# Patient Record
Sex: FEMALE | Race: WHITE | NOT HISPANIC OR LATINO | URBAN - METROPOLITAN AREA
[De-identification: names, ages, dates, MRNs, and addresses within clinical notes are randomized per-mention and may not be internally consistent; named-entity substitution may affect disease eponyms.]

---

## 2021-01-01 ENCOUNTER — INPATIENT (INPATIENT)
Facility: HOSPITAL | Age: 86
LOS: 8 days | Discharge: NURSING, REHAB OR LONG-TERM CARE | End: 2021-04-28
Attending: INTERNAL MEDICINE | Admitting: INTERNAL MEDICINE
Payer: MEDICARE

## 2021-01-01 ENCOUNTER — INPATIENT (INPATIENT)
Facility: HOSPITAL | Age: 86
LOS: 1 days | End: 2021-08-29
Attending: INTERNAL MEDICINE | Admitting: INTERNAL MEDICINE

## 2021-01-01 ENCOUNTER — OUTPATIENT (OUTPATIENT)
Dept: OUTPATIENT SERVICES | Facility: HOSPITAL | Age: 86
LOS: 1 days | End: 2021-01-01
Payer: MEDICARE

## 2021-01-01 ENCOUNTER — INPATIENT (INPATIENT)
Facility: HOSPITAL | Age: 86
LOS: 19 days | Discharge: HOSPICE | End: 2021-08-27
Attending: STUDENT IN AN ORGANIZED HEALTH CARE EDUCATION/TRAINING PROGRAM | Admitting: STUDENT IN AN ORGANIZED HEALTH CARE EDUCATION/TRAINING PROGRAM
Payer: MEDICARE

## 2021-01-01 VITALS
WEIGHT: 128.53 LBS | HEART RATE: 60 BPM | RESPIRATION RATE: 17 BRPM | TEMPERATURE: 99 F | OXYGEN SATURATION: 92 % | DIASTOLIC BLOOD PRESSURE: 69 MMHG | SYSTOLIC BLOOD PRESSURE: 136 MMHG

## 2021-01-01 VITALS
OXYGEN SATURATION: 88 % | HEART RATE: 105 BPM | TEMPERATURE: 98 F | WEIGHT: 160.06 LBS | DIASTOLIC BLOOD PRESSURE: 89 MMHG | HEIGHT: 62 IN | SYSTOLIC BLOOD PRESSURE: 198 MMHG | RESPIRATION RATE: 26 BRPM

## 2021-01-01 VITALS
HEART RATE: 100 BPM | DIASTOLIC BLOOD PRESSURE: 63 MMHG | RESPIRATION RATE: 20 BRPM | SYSTOLIC BLOOD PRESSURE: 123 MMHG | OXYGEN SATURATION: 89 % | TEMPERATURE: 98 F

## 2021-01-01 VITALS
TEMPERATURE: 101 F | OXYGEN SATURATION: 97 % | WEIGHT: 160.06 LBS | HEIGHT: 63 IN | RESPIRATION RATE: 18 BRPM | SYSTOLIC BLOOD PRESSURE: 163 MMHG | HEART RATE: 70 BPM | DIASTOLIC BLOOD PRESSURE: 70 MMHG

## 2021-01-01 VITALS
DIASTOLIC BLOOD PRESSURE: 87 MMHG | SYSTOLIC BLOOD PRESSURE: 141 MMHG | TEMPERATURE: 98 F | HEART RATE: 70 BPM | RESPIRATION RATE: 18 BRPM | OXYGEN SATURATION: 95 %

## 2021-01-01 VITALS
TEMPERATURE: 99 F | RESPIRATION RATE: 18 BRPM | HEART RATE: 95 BPM | SYSTOLIC BLOOD PRESSURE: 143 MMHG | OXYGEN SATURATION: 77 % | DIASTOLIC BLOOD PRESSURE: 78 MMHG

## 2021-01-01 DIAGNOSIS — U07.1 COVID-19: ICD-10-CM

## 2021-01-01 DIAGNOSIS — J96.01 ACUTE RESPIRATORY FAILURE WITH HYPOXIA: ICD-10-CM

## 2021-01-01 DIAGNOSIS — R13.10 DYSPHAGIA, UNSPECIFIED: ICD-10-CM

## 2021-01-01 DIAGNOSIS — Z95.1 PRESENCE OF AORTOCORONARY BYPASS GRAFT: ICD-10-CM

## 2021-01-01 DIAGNOSIS — I25.10 ATHEROSCLEROTIC HEART DISEASE OF NATIVE CORONARY ARTERY WITHOUT ANGINA PECTORIS: ICD-10-CM

## 2021-01-01 DIAGNOSIS — G20 PARKINSON'S DISEASE: ICD-10-CM

## 2021-01-01 DIAGNOSIS — F03.90 UNSPECIFIED DEMENTIA WITHOUT BEHAVIORAL DISTURBANCE: ICD-10-CM

## 2021-01-01 DIAGNOSIS — A41.9 SEPSIS, UNSPECIFIED ORGANISM: ICD-10-CM

## 2021-01-01 DIAGNOSIS — J69.0 PNEUMONITIS DUE TO INHALATION OF FOOD AND VOMIT: ICD-10-CM

## 2021-01-01 DIAGNOSIS — N17.9 ACUTE KIDNEY FAILURE, UNSPECIFIED: ICD-10-CM

## 2021-01-01 DIAGNOSIS — R53.83 OTHER FATIGUE: ICD-10-CM

## 2021-01-01 DIAGNOSIS — E78.5 HYPERLIPIDEMIA, UNSPECIFIED: ICD-10-CM

## 2021-01-01 DIAGNOSIS — Z79.82 LONG TERM (CURRENT) USE OF ASPIRIN: ICD-10-CM

## 2021-01-01 DIAGNOSIS — N10 ACUTE PYELONEPHRITIS: ICD-10-CM

## 2021-01-01 DIAGNOSIS — R50.9 FEVER, UNSPECIFIED: ICD-10-CM

## 2021-01-01 DIAGNOSIS — F02.80 DEMENTIA IN OTHER DISEASES CLASSIFIED ELSEWHERE, UNSPECIFIED SEVERITY, WITHOUT BEHAVIORAL DISTURBANCE, PSYCHOTIC DISTURBANCE, MOOD DISTURBANCE, AND ANXIETY: ICD-10-CM

## 2021-01-01 DIAGNOSIS — F05 DELIRIUM DUE TO KNOWN PHYSIOLOGICAL CONDITION: ICD-10-CM

## 2021-01-01 DIAGNOSIS — J12.82 PNEUMONIA DUE TO CORONAVIRUS DISEASE 2019: ICD-10-CM

## 2021-01-01 DIAGNOSIS — D64.9 ANEMIA, UNSPECIFIED: ICD-10-CM

## 2021-01-01 DIAGNOSIS — G93.49 OTHER ENCEPHALOPATHY: ICD-10-CM

## 2021-01-01 DIAGNOSIS — G93.41 METABOLIC ENCEPHALOPATHY: ICD-10-CM

## 2021-01-01 DIAGNOSIS — R41.0 DISORIENTATION, UNSPECIFIED: ICD-10-CM

## 2021-01-01 DIAGNOSIS — Z51.5 ENCOUNTER FOR PALLIATIVE CARE: ICD-10-CM

## 2021-01-01 DIAGNOSIS — J15.6 PNEUMONIA DUE TO OTHER GRAM-NEGATIVE BACTERIA: ICD-10-CM

## 2021-01-01 DIAGNOSIS — N39.0 URINARY TRACT INFECTION, SITE NOT SPECIFIED: ICD-10-CM

## 2021-01-01 DIAGNOSIS — I10 ESSENTIAL (PRIMARY) HYPERTENSION: ICD-10-CM

## 2021-01-01 DIAGNOSIS — G30.9 ALZHEIMER'S DISEASE, UNSPECIFIED: ICD-10-CM

## 2021-01-01 DIAGNOSIS — E87.6 HYPOKALEMIA: ICD-10-CM

## 2021-01-01 DIAGNOSIS — N18.4 CHRONIC KIDNEY DISEASE, STAGE 4 (SEVERE): ICD-10-CM

## 2021-01-01 DIAGNOSIS — Z95.0 PRESENCE OF CARDIAC PACEMAKER: Chronic | ICD-10-CM

## 2021-01-01 DIAGNOSIS — R06.00 DYSPNEA, UNSPECIFIED: ICD-10-CM

## 2021-01-01 DIAGNOSIS — Z95.0 PRESENCE OF CARDIAC PACEMAKER: ICD-10-CM

## 2021-01-01 DIAGNOSIS — Z88.2 ALLERGY STATUS TO SULFONAMIDES: ICD-10-CM

## 2021-01-01 DIAGNOSIS — E87.0 HYPEROSMOLALITY AND HYPERNATREMIA: ICD-10-CM

## 2021-01-01 DIAGNOSIS — Z66 DO NOT RESUSCITATE: ICD-10-CM

## 2021-01-01 DIAGNOSIS — E88.09 OTHER DISORDERS OF PLASMA-PROTEIN METABOLISM, NOT ELSEWHERE CLASSIFIED: ICD-10-CM

## 2021-01-01 DIAGNOSIS — I12.9 HYPERTENSIVE CHRONIC KIDNEY DISEASE WITH STAGE 1 THROUGH STAGE 4 CHRONIC KIDNEY DISEASE, OR UNSPECIFIED CHRONIC KIDNEY DISEASE: ICD-10-CM

## 2021-01-01 DIAGNOSIS — E87.2 ACIDOSIS: ICD-10-CM

## 2021-01-01 DIAGNOSIS — A41.89 OTHER SPECIFIED SEPSIS: ICD-10-CM

## 2021-01-01 DIAGNOSIS — R53.81 OTHER MALAISE: ICD-10-CM

## 2021-01-01 DIAGNOSIS — R53.2 FUNCTIONAL QUADRIPLEGIA: ICD-10-CM

## 2021-01-01 DIAGNOSIS — R65.20 SEVERE SEPSIS WITHOUT SEPTIC SHOCK: ICD-10-CM

## 2021-01-01 DIAGNOSIS — B96.20 UNSPECIFIED ESCHERICHIA COLI [E. COLI] AS THE CAUSE OF DISEASES CLASSIFIED ELSEWHERE: ICD-10-CM

## 2021-01-01 LAB
-  AMIKACIN: SIGNIFICANT CHANGE UP
-  AMIKACIN: SIGNIFICANT CHANGE UP
-  AMOXICILLIN/CLAVULANIC ACID: SIGNIFICANT CHANGE UP
-  AMOXICILLIN/CLAVULANIC ACID: SIGNIFICANT CHANGE UP
-  AMPICILLIN/SULBACTAM: SIGNIFICANT CHANGE UP
-  AMPICILLIN/SULBACTAM: SIGNIFICANT CHANGE UP
-  AMPICILLIN: SIGNIFICANT CHANGE UP
-  AMPICILLIN: SIGNIFICANT CHANGE UP
-  AZTREONAM: SIGNIFICANT CHANGE UP
-  AZTREONAM: SIGNIFICANT CHANGE UP
-  CEFAZOLIN: SIGNIFICANT CHANGE UP
-  CEFAZOLIN: SIGNIFICANT CHANGE UP
-  CEFEPIME: SIGNIFICANT CHANGE UP
-  CEFEPIME: SIGNIFICANT CHANGE UP
-  CEFOXITIN: SIGNIFICANT CHANGE UP
-  CEFOXITIN: SIGNIFICANT CHANGE UP
-  CEFTRIAXONE: SIGNIFICANT CHANGE UP
-  CEFTRIAXONE: SIGNIFICANT CHANGE UP
-  CIPROFLOXACIN: SIGNIFICANT CHANGE UP
-  CIPROFLOXACIN: SIGNIFICANT CHANGE UP
-  ERTAPENEM: SIGNIFICANT CHANGE UP
-  ERTAPENEM: SIGNIFICANT CHANGE UP
-  GENTAMICIN: SIGNIFICANT CHANGE UP
-  GENTAMICIN: SIGNIFICANT CHANGE UP
-  IMIPENEM: SIGNIFICANT CHANGE UP
-  IMIPENEM: SIGNIFICANT CHANGE UP
-  LEVOFLOXACIN: SIGNIFICANT CHANGE UP
-  LEVOFLOXACIN: SIGNIFICANT CHANGE UP
-  MEROPENEM: SIGNIFICANT CHANGE UP
-  MEROPENEM: SIGNIFICANT CHANGE UP
-  NITROFURANTOIN: SIGNIFICANT CHANGE UP
-  PIPERACILLIN/TAZOBACTAM: SIGNIFICANT CHANGE UP
-  PIPERACILLIN/TAZOBACTAM: SIGNIFICANT CHANGE UP
-  STAPHYLOCOCCUS EPIDERMIDIS, METHICILLIN RESISTANT: SIGNIFICANT CHANGE UP
-  TIGECYCLINE: SIGNIFICANT CHANGE UP
-  TIGECYCLINE: SIGNIFICANT CHANGE UP
-  TOBRAMYCIN: SIGNIFICANT CHANGE UP
-  TOBRAMYCIN: SIGNIFICANT CHANGE UP
-  TRIMETHOPRIM/SULFAMETHOXAZOLE: SIGNIFICANT CHANGE UP
-  TRIMETHOPRIM/SULFAMETHOXAZOLE: SIGNIFICANT CHANGE UP
ALBUMIN SERPL ELPH-MCNC: 1.4 G/DL — LOW (ref 3.3–5)
ALBUMIN SERPL ELPH-MCNC: 1.5 G/DL — LOW (ref 3.3–5)
ALBUMIN SERPL ELPH-MCNC: 1.5 G/DL — LOW (ref 3.3–5)
ALBUMIN SERPL ELPH-MCNC: 1.7 G/DL — LOW (ref 3.3–5)
ALBUMIN SERPL ELPH-MCNC: 1.7 G/DL — LOW (ref 3.3–5)
ALBUMIN SERPL ELPH-MCNC: 1.8 G/DL — LOW (ref 3.3–5)
ALBUMIN SERPL ELPH-MCNC: 2.2 G/DL — LOW (ref 3.3–5)
ALBUMIN SERPL ELPH-MCNC: 2.5 G/DL — LOW (ref 3.3–5)
ALBUMIN SERPL ELPH-MCNC: 2.5 G/DL — LOW (ref 3.3–5)
ALBUMIN SERPL ELPH-MCNC: 2.9 G/DL — LOW (ref 3.3–5)
ALBUMIN SERPL ELPH-MCNC: 3.1 G/DL — LOW (ref 3.3–5)
ALBUMIN SERPL ELPH-MCNC: 3.7 G/DL — SIGNIFICANT CHANGE UP (ref 3.3–5)
ALP SERPL-CCNC: 115 U/L — SIGNIFICANT CHANGE UP (ref 40–120)
ALP SERPL-CCNC: 117 U/L — SIGNIFICANT CHANGE UP (ref 40–120)
ALP SERPL-CCNC: 118 U/L — SIGNIFICANT CHANGE UP (ref 40–120)
ALP SERPL-CCNC: 123 U/L — HIGH (ref 40–120)
ALP SERPL-CCNC: 123 U/L — HIGH (ref 40–120)
ALP SERPL-CCNC: 128 U/L — HIGH (ref 40–120)
ALP SERPL-CCNC: 136 U/L — HIGH (ref 40–120)
ALP SERPL-CCNC: 151 U/L — HIGH (ref 40–120)
ALP SERPL-CCNC: 67 U/L — SIGNIFICANT CHANGE UP (ref 40–120)
ALP SERPL-CCNC: 76 U/L — SIGNIFICANT CHANGE UP (ref 40–120)
ALP SERPL-CCNC: 77 U/L — SIGNIFICANT CHANGE UP (ref 40–120)
ALP SERPL-CCNC: 85 U/L — SIGNIFICANT CHANGE UP (ref 40–120)
ALP SERPL-CCNC: 88 U/L — SIGNIFICANT CHANGE UP (ref 40–120)
ALP SERPL-CCNC: 99 U/L — SIGNIFICANT CHANGE UP (ref 40–120)
ALT FLD-CCNC: 27 U/L — SIGNIFICANT CHANGE UP (ref 12–78)
ALT FLD-CCNC: 29 U/L — SIGNIFICANT CHANGE UP (ref 12–78)
ALT FLD-CCNC: 29 U/L — SIGNIFICANT CHANGE UP (ref 12–78)
ALT FLD-CCNC: 38 U/L — SIGNIFICANT CHANGE UP (ref 12–78)
ALT FLD-CCNC: 39 U/L — SIGNIFICANT CHANGE UP (ref 12–78)
ALT FLD-CCNC: 42 U/L — SIGNIFICANT CHANGE UP (ref 12–78)
ALT FLD-CCNC: 43 U/L — SIGNIFICANT CHANGE UP (ref 12–78)
ALT FLD-CCNC: 43 U/L — SIGNIFICANT CHANGE UP (ref 12–78)
ALT FLD-CCNC: 49 U/L — SIGNIFICANT CHANGE UP (ref 12–78)
ALT FLD-CCNC: 51 U/L — SIGNIFICANT CHANGE UP (ref 12–78)
ALT FLD-CCNC: 54 U/L — SIGNIFICANT CHANGE UP (ref 12–78)
ALT FLD-CCNC: 57 U/L — SIGNIFICANT CHANGE UP (ref 12–78)
ALT FLD-CCNC: 59 U/L — SIGNIFICANT CHANGE UP (ref 12–78)
ALT FLD-CCNC: 71 U/L — SIGNIFICANT CHANGE UP (ref 12–78)
ANION GAP SERPL CALC-SCNC: 10 MMOL/L — SIGNIFICANT CHANGE UP (ref 5–17)
ANION GAP SERPL CALC-SCNC: 11 MMOL/L — SIGNIFICANT CHANGE UP (ref 5–17)
ANION GAP SERPL CALC-SCNC: 13 MMOL/L — SIGNIFICANT CHANGE UP (ref 5–17)
ANION GAP SERPL CALC-SCNC: 3 MMOL/L — LOW (ref 5–17)
ANION GAP SERPL CALC-SCNC: 3 MMOL/L — LOW (ref 5–17)
ANION GAP SERPL CALC-SCNC: 4 MMOL/L — LOW (ref 5–17)
ANION GAP SERPL CALC-SCNC: 5 MMOL/L — SIGNIFICANT CHANGE UP (ref 5–17)
ANION GAP SERPL CALC-SCNC: 6 MMOL/L — SIGNIFICANT CHANGE UP (ref 5–17)
ANION GAP SERPL CALC-SCNC: 7 MMOL/L — SIGNIFICANT CHANGE UP (ref 5–17)
ANION GAP SERPL CALC-SCNC: 8 MMOL/L — SIGNIFICANT CHANGE UP (ref 5–17)
ANION GAP SERPL CALC-SCNC: 9 MMOL/L — SIGNIFICANT CHANGE UP (ref 5–17)
APPEARANCE UR: ABNORMAL
APPEARANCE UR: ABNORMAL
APPEARANCE UR: CLEAR — SIGNIFICANT CHANGE UP
APTT BLD: 26.3 SEC — LOW (ref 27.5–35.5)
APTT BLD: 28.4 SEC — SIGNIFICANT CHANGE UP (ref 27.5–35.5)
APTT BLD: 50.8 SEC — HIGH (ref 27.5–35.5)
AST SERPL-CCNC: 12 U/L — LOW (ref 15–37)
AST SERPL-CCNC: 20 U/L — SIGNIFICANT CHANGE UP (ref 15–37)
AST SERPL-CCNC: 33 U/L — SIGNIFICANT CHANGE UP (ref 15–37)
AST SERPL-CCNC: 34 U/L — SIGNIFICANT CHANGE UP (ref 15–37)
AST SERPL-CCNC: 37 U/L — SIGNIFICANT CHANGE UP (ref 15–37)
AST SERPL-CCNC: 38 U/L — HIGH (ref 15–37)
AST SERPL-CCNC: 39 U/L — HIGH (ref 15–37)
AST SERPL-CCNC: 43 U/L — HIGH (ref 15–37)
AST SERPL-CCNC: 44 U/L — HIGH (ref 15–37)
AST SERPL-CCNC: 48 U/L — HIGH (ref 15–37)
AST SERPL-CCNC: 51 U/L — HIGH (ref 15–37)
AST SERPL-CCNC: 54 U/L — HIGH (ref 15–37)
AST SERPL-CCNC: 69 U/L — HIGH (ref 15–37)
AST SERPL-CCNC: 72 U/L — HIGH (ref 15–37)
BACTERIA # UR AUTO: ABNORMAL
BACTERIA # UR AUTO: ABNORMAL
BASE EXCESS BLDA CALC-SCNC: -0.6 MMOL/L — SIGNIFICANT CHANGE UP (ref -2–2)
BASE EXCESS BLDA CALC-SCNC: -2.1 MMOL/L — LOW (ref -2–2)
BASE EXCESS BLDA CALC-SCNC: -2.8 MMOL/L — LOW (ref -2–2)
BASE EXCESS BLDA CALC-SCNC: -4 MMOL/L — LOW (ref -2–2)
BASOPHILS # BLD AUTO: 0.01 K/UL — SIGNIFICANT CHANGE UP (ref 0–0.2)
BASOPHILS # BLD AUTO: 0.03 K/UL — SIGNIFICANT CHANGE UP (ref 0–0.2)
BASOPHILS # BLD AUTO: 0.03 K/UL — SIGNIFICANT CHANGE UP (ref 0–0.2)
BASOPHILS NFR BLD AUTO: 0.1 % — SIGNIFICANT CHANGE UP (ref 0–2)
BASOPHILS NFR BLD AUTO: 0.3 % — SIGNIFICANT CHANGE UP (ref 0–2)
BASOPHILS NFR BLD AUTO: 0.4 % — SIGNIFICANT CHANGE UP (ref 0–2)
BILIRUB DIRECT SERPL-MCNC: 0.09 MG/DL — SIGNIFICANT CHANGE UP (ref 0.05–0.2)
BILIRUB DIRECT SERPL-MCNC: 0.1 MG/DL — SIGNIFICANT CHANGE UP (ref 0.05–0.2)
BILIRUB DIRECT SERPL-MCNC: 0.12 MG/DL — SIGNIFICANT CHANGE UP (ref 0.05–0.2)
BILIRUB DIRECT SERPL-MCNC: 0.14 MG/DL — SIGNIFICANT CHANGE UP (ref 0.05–0.2)
BILIRUB DIRECT SERPL-MCNC: 0.16 MG/DL — SIGNIFICANT CHANGE UP (ref 0.05–0.2)
BILIRUB DIRECT SERPL-MCNC: 0.18 MG/DL — SIGNIFICANT CHANGE UP (ref 0.05–0.2)
BILIRUB DIRECT SERPL-MCNC: 0.19 MG/DL — SIGNIFICANT CHANGE UP (ref 0.05–0.2)
BILIRUB DIRECT SERPL-MCNC: <.05 MG/DL — SIGNIFICANT CHANGE UP (ref 0.05–0.2)
BILIRUB INDIRECT FLD-MCNC: 0.3 MG/DL — SIGNIFICANT CHANGE UP (ref 0.2–1)
BILIRUB INDIRECT FLD-MCNC: 0.4 MG/DL — SIGNIFICANT CHANGE UP (ref 0.2–1)
BILIRUB INDIRECT FLD-MCNC: 0.4 MG/DL — SIGNIFICANT CHANGE UP (ref 0.2–1)
BILIRUB INDIRECT FLD-MCNC: 0.5 MG/DL — SIGNIFICANT CHANGE UP (ref 0.2–1)
BILIRUB INDIRECT FLD-MCNC: 0.6 MG/DL — SIGNIFICANT CHANGE UP (ref 0.2–1)
BILIRUB INDIRECT FLD-MCNC: >0.4 MG/DL — SIGNIFICANT CHANGE UP (ref 0.2–1)
BILIRUB SERPL-MCNC: 0.3 MG/DL — SIGNIFICANT CHANGE UP (ref 0.2–1.2)
BILIRUB SERPL-MCNC: 0.4 MG/DL — SIGNIFICANT CHANGE UP (ref 0.2–1.2)
BILIRUB SERPL-MCNC: 0.5 MG/DL — SIGNIFICANT CHANGE UP (ref 0.2–1.2)
BILIRUB SERPL-MCNC: 0.6 MG/DL — SIGNIFICANT CHANGE UP (ref 0.2–1.2)
BILIRUB SERPL-MCNC: 0.6 MG/DL — SIGNIFICANT CHANGE UP (ref 0.2–1.2)
BILIRUB SERPL-MCNC: 0.7 MG/DL — SIGNIFICANT CHANGE UP (ref 0.2–1.2)
BILIRUB SERPL-MCNC: 1.3 MG/DL — HIGH (ref 0.2–1.2)
BILIRUB UR-MCNC: NEGATIVE — SIGNIFICANT CHANGE UP
BLOOD GAS COMMENTS: SIGNIFICANT CHANGE UP
BLOOD GAS SOURCE: SIGNIFICANT CHANGE UP
BUN SERPL-MCNC: 26 MG/DL — HIGH (ref 7–23)
BUN SERPL-MCNC: 29 MG/DL — HIGH (ref 7–23)
BUN SERPL-MCNC: 30 MG/DL — HIGH (ref 7–23)
BUN SERPL-MCNC: 30 MG/DL — HIGH (ref 7–23)
BUN SERPL-MCNC: 32 MG/DL — HIGH (ref 7–23)
BUN SERPL-MCNC: 32 MG/DL — HIGH (ref 7–23)
BUN SERPL-MCNC: 33 MG/DL — HIGH (ref 7–23)
BUN SERPL-MCNC: 34 MG/DL — HIGH (ref 7–23)
BUN SERPL-MCNC: 35 MG/DL — HIGH (ref 7–23)
BUN SERPL-MCNC: 36 MG/DL — HIGH (ref 7–23)
BUN SERPL-MCNC: 37 MG/DL — HIGH (ref 7–23)
BUN SERPL-MCNC: 38 MG/DL — HIGH (ref 7–23)
BUN SERPL-MCNC: 40 MG/DL — HIGH (ref 7–23)
BUN SERPL-MCNC: 40 MG/DL — HIGH (ref 7–23)
BUN SERPL-MCNC: 41 MG/DL — HIGH (ref 7–23)
BUN SERPL-MCNC: 42 MG/DL — HIGH (ref 7–23)
BUN SERPL-MCNC: 44 MG/DL — HIGH (ref 7–23)
BUN SERPL-MCNC: 45 MG/DL — HIGH (ref 7–23)
BUN SERPL-MCNC: 46 MG/DL — HIGH (ref 7–23)
BUN SERPL-MCNC: 49 MG/DL — HIGH (ref 7–23)
BUN SERPL-MCNC: 52 MG/DL — HIGH (ref 7–23)
BUN SERPL-MCNC: 52 MG/DL — HIGH (ref 7–23)
BUN SERPL-MCNC: 57 MG/DL — HIGH (ref 7–23)
BUN SERPL-MCNC: 65 MG/DL — HIGH (ref 7–23)
CALCIUM SERPL-MCNC: 7.4 MG/DL — LOW (ref 8.5–10.1)
CALCIUM SERPL-MCNC: 7.8 MG/DL — LOW (ref 8.5–10.1)
CALCIUM SERPL-MCNC: 7.8 MG/DL — LOW (ref 8.5–10.1)
CALCIUM SERPL-MCNC: 7.9 MG/DL — LOW (ref 8.5–10.1)
CALCIUM SERPL-MCNC: 8 MG/DL — LOW (ref 8.5–10.1)
CALCIUM SERPL-MCNC: 8 MG/DL — LOW (ref 8.5–10.1)
CALCIUM SERPL-MCNC: 8.1 MG/DL — LOW (ref 8.5–10.1)
CALCIUM SERPL-MCNC: 8.2 MG/DL — LOW (ref 8.5–10.1)
CALCIUM SERPL-MCNC: 8.2 MG/DL — LOW (ref 8.5–10.1)
CALCIUM SERPL-MCNC: 8.3 MG/DL — LOW (ref 8.5–10.1)
CALCIUM SERPL-MCNC: 8.3 MG/DL — LOW (ref 8.5–10.1)
CALCIUM SERPL-MCNC: 8.4 MG/DL — LOW (ref 8.5–10.1)
CALCIUM SERPL-MCNC: 8.5 MG/DL — SIGNIFICANT CHANGE UP (ref 8.5–10.1)
CALCIUM SERPL-MCNC: 8.7 MG/DL — SIGNIFICANT CHANGE UP (ref 8.5–10.1)
CALCIUM SERPL-MCNC: 8.8 MG/DL — SIGNIFICANT CHANGE UP (ref 8.5–10.1)
CALCIUM SERPL-MCNC: 8.8 MG/DL — SIGNIFICANT CHANGE UP (ref 8.5–10.1)
CALCIUM SERPL-MCNC: 8.9 MG/DL — SIGNIFICANT CHANGE UP (ref 8.5–10.1)
CALCIUM SERPL-MCNC: 9 MG/DL — SIGNIFICANT CHANGE UP (ref 8.5–10.1)
CALCIUM SERPL-MCNC: 9.2 MG/DL — SIGNIFICANT CHANGE UP (ref 8.5–10.1)
CALCIUM SERPL-MCNC: 9.2 MG/DL — SIGNIFICANT CHANGE UP (ref 8.5–10.1)
CHLORIDE SERPL-SCNC: 104 MMOL/L — SIGNIFICANT CHANGE UP (ref 96–108)
CHLORIDE SERPL-SCNC: 108 MMOL/L — SIGNIFICANT CHANGE UP (ref 96–108)
CHLORIDE SERPL-SCNC: 108 MMOL/L — SIGNIFICANT CHANGE UP (ref 96–108)
CHLORIDE SERPL-SCNC: 109 MMOL/L — HIGH (ref 96–108)
CHLORIDE SERPL-SCNC: 109 MMOL/L — HIGH (ref 96–108)
CHLORIDE SERPL-SCNC: 110 MMOL/L — HIGH (ref 96–108)
CHLORIDE SERPL-SCNC: 111 MMOL/L — HIGH (ref 96–108)
CHLORIDE SERPL-SCNC: 112 MMOL/L — HIGH (ref 96–108)
CHLORIDE SERPL-SCNC: 113 MMOL/L — HIGH (ref 96–108)
CHLORIDE SERPL-SCNC: 115 MMOL/L — HIGH (ref 96–108)
CHLORIDE SERPL-SCNC: 117 MMOL/L — HIGH (ref 96–108)
CHLORIDE SERPL-SCNC: 118 MMOL/L — HIGH (ref 96–108)
CHLORIDE SERPL-SCNC: 118 MMOL/L — HIGH (ref 96–108)
CHLORIDE SERPL-SCNC: 119 MMOL/L — HIGH (ref 96–108)
CHLORIDE SERPL-SCNC: 120 MMOL/L — HIGH (ref 96–108)
CHLORIDE SERPL-SCNC: 121 MMOL/L — HIGH (ref 96–108)
CHLORIDE SERPL-SCNC: 121 MMOL/L — HIGH (ref 96–108)
CHLORIDE SERPL-SCNC: 123 MMOL/L — HIGH (ref 96–108)
CHLORIDE SERPL-SCNC: 126 MMOL/L — HIGH (ref 96–108)
CHLORIDE SERPL-SCNC: 89 MMOL/L — LOW (ref 96–108)
CO2 SERPL-SCNC: 21 MMOL/L — LOW (ref 22–31)
CO2 SERPL-SCNC: 21 MMOL/L — LOW (ref 22–31)
CO2 SERPL-SCNC: 22 MMOL/L — SIGNIFICANT CHANGE UP (ref 22–31)
CO2 SERPL-SCNC: 23 MMOL/L — SIGNIFICANT CHANGE UP (ref 22–31)
CO2 SERPL-SCNC: 24 MMOL/L — SIGNIFICANT CHANGE UP (ref 22–31)
CO2 SERPL-SCNC: 25 MMOL/L — SIGNIFICANT CHANGE UP (ref 22–31)
CO2 SERPL-SCNC: 26 MMOL/L — SIGNIFICANT CHANGE UP (ref 22–31)
CO2 SERPL-SCNC: 27 MMOL/L — SIGNIFICANT CHANGE UP (ref 22–31)
CO2 SERPL-SCNC: 28 MMOL/L — SIGNIFICANT CHANGE UP (ref 22–31)
CO2 SERPL-SCNC: 28 MMOL/L — SIGNIFICANT CHANGE UP (ref 22–31)
CO2 SERPL-SCNC: 29 MMOL/L — SIGNIFICANT CHANGE UP (ref 22–31)
CO2 SERPL-SCNC: 29 MMOL/L — SIGNIFICANT CHANGE UP (ref 22–31)
CO2 SERPL-SCNC: 30 MMOL/L — SIGNIFICANT CHANGE UP (ref 22–31)
CO2 SERPL-SCNC: 31 MMOL/L — SIGNIFICANT CHANGE UP (ref 22–31)
CO2 SERPL-SCNC: 31 MMOL/L — SIGNIFICANT CHANGE UP (ref 22–31)
CO2 SERPL-SCNC: 32 MMOL/L — HIGH (ref 22–31)
CO2 SERPL-SCNC: 33 MMOL/L — HIGH (ref 22–31)
COLOR SPEC: YELLOW — SIGNIFICANT CHANGE UP
COVID-19 SPIKE DOMAIN AB INTERP: POSITIVE
COVID-19 SPIKE DOMAIN ANTIBODY RESULT: 12.5 U/ML — HIGH
COVID-19 SPIKE DOMAIN ANTIBODY RESULT: 12.8 U/ML — HIGH
COVID-19 SPIKE DOMAIN ANTIBODY RESULT: >250 U/ML — HIGH
CREAT SERPL-MCNC: 1.06 MG/DL — SIGNIFICANT CHANGE UP (ref 0.5–1.3)
CREAT SERPL-MCNC: 1.08 MG/DL — SIGNIFICANT CHANGE UP (ref 0.5–1.3)
CREAT SERPL-MCNC: 1.15 MG/DL — SIGNIFICANT CHANGE UP (ref 0.5–1.3)
CREAT SERPL-MCNC: 1.17 MG/DL — SIGNIFICANT CHANGE UP (ref 0.5–1.3)
CREAT SERPL-MCNC: 1.2 MG/DL — SIGNIFICANT CHANGE UP (ref 0.5–1.3)
CREAT SERPL-MCNC: 1.32 MG/DL — HIGH (ref 0.5–1.3)
CREAT SERPL-MCNC: 1.34 MG/DL — HIGH (ref 0.5–1.3)
CREAT SERPL-MCNC: 1.38 MG/DL — HIGH (ref 0.5–1.3)
CREAT SERPL-MCNC: 1.39 MG/DL — HIGH (ref 0.5–1.3)
CREAT SERPL-MCNC: 1.44 MG/DL — HIGH (ref 0.5–1.3)
CREAT SERPL-MCNC: 1.44 MG/DL — HIGH (ref 0.5–1.3)
CREAT SERPL-MCNC: 1.46 MG/DL — HIGH (ref 0.5–1.3)
CREAT SERPL-MCNC: 1.5 MG/DL — HIGH (ref 0.5–1.3)
CREAT SERPL-MCNC: 1.51 MG/DL — HIGH (ref 0.5–1.3)
CREAT SERPL-MCNC: 1.54 MG/DL — HIGH (ref 0.5–1.3)
CREAT SERPL-MCNC: 1.55 MG/DL — HIGH (ref 0.5–1.3)
CREAT SERPL-MCNC: 1.56 MG/DL — HIGH (ref 0.5–1.3)
CREAT SERPL-MCNC: 1.62 MG/DL — HIGH (ref 0.5–1.3)
CREAT SERPL-MCNC: 1.63 MG/DL — HIGH (ref 0.5–1.3)
CREAT SERPL-MCNC: 1.69 MG/DL — HIGH (ref 0.5–1.3)
CREAT SERPL-MCNC: 1.71 MG/DL — HIGH (ref 0.5–1.3)
CREAT SERPL-MCNC: 1.75 MG/DL — HIGH (ref 0.5–1.3)
CREAT SERPL-MCNC: 1.82 MG/DL — HIGH (ref 0.5–1.3)
CREAT SERPL-MCNC: 1.83 MG/DL — HIGH (ref 0.5–1.3)
CREAT SERPL-MCNC: 1.94 MG/DL — HIGH (ref 0.5–1.3)
CREAT SERPL-MCNC: 1.99 MG/DL — HIGH (ref 0.5–1.3)
CREAT SERPL-MCNC: 2 MG/DL — HIGH (ref 0.5–1.3)
CREAT SERPL-MCNC: 2.12 MG/DL — HIGH (ref 0.5–1.3)
CREAT SERPL-MCNC: 2.25 MG/DL — HIGH (ref 0.5–1.3)
CREAT SERPL-MCNC: 2.51 MG/DL — HIGH (ref 0.5–1.3)
CRP SERPL-MCNC: 122 MG/L — HIGH
CRP SERPL-MCNC: 7 MG/L — HIGH
CULTURE RESULTS: SIGNIFICANT CHANGE UP
D DIMER BLD IA.RAPID-MCNC: 381 NG/ML DDU — HIGH
D DIMER BLD IA.RAPID-MCNC: 431 NG/ML DDU — HIGH
D DIMER BLD IA.RAPID-MCNC: 477 NG/ML DDU — HIGH
DIFF PNL FLD: ABNORMAL
EOSINOPHIL # BLD AUTO: 0 K/UL — SIGNIFICANT CHANGE UP (ref 0–0.5)
EOSINOPHIL # BLD AUTO: 0.03 K/UL — SIGNIFICANT CHANGE UP (ref 0–0.5)
EOSINOPHIL # BLD AUTO: 0.07 K/UL — SIGNIFICANT CHANGE UP (ref 0–0.5)
EOSINOPHIL NFR BLD AUTO: 0 % — SIGNIFICANT CHANGE UP (ref 0–6)
EOSINOPHIL NFR BLD AUTO: 0.3 % — SIGNIFICANT CHANGE UP (ref 0–6)
EOSINOPHIL NFR BLD AUTO: 0.8 % — SIGNIFICANT CHANGE UP (ref 0–6)
EPI CELLS # UR: SIGNIFICANT CHANGE UP
EPI CELLS # UR: SIGNIFICANT CHANGE UP
FERRITIN SERPL-MCNC: 252 NG/ML — HIGH (ref 15–150)
FERRITIN SERPL-MCNC: 65 NG/ML — SIGNIFICANT CHANGE UP (ref 15–150)
FLUAV AG NPH QL: SIGNIFICANT CHANGE UP
FLUBV AG NPH QL: SIGNIFICANT CHANGE UP
GLUCOSE BLDC GLUCOMTR-MCNC: 132 MG/DL — HIGH (ref 70–99)
GLUCOSE BLDC GLUCOMTR-MCNC: 215 MG/DL — HIGH (ref 70–99)
GLUCOSE SERPL-MCNC: 103 MG/DL — HIGH (ref 70–99)
GLUCOSE SERPL-MCNC: 107 MG/DL — HIGH (ref 70–99)
GLUCOSE SERPL-MCNC: 110 MG/DL — HIGH (ref 70–99)
GLUCOSE SERPL-MCNC: 113 MG/DL — HIGH (ref 70–99)
GLUCOSE SERPL-MCNC: 116 MG/DL — HIGH (ref 70–99)
GLUCOSE SERPL-MCNC: 116 MG/DL — HIGH (ref 70–99)
GLUCOSE SERPL-MCNC: 124 MG/DL — HIGH (ref 70–99)
GLUCOSE SERPL-MCNC: 128 MG/DL — HIGH (ref 70–99)
GLUCOSE SERPL-MCNC: 130 MG/DL — HIGH (ref 70–99)
GLUCOSE SERPL-MCNC: 138 MG/DL — HIGH (ref 70–99)
GLUCOSE SERPL-MCNC: 140 MG/DL — HIGH (ref 70–99)
GLUCOSE SERPL-MCNC: 171 MG/DL — HIGH (ref 70–99)
GLUCOSE SERPL-MCNC: 184 MG/DL — HIGH (ref 70–99)
GLUCOSE SERPL-MCNC: 191 MG/DL — HIGH (ref 70–99)
GLUCOSE SERPL-MCNC: 232 MG/DL — HIGH (ref 70–99)
GLUCOSE SERPL-MCNC: 258 MG/DL — HIGH (ref 70–99)
GLUCOSE SERPL-MCNC: 282 MG/DL — HIGH (ref 70–99)
GLUCOSE SERPL-MCNC: 713 MG/DL — CRITICAL HIGH (ref 70–99)
GLUCOSE SERPL-MCNC: 81 MG/DL — SIGNIFICANT CHANGE UP (ref 70–99)
GLUCOSE SERPL-MCNC: 82 MG/DL — SIGNIFICANT CHANGE UP (ref 70–99)
GLUCOSE SERPL-MCNC: 85 MG/DL — SIGNIFICANT CHANGE UP (ref 70–99)
GLUCOSE SERPL-MCNC: 94 MG/DL — SIGNIFICANT CHANGE UP (ref 70–99)
GLUCOSE SERPL-MCNC: 95 MG/DL — SIGNIFICANT CHANGE UP (ref 70–99)
GLUCOSE SERPL-MCNC: 98 MG/DL — SIGNIFICANT CHANGE UP (ref 70–99)
GLUCOSE SERPL-MCNC: 99 MG/DL — SIGNIFICANT CHANGE UP (ref 70–99)
GLUCOSE UR QL: 100 MG/DL
GLUCOSE UR QL: NEGATIVE MG/DL — SIGNIFICANT CHANGE UP
GLUCOSE UR QL: NEGATIVE MG/DL — SIGNIFICANT CHANGE UP
GRAM STN FLD: SIGNIFICANT CHANGE UP
HCO3 BLDA-SCNC: 20 MMOL/L — LOW (ref 21–29)
HCO3 BLDA-SCNC: 21 MMOL/L — SIGNIFICANT CHANGE UP (ref 21–29)
HCO3 BLDA-SCNC: 22 MMOL/L — SIGNIFICANT CHANGE UP (ref 21–29)
HCO3 BLDA-SCNC: 24 MMOL/L — SIGNIFICANT CHANGE UP (ref 21–29)
HCT VFR BLD CALC: 27.1 % — LOW (ref 34.5–45)
HCT VFR BLD CALC: 27.1 % — LOW (ref 34.5–45)
HCT VFR BLD CALC: 28.6 % — LOW (ref 34.5–45)
HCT VFR BLD CALC: 29.5 % — LOW (ref 34.5–45)
HCT VFR BLD CALC: 30 % — LOW (ref 34.5–45)
HCT VFR BLD CALC: 31.5 % — LOW (ref 34.5–45)
HCT VFR BLD CALC: 31.6 % — LOW (ref 34.5–45)
HCT VFR BLD CALC: 31.6 % — LOW (ref 34.5–45)
HCT VFR BLD CALC: 31.9 % — LOW (ref 34.5–45)
HCT VFR BLD CALC: 32.7 % — LOW (ref 34.5–45)
HCT VFR BLD CALC: 32.9 % — LOW (ref 34.5–45)
HCT VFR BLD CALC: 33.4 % — LOW (ref 34.5–45)
HCT VFR BLD CALC: 33.5 % — LOW (ref 34.5–45)
HCT VFR BLD CALC: 33.6 % — LOW (ref 34.5–45)
HCT VFR BLD CALC: 33.7 % — LOW (ref 34.5–45)
HCT VFR BLD CALC: 34.4 % — LOW (ref 34.5–45)
HCT VFR BLD CALC: 34.6 % — SIGNIFICANT CHANGE UP (ref 34.5–45)
HCT VFR BLD CALC: 36.2 % — SIGNIFICANT CHANGE UP (ref 34.5–45)
HCT VFR BLD CALC: 37.3 % — SIGNIFICANT CHANGE UP (ref 34.5–45)
HCT VFR BLD CALC: 39.1 % — SIGNIFICANT CHANGE UP (ref 34.5–45)
HGB BLD-MCNC: 10 G/DL — LOW (ref 11.5–15.5)
HGB BLD-MCNC: 10.2 G/DL — LOW (ref 11.5–15.5)
HGB BLD-MCNC: 10.4 G/DL — LOW (ref 11.5–15.5)
HGB BLD-MCNC: 10.6 G/DL — LOW (ref 11.5–15.5)
HGB BLD-MCNC: 10.6 G/DL — LOW (ref 11.5–15.5)
HGB BLD-MCNC: 10.7 G/DL — LOW (ref 11.5–15.5)
HGB BLD-MCNC: 11.4 G/DL — LOW (ref 11.5–15.5)
HGB BLD-MCNC: 11.7 G/DL — SIGNIFICANT CHANGE UP (ref 11.5–15.5)
HGB BLD-MCNC: 12.4 G/DL — SIGNIFICANT CHANGE UP (ref 11.5–15.5)
HGB BLD-MCNC: 8.7 G/DL — LOW (ref 11.5–15.5)
HGB BLD-MCNC: 8.7 G/DL — LOW (ref 11.5–15.5)
HGB BLD-MCNC: 8.9 G/DL — LOW (ref 11.5–15.5)
HGB BLD-MCNC: 9.6 G/DL — LOW (ref 11.5–15.5)
HGB BLD-MCNC: 9.7 G/DL — LOW (ref 11.5–15.5)
HGB BLD-MCNC: 9.9 G/DL — LOW (ref 11.5–15.5)
HGB BLD-MCNC: 9.9 G/DL — LOW (ref 11.5–15.5)
HOROWITZ INDEX BLDA+IHG-RTO: 100 — SIGNIFICANT CHANGE UP
HOROWITZ INDEX BLDA+IHG-RTO: 40 — SIGNIFICANT CHANGE UP
HOROWITZ INDEX BLDA+IHG-RTO: 40 — SIGNIFICANT CHANGE UP
HOROWITZ INDEX BLDA+IHG-RTO: 60 — SIGNIFICANT CHANGE UP
IMM GRANULOCYTES NFR BLD AUTO: 0.4 % — SIGNIFICANT CHANGE UP (ref 0–1.5)
IMM GRANULOCYTES NFR BLD AUTO: 0.5 % — SIGNIFICANT CHANGE UP (ref 0–1.5)
IMM GRANULOCYTES NFR BLD AUTO: 1.3 % — SIGNIFICANT CHANGE UP (ref 0–1.5)
INR BLD: 1.09 RATIO — SIGNIFICANT CHANGE UP (ref 0.88–1.16)
INR BLD: 1.09 RATIO — SIGNIFICANT CHANGE UP (ref 0.88–1.16)
INR BLD: 1.15 RATIO — SIGNIFICANT CHANGE UP (ref 0.88–1.16)
INR BLD: 1.17 RATIO — HIGH (ref 0.88–1.16)
INR BLD: 1.25 RATIO — HIGH (ref 0.88–1.16)
INR BLD: 1.26 RATIO — HIGH (ref 0.88–1.16)
INR BLD: 1.27 RATIO — HIGH (ref 0.88–1.16)
INR BLD: 1.29 RATIO — HIGH (ref 0.88–1.16)
INR BLD: 1.29 RATIO — HIGH (ref 0.88–1.16)
INR BLD: 1.33 RATIO — HIGH (ref 0.88–1.16)
KETONES UR-MCNC: NEGATIVE — SIGNIFICANT CHANGE UP
LACTATE SERPL-SCNC: 1.6 MMOL/L — SIGNIFICANT CHANGE UP (ref 0.7–2)
LACTATE SERPL-SCNC: 1.6 MMOL/L — SIGNIFICANT CHANGE UP (ref 0.7–2)
LACTATE SERPL-SCNC: 1.7 MMOL/L — SIGNIFICANT CHANGE UP (ref 0.7–2)
LACTATE SERPL-SCNC: 1.7 MMOL/L — SIGNIFICANT CHANGE UP (ref 0.7–2)
LACTATE SERPL-SCNC: 2.3 MMOL/L — HIGH (ref 0.7–2)
LACTATE SERPL-SCNC: 2.9 MMOL/L — HIGH (ref 0.7–2)
LACTATE SERPL-SCNC: 4.6 MMOL/L — CRITICAL HIGH (ref 0.7–2)
LEUKOCYTE ESTERASE UR-ACNC: ABNORMAL
LYMPHOCYTES # BLD AUTO: 0.58 K/UL — LOW (ref 1–3.3)
LYMPHOCYTES # BLD AUTO: 0.61 K/UL — LOW (ref 1–3.3)
LYMPHOCYTES # BLD AUTO: 0.69 K/UL — LOW (ref 1–3.3)
LYMPHOCYTES # BLD AUTO: 6.7 % — LOW (ref 13–44)
LYMPHOCYTES # BLD AUTO: 6.9 % — LOW (ref 13–44)
LYMPHOCYTES # BLD AUTO: 7.2 % — LOW (ref 13–44)
MAGNESIUM SERPL-MCNC: 2 MG/DL — SIGNIFICANT CHANGE UP (ref 1.6–2.6)
MAGNESIUM SERPL-MCNC: 2.1 MG/DL — SIGNIFICANT CHANGE UP (ref 1.6–2.6)
MAGNESIUM SERPL-MCNC: 2.2 MG/DL — SIGNIFICANT CHANGE UP (ref 1.6–2.6)
MAGNESIUM SERPL-MCNC: 2.3 MG/DL — SIGNIFICANT CHANGE UP (ref 1.6–2.6)
MAGNESIUM SERPL-MCNC: 2.3 MG/DL — SIGNIFICANT CHANGE UP (ref 1.6–2.6)
MAGNESIUM SERPL-MCNC: 2.4 MG/DL — SIGNIFICANT CHANGE UP (ref 1.6–2.6)
MAGNESIUM SERPL-MCNC: 2.5 MG/DL — SIGNIFICANT CHANGE UP (ref 1.6–2.6)
MAGNESIUM SERPL-MCNC: 2.6 MG/DL — SIGNIFICANT CHANGE UP (ref 1.6–2.6)
MCHC RBC-ENTMCNC: 27.9 PG — SIGNIFICANT CHANGE UP (ref 27–34)
MCHC RBC-ENTMCNC: 28 PG — SIGNIFICANT CHANGE UP (ref 27–34)
MCHC RBC-ENTMCNC: 28.3 PG — SIGNIFICANT CHANGE UP (ref 27–34)
MCHC RBC-ENTMCNC: 28.4 PG — SIGNIFICANT CHANGE UP (ref 27–34)
MCHC RBC-ENTMCNC: 28.7 PG — SIGNIFICANT CHANGE UP (ref 27–34)
MCHC RBC-ENTMCNC: 28.7 PG — SIGNIFICANT CHANGE UP (ref 27–34)
MCHC RBC-ENTMCNC: 28.9 PG — SIGNIFICANT CHANGE UP (ref 27–34)
MCHC RBC-ENTMCNC: 29 PG — SIGNIFICANT CHANGE UP (ref 27–34)
MCHC RBC-ENTMCNC: 29.1 PG — SIGNIFICANT CHANGE UP (ref 27–34)
MCHC RBC-ENTMCNC: 29.2 PG — SIGNIFICANT CHANGE UP (ref 27–34)
MCHC RBC-ENTMCNC: 29.6 PG — SIGNIFICANT CHANGE UP (ref 27–34)
MCHC RBC-ENTMCNC: 29.6 PG — SIGNIFICANT CHANGE UP (ref 27–34)
MCHC RBC-ENTMCNC: 29.8 PG — SIGNIFICANT CHANGE UP (ref 27–34)
MCHC RBC-ENTMCNC: 29.9 PG — SIGNIFICANT CHANGE UP (ref 27–34)
MCHC RBC-ENTMCNC: 30 PG — SIGNIFICANT CHANGE UP (ref 27–34)
MCHC RBC-ENTMCNC: 30.1 PG — SIGNIFICANT CHANGE UP (ref 27–34)
MCHC RBC-ENTMCNC: 30.2 PG — SIGNIFICANT CHANGE UP (ref 27–34)
MCHC RBC-ENTMCNC: 30.2 PG — SIGNIFICANT CHANGE UP (ref 27–34)
MCHC RBC-ENTMCNC: 30.3 PG — SIGNIFICANT CHANGE UP (ref 27–34)
MCHC RBC-ENTMCNC: 30.7 PG — SIGNIFICANT CHANGE UP (ref 27–34)
MCHC RBC-ENTMCNC: 30.9 GM/DL — LOW (ref 32–36)
MCHC RBC-ENTMCNC: 31 GM/DL — LOW (ref 32–36)
MCHC RBC-ENTMCNC: 31.1 GM/DL — LOW (ref 32–36)
MCHC RBC-ENTMCNC: 31.1 GM/DL — LOW (ref 32–36)
MCHC RBC-ENTMCNC: 31.2 GM/DL — LOW (ref 32–36)
MCHC RBC-ENTMCNC: 31.3 GM/DL — LOW (ref 32–36)
MCHC RBC-ENTMCNC: 31.3 GM/DL — LOW (ref 32–36)
MCHC RBC-ENTMCNC: 31.4 GM/DL — LOW (ref 32–36)
MCHC RBC-ENTMCNC: 31.5 GM/DL — LOW (ref 32–36)
MCHC RBC-ENTMCNC: 31.5 GM/DL — LOW (ref 32–36)
MCHC RBC-ENTMCNC: 31.6 GM/DL — LOW (ref 32–36)
MCHC RBC-ENTMCNC: 31.7 GM/DL — LOW (ref 32–36)
MCHC RBC-ENTMCNC: 31.9 GM/DL — LOW (ref 32–36)
MCHC RBC-ENTMCNC: 32 GM/DL — SIGNIFICANT CHANGE UP (ref 32–36)
MCHC RBC-ENTMCNC: 32.1 GM/DL — SIGNIFICANT CHANGE UP (ref 32–36)
MCHC RBC-ENTMCNC: 32.1 GM/DL — SIGNIFICANT CHANGE UP (ref 32–36)
MCHC RBC-ENTMCNC: 32.6 GM/DL — SIGNIFICANT CHANGE UP (ref 32–36)
MCHC RBC-ENTMCNC: 32.9 GM/DL — SIGNIFICANT CHANGE UP (ref 32–36)
MCV RBC AUTO: 88.1 FL — SIGNIFICANT CHANGE UP (ref 80–100)
MCV RBC AUTO: 89 FL — SIGNIFICANT CHANGE UP (ref 80–100)
MCV RBC AUTO: 90.6 FL — SIGNIFICANT CHANGE UP (ref 80–100)
MCV RBC AUTO: 90.6 FL — SIGNIFICANT CHANGE UP (ref 80–100)
MCV RBC AUTO: 90.8 FL — SIGNIFICANT CHANGE UP (ref 80–100)
MCV RBC AUTO: 90.9 FL — SIGNIFICANT CHANGE UP (ref 80–100)
MCV RBC AUTO: 90.9 FL — SIGNIFICANT CHANGE UP (ref 80–100)
MCV RBC AUTO: 91 FL — SIGNIFICANT CHANGE UP (ref 80–100)
MCV RBC AUTO: 91.4 FL — SIGNIFICANT CHANGE UP (ref 80–100)
MCV RBC AUTO: 91.6 FL — SIGNIFICANT CHANGE UP (ref 80–100)
MCV RBC AUTO: 92.2 FL — SIGNIFICANT CHANGE UP (ref 80–100)
MCV RBC AUTO: 92.7 FL — SIGNIFICANT CHANGE UP (ref 80–100)
MCV RBC AUTO: 94.5 FL — SIGNIFICANT CHANGE UP (ref 80–100)
MCV RBC AUTO: 94.6 FL — SIGNIFICANT CHANGE UP (ref 80–100)
MCV RBC AUTO: 94.9 FL — SIGNIFICANT CHANGE UP (ref 80–100)
MCV RBC AUTO: 94.9 FL — SIGNIFICANT CHANGE UP (ref 80–100)
MCV RBC AUTO: 95.6 FL — SIGNIFICANT CHANGE UP (ref 80–100)
MCV RBC AUTO: 95.7 FL — SIGNIFICANT CHANGE UP (ref 80–100)
MCV RBC AUTO: 96.4 FL — SIGNIFICANT CHANGE UP (ref 80–100)
MCV RBC AUTO: 97.9 FL — SIGNIFICANT CHANGE UP (ref 80–100)
METHOD TYPE: SIGNIFICANT CHANGE UP
MONOCYTES # BLD AUTO: 0.18 K/UL — SIGNIFICANT CHANGE UP (ref 0–0.9)
MONOCYTES # BLD AUTO: 0.53 K/UL — SIGNIFICANT CHANGE UP (ref 0–0.9)
MONOCYTES # BLD AUTO: 0.88 K/UL — SIGNIFICANT CHANGE UP (ref 0–0.9)
MONOCYTES NFR BLD AUTO: 10.4 % — SIGNIFICANT CHANGE UP (ref 2–14)
MONOCYTES NFR BLD AUTO: 2.1 % — SIGNIFICANT CHANGE UP (ref 2–14)
MONOCYTES NFR BLD AUTO: 5.3 % — SIGNIFICANT CHANGE UP (ref 2–14)
NEUTROPHILS # BLD AUTO: 6.82 K/UL — SIGNIFICANT CHANGE UP (ref 1.8–7.4)
NEUTROPHILS # BLD AUTO: 7.83 K/UL — HIGH (ref 1.8–7.4)
NEUTROPHILS # BLD AUTO: 8.69 K/UL — HIGH (ref 1.8–7.4)
NEUTROPHILS NFR BLD AUTO: 80.8 % — HIGH (ref 43–77)
NEUTROPHILS NFR BLD AUTO: 86.7 % — HIGH (ref 43–77)
NEUTROPHILS NFR BLD AUTO: 89.8 % — HIGH (ref 43–77)
NITRITE UR-MCNC: NEGATIVE — SIGNIFICANT CHANGE UP
NITRITE UR-MCNC: NEGATIVE — SIGNIFICANT CHANGE UP
NITRITE UR-MCNC: POSITIVE
NRBC # BLD: 0 /100 WBCS — SIGNIFICANT CHANGE UP (ref 0–0)
NT-PROBNP SERPL-SCNC: 5414 PG/ML — HIGH (ref 0–450)
NT-PROBNP SERPL-SCNC: 625 PG/ML — HIGH (ref 0–450)
ORGANISM # SPEC MICROSCOPIC CNT: SIGNIFICANT CHANGE UP
PCO2 BLDA: 25 MMHG — LOW (ref 32–46)
PCO2 BLDA: 29 MMHG — LOW (ref 32–46)
PCO2 BLDA: 49 MMHG — HIGH (ref 32–46)
PCO2 BLDA: 51 MMHG — HIGH (ref 32–46)
PH BLD: 7.28 — LOW (ref 7.35–7.45)
PH BLD: 7.29 — LOW (ref 7.35–7.45)
PH BLD: 7.47 — HIGH (ref 7.35–7.45)
PH BLD: 7.54 — HIGH (ref 7.35–7.45)
PH UR: 5 — SIGNIFICANT CHANGE UP (ref 5–8)
PH UR: 6 — SIGNIFICANT CHANGE UP (ref 5–8)
PH UR: 7 — SIGNIFICANT CHANGE UP (ref 5–8)
PHOSPHATE SERPL-MCNC: 2.9 MG/DL — SIGNIFICANT CHANGE UP (ref 2.5–4.5)
PHOSPHATE SERPL-MCNC: 3 MG/DL — SIGNIFICANT CHANGE UP (ref 2.5–4.5)
PHOSPHATE SERPL-MCNC: 3.1 MG/DL — SIGNIFICANT CHANGE UP (ref 2.5–4.5)
PHOSPHATE SERPL-MCNC: 3.2 MG/DL — SIGNIFICANT CHANGE UP (ref 2.5–4.5)
PHOSPHATE SERPL-MCNC: 3.7 MG/DL — SIGNIFICANT CHANGE UP (ref 2.5–4.5)
PLATELET # BLD AUTO: 170 K/UL — SIGNIFICANT CHANGE UP (ref 150–400)
PLATELET # BLD AUTO: 172 K/UL — SIGNIFICANT CHANGE UP (ref 150–400)
PLATELET # BLD AUTO: 174 K/UL — SIGNIFICANT CHANGE UP (ref 150–400)
PLATELET # BLD AUTO: 182 K/UL — SIGNIFICANT CHANGE UP (ref 150–400)
PLATELET # BLD AUTO: 183 K/UL — SIGNIFICANT CHANGE UP (ref 150–400)
PLATELET # BLD AUTO: 196 K/UL — SIGNIFICANT CHANGE UP (ref 150–400)
PLATELET # BLD AUTO: 197 K/UL — SIGNIFICANT CHANGE UP (ref 150–400)
PLATELET # BLD AUTO: 205 K/UL — SIGNIFICANT CHANGE UP (ref 150–400)
PLATELET # BLD AUTO: 208 K/UL — SIGNIFICANT CHANGE UP (ref 150–400)
PLATELET # BLD AUTO: 214 K/UL — SIGNIFICANT CHANGE UP (ref 150–400)
PLATELET # BLD AUTO: 216 K/UL — SIGNIFICANT CHANGE UP (ref 150–400)
PLATELET # BLD AUTO: 217 K/UL — SIGNIFICANT CHANGE UP (ref 150–400)
PLATELET # BLD AUTO: 229 K/UL — SIGNIFICANT CHANGE UP (ref 150–400)
PLATELET # BLD AUTO: 237 K/UL — SIGNIFICANT CHANGE UP (ref 150–400)
PLATELET # BLD AUTO: 241 K/UL — SIGNIFICANT CHANGE UP (ref 150–400)
PLATELET # BLD AUTO: 262 K/UL — SIGNIFICANT CHANGE UP (ref 150–400)
PLATELET # BLD AUTO: 332 K/UL — SIGNIFICANT CHANGE UP (ref 150–400)
PLATELET # BLD AUTO: 379 K/UL — SIGNIFICANT CHANGE UP (ref 150–400)
PLATELET # BLD AUTO: 393 K/UL — SIGNIFICANT CHANGE UP (ref 150–400)
PLATELET # BLD AUTO: 450 K/UL — HIGH (ref 150–400)
PO2 BLDA: 139 MMHG — HIGH (ref 74–108)
PO2 BLDA: 268 MMHG — HIGH (ref 74–108)
PO2 BLDA: 93 MMHG — SIGNIFICANT CHANGE UP (ref 74–108)
PO2 BLDA: 94 MMHG — SIGNIFICANT CHANGE UP (ref 74–108)
POTASSIUM SERPL-MCNC: 3 MMOL/L — LOW (ref 3.5–5.3)
POTASSIUM SERPL-MCNC: 3.2 MMOL/L — LOW (ref 3.5–5.3)
POTASSIUM SERPL-MCNC: 3.3 MMOL/L — LOW (ref 3.5–5.3)
POTASSIUM SERPL-MCNC: 3.4 MMOL/L — LOW (ref 3.5–5.3)
POTASSIUM SERPL-MCNC: 3.4 MMOL/L — LOW (ref 3.5–5.3)
POTASSIUM SERPL-MCNC: 3.5 MMOL/L — SIGNIFICANT CHANGE UP (ref 3.5–5.3)
POTASSIUM SERPL-MCNC: 3.6 MMOL/L — SIGNIFICANT CHANGE UP (ref 3.5–5.3)
POTASSIUM SERPL-MCNC: 3.6 MMOL/L — SIGNIFICANT CHANGE UP (ref 3.5–5.3)
POTASSIUM SERPL-MCNC: 3.7 MMOL/L — SIGNIFICANT CHANGE UP (ref 3.5–5.3)
POTASSIUM SERPL-MCNC: 3.8 MMOL/L — SIGNIFICANT CHANGE UP (ref 3.5–5.3)
POTASSIUM SERPL-MCNC: 3.9 MMOL/L — SIGNIFICANT CHANGE UP (ref 3.5–5.3)
POTASSIUM SERPL-MCNC: 4.2 MMOL/L — SIGNIFICANT CHANGE UP (ref 3.5–5.3)
POTASSIUM SERPL-MCNC: 4.4 MMOL/L — SIGNIFICANT CHANGE UP (ref 3.5–5.3)
POTASSIUM SERPL-MCNC: 4.5 MMOL/L — SIGNIFICANT CHANGE UP (ref 3.5–5.3)
POTASSIUM SERPL-MCNC: 4.5 MMOL/L — SIGNIFICANT CHANGE UP (ref 3.5–5.3)
POTASSIUM SERPL-MCNC: 4.6 MMOL/L — SIGNIFICANT CHANGE UP (ref 3.5–5.3)
POTASSIUM SERPL-MCNC: 4.6 MMOL/L — SIGNIFICANT CHANGE UP (ref 3.5–5.3)
POTASSIUM SERPL-MCNC: 4.7 MMOL/L — SIGNIFICANT CHANGE UP (ref 3.5–5.3)
POTASSIUM SERPL-MCNC: 4.9 MMOL/L — SIGNIFICANT CHANGE UP (ref 3.5–5.3)
POTASSIUM SERPL-MCNC: 5.3 MMOL/L — SIGNIFICANT CHANGE UP (ref 3.5–5.3)
POTASSIUM SERPL-SCNC: 3 MMOL/L — LOW (ref 3.5–5.3)
POTASSIUM SERPL-SCNC: 3.2 MMOL/L — LOW (ref 3.5–5.3)
POTASSIUM SERPL-SCNC: 3.3 MMOL/L — LOW (ref 3.5–5.3)
POTASSIUM SERPL-SCNC: 3.4 MMOL/L — LOW (ref 3.5–5.3)
POTASSIUM SERPL-SCNC: 3.4 MMOL/L — LOW (ref 3.5–5.3)
POTASSIUM SERPL-SCNC: 3.5 MMOL/L — SIGNIFICANT CHANGE UP (ref 3.5–5.3)
POTASSIUM SERPL-SCNC: 3.6 MMOL/L — SIGNIFICANT CHANGE UP (ref 3.5–5.3)
POTASSIUM SERPL-SCNC: 3.6 MMOL/L — SIGNIFICANT CHANGE UP (ref 3.5–5.3)
POTASSIUM SERPL-SCNC: 3.7 MMOL/L — SIGNIFICANT CHANGE UP (ref 3.5–5.3)
POTASSIUM SERPL-SCNC: 3.8 MMOL/L — SIGNIFICANT CHANGE UP (ref 3.5–5.3)
POTASSIUM SERPL-SCNC: 3.9 MMOL/L — SIGNIFICANT CHANGE UP (ref 3.5–5.3)
POTASSIUM SERPL-SCNC: 4.2 MMOL/L — SIGNIFICANT CHANGE UP (ref 3.5–5.3)
POTASSIUM SERPL-SCNC: 4.4 MMOL/L — SIGNIFICANT CHANGE UP (ref 3.5–5.3)
POTASSIUM SERPL-SCNC: 4.5 MMOL/L — SIGNIFICANT CHANGE UP (ref 3.5–5.3)
POTASSIUM SERPL-SCNC: 4.5 MMOL/L — SIGNIFICANT CHANGE UP (ref 3.5–5.3)
POTASSIUM SERPL-SCNC: 4.6 MMOL/L — SIGNIFICANT CHANGE UP (ref 3.5–5.3)
POTASSIUM SERPL-SCNC: 4.6 MMOL/L — SIGNIFICANT CHANGE UP (ref 3.5–5.3)
POTASSIUM SERPL-SCNC: 4.7 MMOL/L — SIGNIFICANT CHANGE UP (ref 3.5–5.3)
POTASSIUM SERPL-SCNC: 4.9 MMOL/L — SIGNIFICANT CHANGE UP (ref 3.5–5.3)
POTASSIUM SERPL-SCNC: 5.3 MMOL/L — SIGNIFICANT CHANGE UP (ref 3.5–5.3)
PROCALCITONIN SERPL-MCNC: 0.16 NG/ML — HIGH (ref 0.02–0.1)
PROCALCITONIN SERPL-MCNC: 4.22 NG/ML — HIGH (ref 0.02–0.1)
PROT SERPL-MCNC: 5.3 GM/DL — LOW (ref 6–8.3)
PROT SERPL-MCNC: 5.4 GM/DL — LOW (ref 6–8.3)
PROT SERPL-MCNC: 5.6 GM/DL — LOW (ref 6–8.3)
PROT SERPL-MCNC: 5.7 GM/DL — LOW (ref 6–8.3)
PROT SERPL-MCNC: 5.8 GM/DL — LOW (ref 6–8.3)
PROT SERPL-MCNC: 5.9 GM/DL — LOW (ref 6–8.3)
PROT SERPL-MCNC: 6 GM/DL — SIGNIFICANT CHANGE UP (ref 6–8.3)
PROT SERPL-MCNC: 6.2 GM/DL — SIGNIFICANT CHANGE UP (ref 6–8.3)
PROT SERPL-MCNC: 7.3 GM/DL — SIGNIFICANT CHANGE UP (ref 6–8.3)
PROT SERPL-MCNC: 7.8 GM/DL — SIGNIFICANT CHANGE UP (ref 6–8.3)
PROT UR-MCNC: 30 MG/DL
PROTHROM AB SERPL-ACNC: 12.6 SEC — SIGNIFICANT CHANGE UP (ref 10.6–13.6)
PROTHROM AB SERPL-ACNC: 12.6 SEC — SIGNIFICANT CHANGE UP (ref 10.6–13.6)
PROTHROM AB SERPL-ACNC: 13.3 SEC — SIGNIFICANT CHANGE UP (ref 10.6–13.6)
PROTHROM AB SERPL-ACNC: 13.5 SEC — SIGNIFICANT CHANGE UP (ref 10.6–13.6)
PROTHROM AB SERPL-ACNC: 14.3 SEC — HIGH (ref 10.6–13.6)
PROTHROM AB SERPL-ACNC: 14.5 SEC — HIGH (ref 10.6–13.6)
PROTHROM AB SERPL-ACNC: 14.6 SEC — HIGH (ref 10.6–13.6)
PROTHROM AB SERPL-ACNC: 14.8 SEC — HIGH (ref 10.6–13.6)
PROTHROM AB SERPL-ACNC: 14.8 SEC — HIGH (ref 10.6–13.6)
PROTHROM AB SERPL-ACNC: 15.2 SEC — HIGH (ref 10.6–13.6)
RAPID RVP RESULT: DETECTED
RAPID RVP RESULT: SIGNIFICANT CHANGE UP
RBC # BLD: 2.98 M/UL — LOW (ref 3.8–5.2)
RBC # BLD: 2.99 M/UL — LOW (ref 3.8–5.2)
RBC # BLD: 3.13 M/UL — LOW (ref 3.8–5.2)
RBC # BLD: 3.2 M/UL — LOW (ref 3.8–5.2)
RBC # BLD: 3.31 M/UL — LOW (ref 3.8–5.2)
RBC # BLD: 3.44 M/UL — LOW (ref 3.8–5.2)
RBC # BLD: 3.45 M/UL — LOW (ref 3.8–5.2)
RBC # BLD: 3.46 M/UL — LOW (ref 3.8–5.2)
RBC # BLD: 3.51 M/UL — LOW (ref 3.8–5.2)
RBC # BLD: 3.54 M/UL — LOW (ref 3.8–5.2)
RBC # BLD: 3.55 M/UL — LOW (ref 3.8–5.2)
RBC # BLD: 3.55 M/UL — LOW (ref 3.8–5.2)
RBC # BLD: 3.57 M/UL — LOW (ref 3.8–5.2)
RBC # BLD: 3.6 M/UL — LOW (ref 3.8–5.2)
RBC # BLD: 3.62 M/UL — LOW (ref 3.8–5.2)
RBC # BLD: 3.62 M/UL — LOW (ref 3.8–5.2)
RBC # BLD: 3.79 M/UL — LOW (ref 3.8–5.2)
RBC # BLD: 3.81 M/UL — SIGNIFICANT CHANGE UP (ref 3.8–5.2)
RBC # BLD: 3.83 M/UL — SIGNIFICANT CHANGE UP (ref 3.8–5.2)
RBC # BLD: 4.12 M/UL — SIGNIFICANT CHANGE UP (ref 3.8–5.2)
RBC # FLD: 14.2 % — SIGNIFICANT CHANGE UP (ref 10.3–14.5)
RBC # FLD: 14.2 % — SIGNIFICANT CHANGE UP (ref 10.3–14.5)
RBC # FLD: 14.3 % — SIGNIFICANT CHANGE UP (ref 10.3–14.5)
RBC # FLD: 14.3 % — SIGNIFICANT CHANGE UP (ref 10.3–14.5)
RBC # FLD: 14.4 % — SIGNIFICANT CHANGE UP (ref 10.3–14.5)
RBC # FLD: 14.4 % — SIGNIFICANT CHANGE UP (ref 10.3–14.5)
RBC # FLD: 14.5 % — SIGNIFICANT CHANGE UP (ref 10.3–14.5)
RBC # FLD: 14.5 % — SIGNIFICANT CHANGE UP (ref 10.3–14.5)
RBC # FLD: 14.6 % — HIGH (ref 10.3–14.5)
RBC # FLD: 14.6 % — HIGH (ref 10.3–14.5)
RBC # FLD: 15.8 % — HIGH (ref 10.3–14.5)
RBC # FLD: 15.9 % — HIGH (ref 10.3–14.5)
RBC # FLD: 16 % — HIGH (ref 10.3–14.5)
RBC # FLD: 16.1 % — HIGH (ref 10.3–14.5)
RBC CASTS # UR COMP ASSIST: ABNORMAL /HPF (ref 0–4)
RBC CASTS # UR COMP ASSIST: SIGNIFICANT CHANGE UP /HPF (ref 0–4)
SAO2 % BLDA: 94 % — SIGNIFICANT CHANGE UP (ref 92–96)
SAO2 % BLDA: 97 % — HIGH (ref 92–96)
SAO2 % BLDA: 99 % — HIGH (ref 92–96)
SAO2 % BLDA: 99 % — HIGH (ref 92–96)
SARS-COV-2 IGG+IGM SERPL QL IA: 12.5 U/ML — HIGH
SARS-COV-2 IGG+IGM SERPL QL IA: 12.8 U/ML — HIGH
SARS-COV-2 IGG+IGM SERPL QL IA: >250 U/ML — HIGH
SARS-COV-2 IGG+IGM SERPL QL IA: POSITIVE
SARS-COV-2 RNA SPEC QL NAA+PROBE: DETECTED
SARS-COV-2 RNA SPEC QL NAA+PROBE: SIGNIFICANT CHANGE UP
SARS-COV-2 RNA SPEC QL NAA+PROBE: SIGNIFICANT CHANGE UP
SODIUM SERPL-SCNC: 123 MMOL/L — LOW (ref 135–145)
SODIUM SERPL-SCNC: 138 MMOL/L — SIGNIFICANT CHANGE UP (ref 135–145)
SODIUM SERPL-SCNC: 139 MMOL/L — SIGNIFICANT CHANGE UP (ref 135–145)
SODIUM SERPL-SCNC: 139 MMOL/L — SIGNIFICANT CHANGE UP (ref 135–145)
SODIUM SERPL-SCNC: 142 MMOL/L — SIGNIFICANT CHANGE UP (ref 135–145)
SODIUM SERPL-SCNC: 143 MMOL/L — SIGNIFICANT CHANGE UP (ref 135–145)
SODIUM SERPL-SCNC: 143 MMOL/L — SIGNIFICANT CHANGE UP (ref 135–145)
SODIUM SERPL-SCNC: 144 MMOL/L — SIGNIFICANT CHANGE UP (ref 135–145)
SODIUM SERPL-SCNC: 147 MMOL/L — HIGH (ref 135–145)
SODIUM SERPL-SCNC: 148 MMOL/L — HIGH (ref 135–145)
SODIUM SERPL-SCNC: 149 MMOL/L — HIGH (ref 135–145)
SODIUM SERPL-SCNC: 150 MMOL/L — HIGH (ref 135–145)
SODIUM SERPL-SCNC: 150 MMOL/L — HIGH (ref 135–145)
SODIUM SERPL-SCNC: 151 MMOL/L — HIGH (ref 135–145)
SODIUM SERPL-SCNC: 152 MMOL/L — HIGH (ref 135–145)
SODIUM SERPL-SCNC: 152 MMOL/L — HIGH (ref 135–145)
SODIUM SERPL-SCNC: 155 MMOL/L — HIGH (ref 135–145)
SODIUM SERPL-SCNC: 158 MMOL/L — HIGH (ref 135–145)
SP GR SPEC: 1.01 — SIGNIFICANT CHANGE UP (ref 1.01–1.02)
SPECIMEN SOURCE: SIGNIFICANT CHANGE UP
TROPONIN I SERPL-MCNC: <.015 NG/ML — SIGNIFICANT CHANGE UP (ref 0.01–0.04)
UROBILINOGEN FLD QL: NEGATIVE MG/DL — SIGNIFICANT CHANGE UP
VANCOMYCIN FLD-MCNC: 12.8 UG/ML — SIGNIFICANT CHANGE UP
WBC # BLD: 10.02 K/UL — SIGNIFICANT CHANGE UP (ref 3.8–10.5)
WBC # BLD: 10.06 K/UL — SIGNIFICANT CHANGE UP (ref 3.8–10.5)
WBC # BLD: 11.2 K/UL — HIGH (ref 3.8–10.5)
WBC # BLD: 11.36 K/UL — HIGH (ref 3.8–10.5)
WBC # BLD: 12.12 K/UL — HIGH (ref 3.8–10.5)
WBC # BLD: 12.57 K/UL — HIGH (ref 3.8–10.5)
WBC # BLD: 13.21 K/UL — HIGH (ref 3.8–10.5)
WBC # BLD: 14.04 K/UL — HIGH (ref 3.8–10.5)
WBC # BLD: 5.45 K/UL — SIGNIFICANT CHANGE UP (ref 3.8–10.5)
WBC # BLD: 5.46 K/UL — SIGNIFICANT CHANGE UP (ref 3.8–10.5)
WBC # BLD: 5.49 K/UL — SIGNIFICANT CHANGE UP (ref 3.8–10.5)
WBC # BLD: 6.94 K/UL — SIGNIFICANT CHANGE UP (ref 3.8–10.5)
WBC # BLD: 7.18 K/UL — SIGNIFICANT CHANGE UP (ref 3.8–10.5)
WBC # BLD: 7.46 K/UL — SIGNIFICANT CHANGE UP (ref 3.8–10.5)
WBC # BLD: 8.26 K/UL — SIGNIFICANT CHANGE UP (ref 3.8–10.5)
WBC # BLD: 8.44 K/UL — SIGNIFICANT CHANGE UP (ref 3.8–10.5)
WBC # BLD: 8.71 K/UL — SIGNIFICANT CHANGE UP (ref 3.8–10.5)
WBC # BLD: 8.78 K/UL — SIGNIFICANT CHANGE UP (ref 3.8–10.5)
WBC # BLD: 9.15 K/UL — SIGNIFICANT CHANGE UP (ref 3.8–10.5)
WBC # BLD: 9.55 K/UL — SIGNIFICANT CHANGE UP (ref 3.8–10.5)
WBC # FLD AUTO: 10.02 K/UL — SIGNIFICANT CHANGE UP (ref 3.8–10.5)
WBC # FLD AUTO: 10.06 K/UL — SIGNIFICANT CHANGE UP (ref 3.8–10.5)
WBC # FLD AUTO: 11.2 K/UL — HIGH (ref 3.8–10.5)
WBC # FLD AUTO: 11.36 K/UL — HIGH (ref 3.8–10.5)
WBC # FLD AUTO: 12.12 K/UL — HIGH (ref 3.8–10.5)
WBC # FLD AUTO: 12.57 K/UL — HIGH (ref 3.8–10.5)
WBC # FLD AUTO: 13.21 K/UL — HIGH (ref 3.8–10.5)
WBC # FLD AUTO: 14.04 K/UL — HIGH (ref 3.8–10.5)
WBC # FLD AUTO: 5.45 K/UL — SIGNIFICANT CHANGE UP (ref 3.8–10.5)
WBC # FLD AUTO: 5.46 K/UL — SIGNIFICANT CHANGE UP (ref 3.8–10.5)
WBC # FLD AUTO: 5.49 K/UL — SIGNIFICANT CHANGE UP (ref 3.8–10.5)
WBC # FLD AUTO: 6.94 K/UL — SIGNIFICANT CHANGE UP (ref 3.8–10.5)
WBC # FLD AUTO: 7.18 K/UL — SIGNIFICANT CHANGE UP (ref 3.8–10.5)
WBC # FLD AUTO: 7.46 K/UL — SIGNIFICANT CHANGE UP (ref 3.8–10.5)
WBC # FLD AUTO: 8.26 K/UL — SIGNIFICANT CHANGE UP (ref 3.8–10.5)
WBC # FLD AUTO: 8.44 K/UL — SIGNIFICANT CHANGE UP (ref 3.8–10.5)
WBC # FLD AUTO: 8.71 K/UL — SIGNIFICANT CHANGE UP (ref 3.8–10.5)
WBC # FLD AUTO: 8.78 K/UL — SIGNIFICANT CHANGE UP (ref 3.8–10.5)
WBC # FLD AUTO: 9.15 K/UL — SIGNIFICANT CHANGE UP (ref 3.8–10.5)
WBC # FLD AUTO: 9.55 K/UL — SIGNIFICANT CHANGE UP (ref 3.8–10.5)
WBC UR QL: ABNORMAL
WBC UR QL: ABNORMAL

## 2021-01-01 PROCEDURE — 93010 ELECTROCARDIOGRAM REPORT: CPT

## 2021-01-01 PROCEDURE — 99232 SBSQ HOSP IP/OBS MODERATE 35: CPT

## 2021-01-01 PROCEDURE — 99291 CRITICAL CARE FIRST HOUR: CPT

## 2021-01-01 PROCEDURE — 99233 SBSQ HOSP IP/OBS HIGH 50: CPT

## 2021-01-01 PROCEDURE — 99291 CRITICAL CARE FIRST HOUR: CPT | Mod: CS,25

## 2021-01-01 PROCEDURE — 71045 X-RAY EXAM CHEST 1 VIEW: CPT | Mod: 26

## 2021-01-01 PROCEDURE — 99497 ADVNCD CARE PLAN 30 MIN: CPT

## 2021-01-01 PROCEDURE — 74018 RADEX ABDOMEN 1 VIEW: CPT | Mod: 26

## 2021-01-01 PROCEDURE — 74230 X-RAY XM SWLNG FUNCJ C+: CPT

## 2021-01-01 PROCEDURE — 99223 1ST HOSP IP/OBS HIGH 75: CPT

## 2021-01-01 PROCEDURE — 74230 X-RAY XM SWLNG FUNCJ C+: CPT | Mod: 26

## 2021-01-01 PROCEDURE — 70450 CT HEAD/BRAIN W/O DYE: CPT | Mod: 26,MA

## 2021-01-01 PROCEDURE — 43752 NASAL/OROGASTRIC W/TUBE PLMT: CPT

## 2021-01-01 PROCEDURE — 71045 X-RAY EXAM CHEST 1 VIEW: CPT | Mod: 26,76

## 2021-01-01 PROCEDURE — 99239 HOSP IP/OBS DSCHRG MGMT >30: CPT

## 2021-01-01 PROCEDURE — 74177 CT ABD & PELVIS W/CONTRAST: CPT | Mod: 26,MA

## 2021-01-01 PROCEDURE — 71260 CT THORAX DX C+: CPT | Mod: 26,MA

## 2021-01-01 PROCEDURE — 71045 X-RAY EXAM CHEST 1 VIEW: CPT | Mod: 26,77

## 2021-01-01 PROCEDURE — 99285 EMERGENCY DEPT VISIT HI MDM: CPT | Mod: CS

## 2021-01-01 PROCEDURE — 31500 INSERT EMERGENCY AIRWAY: CPT

## 2021-01-01 PROCEDURE — 99497 ADVNCD CARE PLAN 30 MIN: CPT | Mod: 25

## 2021-01-01 PROCEDURE — 92611 MOTION FLUOROSCOPY/SWALLOW: CPT | Mod: GN

## 2021-01-01 PROCEDURE — 99292 CRITICAL CARE ADDL 30 MIN: CPT | Mod: CS,25

## 2021-01-01 PROCEDURE — 76775 US EXAM ABDO BACK WALL LIM: CPT | Mod: 26

## 2021-01-01 RX ORDER — SODIUM CHLORIDE 9 MG/ML
1000 INJECTION, SOLUTION INTRAVENOUS
Refills: 0 | Status: COMPLETED | OUTPATIENT
Start: 2021-01-01 | End: 2021-01-01

## 2021-01-01 RX ORDER — SODIUM CHLORIDE 9 MG/ML
1000 INJECTION, SOLUTION INTRAVENOUS
Refills: 0 | Status: DISCONTINUED | OUTPATIENT
Start: 2021-01-01 | End: 2021-01-01

## 2021-01-01 RX ORDER — DOCUSATE SODIUM 100 MG
2 CAPSULE ORAL
Qty: 0 | Refills: 0 | DISCHARGE

## 2021-01-01 RX ORDER — PROPOFOL 10 MG/ML
10 INJECTION, EMULSION INTRAVENOUS
Qty: 1000 | Refills: 0 | Status: DISCONTINUED | OUTPATIENT
Start: 2021-01-01 | End: 2021-01-01

## 2021-01-01 RX ORDER — POTASSIUM CHLORIDE 20 MEQ
10 PACKET (EA) ORAL ONCE
Refills: 0 | Status: COMPLETED | OUTPATIENT
Start: 2021-01-01 | End: 2021-01-01

## 2021-01-01 RX ORDER — METOPROLOL TARTRATE 50 MG
25 TABLET ORAL DAILY
Refills: 0 | Status: DISCONTINUED | OUTPATIENT
Start: 2021-01-01 | End: 2021-01-01

## 2021-01-01 RX ORDER — POTASSIUM CHLORIDE 20 MEQ
1 PACKET (EA) ORAL
Qty: 15 | Refills: 0
Start: 2021-01-01 | End: 2021-01-01

## 2021-01-01 RX ORDER — ETOMIDATE 2 MG/ML
20 INJECTION INTRAVENOUS ONCE
Refills: 0 | Status: COMPLETED | OUTPATIENT
Start: 2021-01-01 | End: 2021-01-01

## 2021-01-01 RX ORDER — ACETAMINOPHEN 500 MG
650 TABLET ORAL ONCE
Refills: 0 | Status: COMPLETED | OUTPATIENT
Start: 2021-01-01 | End: 2021-01-01

## 2021-01-01 RX ORDER — REMDESIVIR 5 MG/ML
INJECTION INTRAVENOUS
Refills: 0 | Status: COMPLETED | OUTPATIENT
Start: 2021-01-01 | End: 2021-01-01

## 2021-01-01 RX ORDER — ACETAMINOPHEN 500 MG
650 TABLET ORAL EVERY 6 HOURS
Refills: 0 | Status: DISCONTINUED | OUTPATIENT
Start: 2021-01-01 | End: 2021-01-01

## 2021-01-01 RX ORDER — ALBUTEROL 90 UG/1
2 AEROSOL, METERED ORAL EVERY 6 HOURS
Refills: 0 | Status: DISCONTINUED | OUTPATIENT
Start: 2021-01-01 | End: 2021-01-01

## 2021-01-01 RX ORDER — CEFTRIAXONE 500 MG/1
1000 INJECTION, POWDER, FOR SOLUTION INTRAMUSCULAR; INTRAVENOUS EVERY 24 HOURS
Refills: 0 | Status: DISCONTINUED | OUTPATIENT
Start: 2021-01-01 | End: 2021-01-01

## 2021-01-01 RX ORDER — ASPIRIN/CALCIUM CARB/MAGNESIUM 324 MG
81 TABLET ORAL DAILY
Refills: 0 | Status: DISCONTINUED | OUTPATIENT
Start: 2021-01-01 | End: 2021-01-01

## 2021-01-01 RX ORDER — SCOPALAMINE 1 MG/3D
1 PATCH, EXTENDED RELEASE TRANSDERMAL
Refills: 0 | Status: DISCONTINUED | OUTPATIENT
Start: 2021-01-01 | End: 2021-01-01

## 2021-01-01 RX ORDER — SPIRONOLACTONE 25 MG/1
1 TABLET, FILM COATED ORAL
Qty: 0 | Refills: 0 | DISCHARGE

## 2021-01-01 RX ORDER — SODIUM CHLORIDE 9 MG/ML
1000 INJECTION INTRAMUSCULAR; INTRAVENOUS; SUBCUTANEOUS ONCE
Refills: 0 | Status: COMPLETED | OUTPATIENT
Start: 2021-01-01 | End: 2021-01-01

## 2021-01-01 RX ORDER — ATORVASTATIN CALCIUM 80 MG/1
20 TABLET, FILM COATED ORAL AT BEDTIME
Refills: 0 | Status: DISCONTINUED | OUTPATIENT
Start: 2021-01-01 | End: 2021-01-01

## 2021-01-01 RX ORDER — PIPERACILLIN AND TAZOBACTAM 4; .5 G/20ML; G/20ML
3.38 INJECTION, POWDER, LYOPHILIZED, FOR SOLUTION INTRAVENOUS EVERY 8 HOURS
Refills: 0 | Status: DISCONTINUED | OUTPATIENT
Start: 2021-01-01 | End: 2021-01-01

## 2021-01-01 RX ORDER — ACETAMINOPHEN 500 MG
1000 TABLET ORAL ONCE
Refills: 0 | Status: COMPLETED | OUTPATIENT
Start: 2021-01-01 | End: 2021-01-01

## 2021-01-01 RX ORDER — ATORVASTATIN CALCIUM 80 MG/1
80 TABLET, FILM COATED ORAL AT BEDTIME
Refills: 0 | Status: DISCONTINUED | OUTPATIENT
Start: 2021-01-01 | End: 2021-01-01

## 2021-01-01 RX ORDER — AMLODIPINE BESYLATE 2.5 MG/1
2.5 TABLET ORAL DAILY
Refills: 0 | Status: DISCONTINUED | OUTPATIENT
Start: 2021-01-01 | End: 2021-01-01

## 2021-01-01 RX ORDER — SODIUM CHLORIDE 9 MG/ML
2300 INJECTION, SOLUTION INTRAVENOUS ONCE
Refills: 0 | Status: COMPLETED | OUTPATIENT
Start: 2021-01-01 | End: 2021-01-01

## 2021-01-01 RX ORDER — IBUPROFEN 200 MG
400 TABLET ORAL ONCE
Refills: 0 | Status: COMPLETED | OUTPATIENT
Start: 2021-01-01 | End: 2021-01-01

## 2021-01-01 RX ORDER — CEFTRIAXONE 500 MG/1
1000 INJECTION, POWDER, FOR SOLUTION INTRAMUSCULAR; INTRAVENOUS ONCE
Refills: 0 | Status: COMPLETED | OUTPATIENT
Start: 2021-01-01 | End: 2021-01-01

## 2021-01-01 RX ORDER — FUROSEMIDE 40 MG
20 TABLET ORAL DAILY
Refills: 0 | Status: DISCONTINUED | OUTPATIENT
Start: 2021-01-01 | End: 2021-01-01

## 2021-01-01 RX ORDER — ROCURONIUM BROMIDE 10 MG/ML
100 VIAL (ML) INTRAVENOUS ONCE
Refills: 0 | Status: COMPLETED | OUTPATIENT
Start: 2021-01-01 | End: 2021-01-01

## 2021-01-01 RX ORDER — METOPROLOL TARTRATE 50 MG
12.5 TABLET ORAL
Refills: 0 | Status: DISCONTINUED | OUTPATIENT
Start: 2021-01-01 | End: 2021-01-01

## 2021-01-01 RX ORDER — MORPHINE SULFATE 50 MG/1
2 CAPSULE, EXTENDED RELEASE ORAL
Refills: 0 | Status: DISCONTINUED | OUTPATIENT
Start: 2021-01-01 | End: 2021-01-01

## 2021-01-01 RX ORDER — DEXAMETHASONE 0.5 MG/5ML
6 ELIXIR ORAL DAILY
Refills: 0 | Status: DISCONTINUED | OUTPATIENT
Start: 2021-01-01 | End: 2021-01-01

## 2021-01-01 RX ORDER — AMLODIPINE BESYLATE 2.5 MG/1
5 TABLET ORAL DAILY
Refills: 0 | Status: DISCONTINUED | OUTPATIENT
Start: 2021-01-01 | End: 2021-01-01

## 2021-01-01 RX ORDER — ASPIRIN/CALCIUM CARB/MAGNESIUM 324 MG
1 TABLET ORAL
Qty: 0 | Refills: 0 | DISCHARGE

## 2021-01-01 RX ORDER — ACETAMINOPHEN 500 MG
2 TABLET ORAL
Qty: 0 | Refills: 0 | DISCHARGE
Start: 2021-01-01

## 2021-01-01 RX ORDER — MORPHINE SULFATE 50 MG/1
1 CAPSULE, EXTENDED RELEASE ORAL EVERY 6 HOURS
Refills: 0 | Status: DISCONTINUED | OUTPATIENT
Start: 2021-01-01 | End: 2021-01-01

## 2021-01-01 RX ORDER — MORPHINE SULFATE 50 MG/1
0.5 CAPSULE, EXTENDED RELEASE ORAL EVERY 6 HOURS
Refills: 0 | Status: DISCONTINUED | OUTPATIENT
Start: 2021-01-01 | End: 2021-01-01

## 2021-01-01 RX ORDER — IPRATROPIUM/ALBUTEROL SULFATE 18-103MCG
3 AEROSOL WITH ADAPTER (GRAM) INHALATION EVERY 6 HOURS
Refills: 0 | Status: DISCONTINUED | OUTPATIENT
Start: 2021-01-01 | End: 2021-01-01

## 2021-01-01 RX ORDER — MORPHINE SULFATE 50 MG/1
0 CAPSULE, EXTENDED RELEASE ORAL
Qty: 0 | Refills: 0 | DISCHARGE
Start: 2021-01-01

## 2021-01-01 RX ORDER — SODIUM CHLORIDE 9 MG/ML
500 INJECTION, SOLUTION INTRAVENOUS
Refills: 0 | Status: DISCONTINUED | OUTPATIENT
Start: 2021-01-01 | End: 2021-01-01

## 2021-01-01 RX ORDER — PIPERACILLIN AND TAZOBACTAM 4; .5 G/20ML; G/20ML
3.38 INJECTION, POWDER, LYOPHILIZED, FOR SOLUTION INTRAVENOUS ONCE
Refills: 0 | Status: COMPLETED | OUTPATIENT
Start: 2021-01-01 | End: 2021-01-01

## 2021-01-01 RX ORDER — METOPROLOL TARTRATE 50 MG
1 TABLET ORAL
Qty: 0 | Refills: 0 | DISCHARGE
Start: 2021-01-01

## 2021-01-01 RX ORDER — FENTANYL CITRATE 50 UG/ML
0.5 INJECTION INTRAVENOUS
Qty: 2500 | Refills: 0 | Status: DISCONTINUED | OUTPATIENT
Start: 2021-01-01 | End: 2021-01-01

## 2021-01-01 RX ORDER — PIPERACILLIN AND TAZOBACTAM 4; .5 G/20ML; G/20ML
3.38 INJECTION, POWDER, LYOPHILIZED, FOR SOLUTION INTRAVENOUS EVERY 12 HOURS
Refills: 0 | Status: COMPLETED | OUTPATIENT
Start: 2021-01-01 | End: 2021-01-01

## 2021-01-01 RX ORDER — SODIUM CHLORIDE 9 MG/ML
1000 INJECTION, SOLUTION INTRAVENOUS ONCE
Refills: 0 | Status: COMPLETED | OUTPATIENT
Start: 2021-01-01 | End: 2021-01-01

## 2021-01-01 RX ORDER — ENOXAPARIN SODIUM 100 MG/ML
40 INJECTION SUBCUTANEOUS EVERY 24 HOURS
Refills: 0 | Status: DISCONTINUED | OUTPATIENT
Start: 2021-01-01 | End: 2021-01-01

## 2021-01-01 RX ORDER — CHLORHEXIDINE GLUCONATE 213 G/1000ML
15 SOLUTION TOPICAL EVERY 12 HOURS
Refills: 0 | Status: DISCONTINUED | OUTPATIENT
Start: 2021-01-01 | End: 2021-01-01

## 2021-01-01 RX ORDER — SODIUM CHLORIDE 9 MG/ML
1000 INJECTION, SOLUTION INTRAVENOUS
Qty: 0 | Refills: 0 | DISCHARGE
Start: 2021-01-01

## 2021-01-01 RX ORDER — POTASSIUM CHLORIDE 20 MEQ
40 PACKET (EA) ORAL
Refills: 0 | Status: COMPLETED | OUTPATIENT
Start: 2021-01-01 | End: 2021-01-01

## 2021-01-01 RX ORDER — HEPARIN SODIUM 5000 [USP'U]/ML
5000 INJECTION INTRAVENOUS; SUBCUTANEOUS EVERY 12 HOURS
Refills: 0 | Status: DISCONTINUED | OUTPATIENT
Start: 2021-01-01 | End: 2021-01-01

## 2021-01-01 RX ORDER — AMLODIPINE BESYLATE 2.5 MG/1
1 TABLET ORAL
Qty: 0 | Refills: 0 | DISCHARGE

## 2021-01-01 RX ORDER — CHLORHEXIDINE GLUCONATE 213 G/1000ML
1 SOLUTION TOPICAL
Refills: 0 | Status: DISCONTINUED | OUTPATIENT
Start: 2021-01-01 | End: 2021-01-01

## 2021-01-01 RX ORDER — ETHACRYNIC ACID 25 MG/1
25 TABLET ORAL DAILY
Refills: 0 | Status: DISCONTINUED | OUTPATIENT
Start: 2021-01-01 | End: 2021-01-01

## 2021-01-01 RX ORDER — FUROSEMIDE 40 MG
2 TABLET ORAL
Qty: 0 | Refills: 0 | DISCHARGE

## 2021-01-01 RX ORDER — REMDESIVIR 5 MG/ML
200 INJECTION INTRAVENOUS EVERY 24 HOURS
Refills: 0 | Status: COMPLETED | OUTPATIENT
Start: 2021-01-01 | End: 2021-01-01

## 2021-01-01 RX ORDER — PANTOPRAZOLE SODIUM 20 MG/1
40 TABLET, DELAYED RELEASE ORAL DAILY
Refills: 0 | Status: DISCONTINUED | OUTPATIENT
Start: 2021-01-01 | End: 2021-01-01

## 2021-01-01 RX ORDER — ALBUTEROL 90 UG/1
2 AEROSOL, METERED ORAL
Qty: 0 | Refills: 0 | DISCHARGE
Start: 2021-01-01

## 2021-01-01 RX ORDER — VANCOMYCIN HCL 1 G
2.5 VIAL (EA) INTRAVENOUS
Qty: 0 | Refills: 0 | DISCHARGE

## 2021-01-01 RX ORDER — MORPHINE SULFATE 50 MG/1
1 CAPSULE, EXTENDED RELEASE ORAL EVERY 8 HOURS
Refills: 0 | Status: DISCONTINUED | OUTPATIENT
Start: 2021-01-01 | End: 2021-01-01

## 2021-01-01 RX ORDER — POTASSIUM CHLORIDE 20 MEQ
20 PACKET (EA) ORAL
Refills: 0 | Status: COMPLETED | OUTPATIENT
Start: 2021-01-01 | End: 2021-01-01

## 2021-01-01 RX ORDER — MIDODRINE HYDROCHLORIDE 2.5 MG/1
10 TABLET ORAL EVERY 8 HOURS
Refills: 0 | Status: DISCONTINUED | OUTPATIENT
Start: 2021-01-01 | End: 2021-01-01

## 2021-01-01 RX ORDER — FUROSEMIDE 40 MG
1 TABLET ORAL
Qty: 0 | Refills: 0 | DISCHARGE
Start: 2021-01-01

## 2021-01-01 RX ORDER — HEPARIN SODIUM 5000 [USP'U]/ML
5000 INJECTION INTRAVENOUS; SUBCUTANEOUS EVERY 8 HOURS
Refills: 0 | Status: COMPLETED | OUTPATIENT
Start: 2021-01-01 | End: 2021-01-01

## 2021-01-01 RX ORDER — ATORVASTATIN CALCIUM 80 MG/1
1 TABLET, FILM COATED ORAL
Qty: 0 | Refills: 0 | DISCHARGE

## 2021-01-01 RX ORDER — NOREPINEPHRINE BITARTRATE/D5W 8 MG/250ML
0.05 PLASTIC BAG, INJECTION (ML) INTRAVENOUS
Qty: 8 | Refills: 0 | Status: DISCONTINUED | OUTPATIENT
Start: 2021-01-01 | End: 2021-01-01

## 2021-01-01 RX ORDER — AMIKACIN SULFATE 250 MG/ML
425 INJECTION, SOLUTION INTRAMUSCULAR; INTRAVENOUS ONCE
Refills: 0 | Status: COMPLETED | OUTPATIENT
Start: 2021-01-01 | End: 2021-01-01

## 2021-01-01 RX ORDER — ATORVASTATIN CALCIUM 80 MG/1
1 TABLET, FILM COATED ORAL
Qty: 0 | Refills: 0 | DISCHARGE
Start: 2021-01-01

## 2021-01-01 RX ORDER — VANCOMYCIN HCL 1 G
1000 VIAL (EA) INTRAVENOUS ONCE
Refills: 0 | Status: COMPLETED | OUTPATIENT
Start: 2021-01-01 | End: 2021-01-01

## 2021-01-01 RX ORDER — VANCOMYCIN HCL 1 G
125 VIAL (EA) INTRAVENOUS
Refills: 0 | Status: COMPLETED | OUTPATIENT
Start: 2021-01-01 | End: 2021-01-01

## 2021-01-01 RX ORDER — SCOPALAMINE 1 MG/3D
0 PATCH, EXTENDED RELEASE TRANSDERMAL
Qty: 0 | Refills: 0 | DISCHARGE
Start: 2021-01-01

## 2021-01-01 RX ORDER — FUROSEMIDE 40 MG
20 TABLET ORAL ONCE
Refills: 0 | Status: COMPLETED | OUTPATIENT
Start: 2021-01-01 | End: 2021-01-01

## 2021-01-01 RX ORDER — SODIUM CHLORIDE 9 MG/ML
2100 INJECTION, SOLUTION INTRAVENOUS ONCE
Refills: 0 | Status: COMPLETED | OUTPATIENT
Start: 2021-01-01 | End: 2021-01-01

## 2021-01-01 RX ORDER — REMDESIVIR 5 MG/ML
100 INJECTION INTRAVENOUS EVERY 24 HOURS
Refills: 0 | Status: COMPLETED | OUTPATIENT
Start: 2021-01-01 | End: 2021-01-01

## 2021-01-01 RX ADMIN — ATORVASTATIN CALCIUM 20 MILLIGRAM(S): 80 TABLET, FILM COATED ORAL at 22:27

## 2021-01-01 RX ADMIN — CHLORHEXIDINE GLUCONATE 1 APPLICATION(S): 213 SOLUTION TOPICAL at 05:38

## 2021-01-01 RX ADMIN — MORPHINE SULFATE 1 MILLIGRAM(S): 50 CAPSULE, EXTENDED RELEASE ORAL at 06:26

## 2021-01-01 RX ADMIN — Medication 12.5 MILLIGRAM(S): at 06:04

## 2021-01-01 RX ADMIN — ATORVASTATIN CALCIUM 20 MILLIGRAM(S): 80 TABLET, FILM COATED ORAL at 22:17

## 2021-01-01 RX ADMIN — MORPHINE SULFATE 0.5 MILLIGRAM(S): 50 CAPSULE, EXTENDED RELEASE ORAL at 11:56

## 2021-01-01 RX ADMIN — SODIUM CHLORIDE 2300 MILLILITER(S): 9 INJECTION, SOLUTION INTRAVENOUS at 17:38

## 2021-01-01 RX ADMIN — ENOXAPARIN SODIUM 40 MILLIGRAM(S): 100 INJECTION SUBCUTANEOUS at 06:23

## 2021-01-01 RX ADMIN — Medication 25 MILLIGRAM(S): at 05:35

## 2021-01-01 RX ADMIN — Medication 200 MILLIGRAM(S): at 14:34

## 2021-01-01 RX ADMIN — HEPARIN SODIUM 5000 UNIT(S): 5000 INJECTION INTRAVENOUS; SUBCUTANEOUS at 13:26

## 2021-01-01 RX ADMIN — MORPHINE SULFATE 0.5 MILLIGRAM(S): 50 CAPSULE, EXTENDED RELEASE ORAL at 18:00

## 2021-01-01 RX ADMIN — MORPHINE SULFATE 1 MILLIGRAM(S): 50 CAPSULE, EXTENDED RELEASE ORAL at 12:30

## 2021-01-01 RX ADMIN — MORPHINE SULFATE 1 MILLIGRAM(S): 50 CAPSULE, EXTENDED RELEASE ORAL at 18:00

## 2021-01-01 RX ADMIN — SODIUM CHLORIDE 100 MILLILITER(S): 9 INJECTION, SOLUTION INTRAVENOUS at 10:19

## 2021-01-01 RX ADMIN — PIPERACILLIN AND TAZOBACTAM 25 GRAM(S): 4; .5 INJECTION, POWDER, LYOPHILIZED, FOR SOLUTION INTRAVENOUS at 17:23

## 2021-01-01 RX ADMIN — REMDESIVIR 500 MILLIGRAM(S): 5 INJECTION INTRAVENOUS at 16:22

## 2021-01-01 RX ADMIN — PIPERACILLIN AND TAZOBACTAM 25 GRAM(S): 4; .5 INJECTION, POWDER, LYOPHILIZED, FOR SOLUTION INTRAVENOUS at 07:04

## 2021-01-01 RX ADMIN — Medication 81 MILLIGRAM(S): at 11:48

## 2021-01-01 RX ADMIN — ATORVASTATIN CALCIUM 20 MILLIGRAM(S): 80 TABLET, FILM COATED ORAL at 21:16

## 2021-01-01 RX ADMIN — MORPHINE SULFATE 1 MILLIGRAM(S): 50 CAPSULE, EXTENDED RELEASE ORAL at 19:20

## 2021-01-01 RX ADMIN — Medication 81 MILLIGRAM(S): at 14:36

## 2021-01-01 RX ADMIN — Medication 400 MILLIGRAM(S): at 02:41

## 2021-01-01 RX ADMIN — Medication 650 MILLIGRAM(S): at 00:35

## 2021-01-01 RX ADMIN — ETOMIDATE 20 MILLIGRAM(S): 2 INJECTION INTRAVENOUS at 10:02

## 2021-01-01 RX ADMIN — HEPARIN SODIUM 5000 UNIT(S): 5000 INJECTION INTRAVENOUS; SUBCUTANEOUS at 05:24

## 2021-01-01 RX ADMIN — PIPERACILLIN AND TAZOBACTAM 25 GRAM(S): 4; .5 INJECTION, POWDER, LYOPHILIZED, FOR SOLUTION INTRAVENOUS at 05:57

## 2021-01-01 RX ADMIN — MORPHINE SULFATE 1 MILLIGRAM(S): 50 CAPSULE, EXTENDED RELEASE ORAL at 07:52

## 2021-01-01 RX ADMIN — HEPARIN SODIUM 5000 UNIT(S): 5000 INJECTION INTRAVENOUS; SUBCUTANEOUS at 17:33

## 2021-01-01 RX ADMIN — Medication 20 MILLIGRAM(S): at 05:50

## 2021-01-01 RX ADMIN — ATORVASTATIN CALCIUM 20 MILLIGRAM(S): 80 TABLET, FILM COATED ORAL at 21:21

## 2021-01-01 RX ADMIN — ATORVASTATIN CALCIUM 20 MILLIGRAM(S): 80 TABLET, FILM COATED ORAL at 22:46

## 2021-01-01 RX ADMIN — Medication 650 MILLIGRAM(S): at 00:44

## 2021-01-01 RX ADMIN — MORPHINE SULFATE 1 MILLIGRAM(S): 50 CAPSULE, EXTENDED RELEASE ORAL at 13:30

## 2021-01-01 RX ADMIN — PIPERACILLIN AND TAZOBACTAM 25 GRAM(S): 4; .5 INJECTION, POWDER, LYOPHILIZED, FOR SOLUTION INTRAVENOUS at 07:09

## 2021-01-01 RX ADMIN — SODIUM CHLORIDE 80 MILLILITER(S): 9 INJECTION, SOLUTION INTRAVENOUS at 05:41

## 2021-01-01 RX ADMIN — CHLORHEXIDINE GLUCONATE 15 MILLILITER(S): 213 SOLUTION TOPICAL at 18:02

## 2021-01-01 RX ADMIN — ALBUTEROL 2 PUFF(S): 90 AEROSOL, METERED ORAL at 01:41

## 2021-01-01 RX ADMIN — Medication 650 MILLIGRAM(S): at 14:00

## 2021-01-01 RX ADMIN — Medication 10 MILLIEQUIVALENT(S): at 16:20

## 2021-01-01 RX ADMIN — HEPARIN SODIUM 5000 UNIT(S): 5000 INJECTION INTRAVENOUS; SUBCUTANEOUS at 18:35

## 2021-01-01 RX ADMIN — PIPERACILLIN AND TAZOBACTAM 25 GRAM(S): 4; .5 INJECTION, POWDER, LYOPHILIZED, FOR SOLUTION INTRAVENOUS at 17:33

## 2021-01-01 RX ADMIN — Medication 50 MILLIEQUIVALENT(S): at 20:27

## 2021-01-01 RX ADMIN — CHLORHEXIDINE GLUCONATE 1 APPLICATION(S): 213 SOLUTION TOPICAL at 06:00

## 2021-01-01 RX ADMIN — SODIUM CHLORIDE 100 MILLILITER(S): 9 INJECTION, SOLUTION INTRAVENOUS at 16:09

## 2021-01-01 RX ADMIN — AMIKACIN SULFATE 101.7 MILLIGRAM(S): 250 INJECTION, SOLUTION INTRAMUSCULAR; INTRAVENOUS at 16:03

## 2021-01-01 RX ADMIN — PIPERACILLIN AND TAZOBACTAM 200 GRAM(S): 4; .5 INJECTION, POWDER, LYOPHILIZED, FOR SOLUTION INTRAVENOUS at 10:25

## 2021-01-01 RX ADMIN — ATORVASTATIN CALCIUM 20 MILLIGRAM(S): 80 TABLET, FILM COATED ORAL at 22:55

## 2021-01-01 RX ADMIN — Medication 25 MILLIGRAM(S): at 05:42

## 2021-01-01 RX ADMIN — SODIUM CHLORIDE 100 MILLILITER(S): 9 INJECTION, SOLUTION INTRAVENOUS at 21:32

## 2021-01-01 RX ADMIN — Medication 1000 MILLIGRAM(S): at 00:21

## 2021-01-01 RX ADMIN — Medication 6 MILLIGRAM(S): at 05:41

## 2021-01-01 RX ADMIN — PROPOFOL 4.36 MICROGRAM(S)/KG/MIN: 10 INJECTION, EMULSION INTRAVENOUS at 06:08

## 2021-01-01 RX ADMIN — MORPHINE SULFATE 1 MILLIGRAM(S): 50 CAPSULE, EXTENDED RELEASE ORAL at 12:33

## 2021-01-01 RX ADMIN — FENTANYL CITRATE 3.63 MICROGRAM(S)/KG/HR: 50 INJECTION INTRAVENOUS at 06:08

## 2021-01-01 RX ADMIN — SODIUM CHLORIDE 80 MILLILITER(S): 9 INJECTION, SOLUTION INTRAVENOUS at 05:49

## 2021-01-01 RX ADMIN — Medication 1 PATCH: at 07:34

## 2021-01-01 RX ADMIN — Medication 1000 MILLIGRAM(S): at 02:00

## 2021-01-01 RX ADMIN — HEPARIN SODIUM 5000 UNIT(S): 5000 INJECTION INTRAVENOUS; SUBCUTANEOUS at 23:21

## 2021-01-01 RX ADMIN — HEPARIN SODIUM 5000 UNIT(S): 5000 INJECTION INTRAVENOUS; SUBCUTANEOUS at 18:34

## 2021-01-01 RX ADMIN — CEFTRIAXONE 100 MILLIGRAM(S): 500 INJECTION, POWDER, FOR SOLUTION INTRAMUSCULAR; INTRAVENOUS at 17:55

## 2021-01-01 RX ADMIN — Medication 650 MILLIGRAM(S): at 15:43

## 2021-01-01 RX ADMIN — PIPERACILLIN AND TAZOBACTAM 25 GRAM(S): 4; .5 INJECTION, POWDER, LYOPHILIZED, FOR SOLUTION INTRAVENOUS at 18:35

## 2021-01-01 RX ADMIN — Medication 81 MILLIGRAM(S): at 12:38

## 2021-01-01 RX ADMIN — Medication 250 MILLIGRAM(S): at 12:55

## 2021-01-01 RX ADMIN — CHLORHEXIDINE GLUCONATE 1 APPLICATION(S): 213 SOLUTION TOPICAL at 06:04

## 2021-01-01 RX ADMIN — Medication 125 MILLIGRAM(S): at 12:10

## 2021-01-01 RX ADMIN — HEPARIN SODIUM 5000 UNIT(S): 5000 INJECTION INTRAVENOUS; SUBCUTANEOUS at 00:11

## 2021-01-01 RX ADMIN — ALBUTEROL 2 PUFF(S): 90 AEROSOL, METERED ORAL at 21:43

## 2021-01-01 RX ADMIN — HEPARIN SODIUM 5000 UNIT(S): 5000 INJECTION INTRAVENOUS; SUBCUTANEOUS at 21:20

## 2021-01-01 RX ADMIN — SODIUM CHLORIDE 30 MILLILITER(S): 9 INJECTION, SOLUTION INTRAVENOUS at 12:08

## 2021-01-01 RX ADMIN — Medication 81 MILLIGRAM(S): at 13:53

## 2021-01-01 RX ADMIN — Medication 25 MILLIGRAM(S): at 06:22

## 2021-01-01 RX ADMIN — HEPARIN SODIUM 5000 UNIT(S): 5000 INJECTION INTRAVENOUS; SUBCUTANEOUS at 13:24

## 2021-01-01 RX ADMIN — Medication 650 MILLIGRAM(S): at 01:16

## 2021-01-01 RX ADMIN — AMLODIPINE BESYLATE 2.5 MILLIGRAM(S): 2.5 TABLET ORAL at 06:10

## 2021-01-01 RX ADMIN — Medication 6 MILLIGRAM(S): at 05:54

## 2021-01-01 RX ADMIN — Medication 20 MILLIGRAM(S): at 05:13

## 2021-01-01 RX ADMIN — SCOPALAMINE 1 PATCH: 1 PATCH, EXTENDED RELEASE TRANSDERMAL at 07:48

## 2021-01-01 RX ADMIN — MORPHINE SULFATE 1 MILLIGRAM(S): 50 CAPSULE, EXTENDED RELEASE ORAL at 01:20

## 2021-01-01 RX ADMIN — Medication 1 PATCH: at 07:47

## 2021-01-01 RX ADMIN — HEPARIN SODIUM 5000 UNIT(S): 5000 INJECTION INTRAVENOUS; SUBCUTANEOUS at 13:53

## 2021-01-01 RX ADMIN — HEPARIN SODIUM 5000 UNIT(S): 5000 INJECTION INTRAVENOUS; SUBCUTANEOUS at 06:04

## 2021-01-01 RX ADMIN — Medication 40 MILLIEQUIVALENT(S): at 17:59

## 2021-01-01 RX ADMIN — ATORVASTATIN CALCIUM 20 MILLIGRAM(S): 80 TABLET, FILM COATED ORAL at 22:15

## 2021-01-01 RX ADMIN — PIPERACILLIN AND TAZOBACTAM 25 GRAM(S): 4; .5 INJECTION, POWDER, LYOPHILIZED, FOR SOLUTION INTRAVENOUS at 17:55

## 2021-01-01 RX ADMIN — Medication 25 MILLIGRAM(S): at 07:09

## 2021-01-01 RX ADMIN — SODIUM CHLORIDE 80 MILLILITER(S): 9 INJECTION, SOLUTION INTRAVENOUS at 13:30

## 2021-01-01 RX ADMIN — Medication 6 MILLIGRAM(S): at 05:47

## 2021-01-01 RX ADMIN — PIPERACILLIN AND TAZOBACTAM 25 GRAM(S): 4; .5 INJECTION, POWDER, LYOPHILIZED, FOR SOLUTION INTRAVENOUS at 17:46

## 2021-01-01 RX ADMIN — SODIUM CHLORIDE 80 MILLILITER(S): 9 INJECTION, SOLUTION INTRAVENOUS at 11:55

## 2021-01-01 RX ADMIN — SODIUM CHLORIDE 80 MILLILITER(S): 9 INJECTION, SOLUTION INTRAVENOUS at 16:22

## 2021-01-01 RX ADMIN — CHLORHEXIDINE GLUCONATE 1 APPLICATION(S): 213 SOLUTION TOPICAL at 17:56

## 2021-01-01 RX ADMIN — MORPHINE SULFATE 1 MILLIGRAM(S): 50 CAPSULE, EXTENDED RELEASE ORAL at 18:56

## 2021-01-01 RX ADMIN — Medication 200 MILLIGRAM(S): at 06:01

## 2021-01-01 RX ADMIN — Medication 50 MILLIEQUIVALENT(S): at 22:27

## 2021-01-01 RX ADMIN — Medication 200 MILLIGRAM(S): at 21:19

## 2021-01-01 RX ADMIN — Medication 100 MILLIGRAM(S): at 10:27

## 2021-01-01 RX ADMIN — Medication 125 MILLIGRAM(S): at 11:48

## 2021-01-01 RX ADMIN — SODIUM CHLORIDE 999 MILLILITER(S): 9 INJECTION, SOLUTION INTRAVENOUS at 10:42

## 2021-01-01 RX ADMIN — HEPARIN SODIUM 5000 UNIT(S): 5000 INJECTION INTRAVENOUS; SUBCUTANEOUS at 06:01

## 2021-01-01 RX ADMIN — Medication 81 MILLIGRAM(S): at 12:39

## 2021-01-01 RX ADMIN — Medication 81 MILLIGRAM(S): at 11:18

## 2021-01-01 RX ADMIN — ATORVASTATIN CALCIUM 20 MILLIGRAM(S): 80 TABLET, FILM COATED ORAL at 21:52

## 2021-01-01 RX ADMIN — SCOPALAMINE 1 PATCH: 1 PATCH, EXTENDED RELEASE TRANSDERMAL at 19:00

## 2021-01-01 RX ADMIN — HEPARIN SODIUM 5000 UNIT(S): 5000 INJECTION INTRAVENOUS; SUBCUTANEOUS at 06:10

## 2021-01-01 RX ADMIN — HEPARIN SODIUM 5000 UNIT(S): 5000 INJECTION INTRAVENOUS; SUBCUTANEOUS at 07:04

## 2021-01-01 RX ADMIN — HEPARIN SODIUM 5000 UNIT(S): 5000 INJECTION INTRAVENOUS; SUBCUTANEOUS at 05:13

## 2021-01-01 RX ADMIN — SODIUM CHLORIDE 100 MILLILITER(S): 9 INJECTION, SOLUTION INTRAVENOUS at 04:00

## 2021-01-01 RX ADMIN — SODIUM CHLORIDE 1000 MILLILITER(S): 9 INJECTION, SOLUTION INTRAVENOUS at 11:20

## 2021-01-01 RX ADMIN — ENOXAPARIN SODIUM 40 MILLIGRAM(S): 100 INJECTION SUBCUTANEOUS at 05:53

## 2021-01-01 RX ADMIN — ATORVASTATIN CALCIUM 20 MILLIGRAM(S): 80 TABLET, FILM COATED ORAL at 21:20

## 2021-01-01 RX ADMIN — HEPARIN SODIUM 5000 UNIT(S): 5000 INJECTION INTRAVENOUS; SUBCUTANEOUS at 05:17

## 2021-01-01 RX ADMIN — Medication 81 MILLIGRAM(S): at 13:25

## 2021-01-01 RX ADMIN — HEPARIN SODIUM 5000 UNIT(S): 5000 INJECTION INTRAVENOUS; SUBCUTANEOUS at 05:54

## 2021-01-01 RX ADMIN — Medication 400 MILLIGRAM(S): at 01:21

## 2021-01-01 RX ADMIN — REMDESIVIR 500 MILLIGRAM(S): 5 INJECTION INTRAVENOUS at 17:04

## 2021-01-01 RX ADMIN — ENOXAPARIN SODIUM 40 MILLIGRAM(S): 100 INJECTION SUBCUTANEOUS at 05:41

## 2021-01-01 RX ADMIN — HEPARIN SODIUM 5000 UNIT(S): 5000 INJECTION INTRAVENOUS; SUBCUTANEOUS at 15:43

## 2021-01-01 RX ADMIN — SCOPALAMINE 1 PATCH: 1 PATCH, EXTENDED RELEASE TRANSDERMAL at 14:00

## 2021-01-01 RX ADMIN — Medication 1 PATCH: at 19:00

## 2021-01-01 RX ADMIN — Medication 650 MILLIGRAM(S): at 01:05

## 2021-01-01 RX ADMIN — CHLORHEXIDINE GLUCONATE 1 APPLICATION(S): 213 SOLUTION TOPICAL at 05:24

## 2021-01-01 RX ADMIN — PIPERACILLIN AND TAZOBACTAM 25 GRAM(S): 4; .5 INJECTION, POWDER, LYOPHILIZED, FOR SOLUTION INTRAVENOUS at 05:49

## 2021-01-01 RX ADMIN — PIPERACILLIN AND TAZOBACTAM 25 GRAM(S): 4; .5 INJECTION, POWDER, LYOPHILIZED, FOR SOLUTION INTRAVENOUS at 17:29

## 2021-01-01 RX ADMIN — Medication 81 MILLIGRAM(S): at 12:41

## 2021-01-01 RX ADMIN — SODIUM CHLORIDE 80 MILLILITER(S): 9 INJECTION, SOLUTION INTRAVENOUS at 00:15

## 2021-01-01 RX ADMIN — REMDESIVIR 500 MILLIGRAM(S): 5 INJECTION INTRAVENOUS at 16:03

## 2021-01-01 RX ADMIN — MORPHINE SULFATE 1 MILLIGRAM(S): 50 CAPSULE, EXTENDED RELEASE ORAL at 23:48

## 2021-01-01 RX ADMIN — MORPHINE SULFATE 1 MILLIGRAM(S): 50 CAPSULE, EXTENDED RELEASE ORAL at 17:40

## 2021-01-01 RX ADMIN — Medication 1 PATCH: at 19:33

## 2021-01-01 RX ADMIN — Medication 650 MILLIGRAM(S): at 12:14

## 2021-01-01 RX ADMIN — CHLORHEXIDINE GLUCONATE 1 APPLICATION(S): 213 SOLUTION TOPICAL at 05:47

## 2021-01-01 RX ADMIN — MORPHINE SULFATE 0.5 MILLIGRAM(S): 50 CAPSULE, EXTENDED RELEASE ORAL at 12:34

## 2021-01-01 RX ADMIN — PIPERACILLIN AND TAZOBACTAM 25 GRAM(S): 4; .5 INJECTION, POWDER, LYOPHILIZED, FOR SOLUTION INTRAVENOUS at 06:00

## 2021-01-01 RX ADMIN — ENOXAPARIN SODIUM 40 MILLIGRAM(S): 100 INJECTION SUBCUTANEOUS at 05:47

## 2021-01-01 RX ADMIN — CEFTRIAXONE 100 MILLIGRAM(S): 500 INJECTION, POWDER, FOR SOLUTION INTRAMUSCULAR; INTRAVENOUS at 18:45

## 2021-01-01 RX ADMIN — Medication 81 MILLIGRAM(S): at 12:10

## 2021-01-01 RX ADMIN — AMLODIPINE BESYLATE 2.5 MILLIGRAM(S): 2.5 TABLET ORAL at 05:39

## 2021-01-01 RX ADMIN — REMDESIVIR 500 MILLIGRAM(S): 5 INJECTION INTRAVENOUS at 16:54

## 2021-01-01 RX ADMIN — PIPERACILLIN AND TAZOBACTAM 25 GRAM(S): 4; .5 INJECTION, POWDER, LYOPHILIZED, FOR SOLUTION INTRAVENOUS at 05:14

## 2021-01-01 RX ADMIN — SODIUM CHLORIDE 1000 MILLILITER(S): 9 INJECTION, SOLUTION INTRAVENOUS at 12:12

## 2021-01-01 RX ADMIN — SODIUM CHLORIDE 1000 MILLILITER(S): 9 INJECTION INTRAMUSCULAR; INTRAVENOUS; SUBCUTANEOUS at 15:00

## 2021-01-01 RX ADMIN — SODIUM CHLORIDE 100 MILLILITER(S): 9 INJECTION, SOLUTION INTRAVENOUS at 23:39

## 2021-01-01 RX ADMIN — PIPERACILLIN AND TAZOBACTAM 25 GRAM(S): 4; .5 INJECTION, POWDER, LYOPHILIZED, FOR SOLUTION INTRAVENOUS at 22:46

## 2021-01-01 RX ADMIN — Medication 400 MILLIGRAM(S): at 23:17

## 2021-01-01 RX ADMIN — SODIUM CHLORIDE 1000 MILLILITER(S): 9 INJECTION, SOLUTION INTRAVENOUS at 04:21

## 2021-01-01 RX ADMIN — Medication 25 MILLIGRAM(S): at 06:44

## 2021-01-01 RX ADMIN — HEPARIN SODIUM 5000 UNIT(S): 5000 INJECTION INTRAVENOUS; SUBCUTANEOUS at 22:17

## 2021-01-01 RX ADMIN — CHLORHEXIDINE GLUCONATE 15 MILLILITER(S): 213 SOLUTION TOPICAL at 05:15

## 2021-01-01 RX ADMIN — Medication 25 MILLIGRAM(S): at 07:05

## 2021-01-01 RX ADMIN — Medication 12.5 MILLIGRAM(S): at 05:23

## 2021-01-01 RX ADMIN — Medication 6.13 MICROGRAM(S)/KG/MIN: at 19:45

## 2021-01-01 RX ADMIN — HEPARIN SODIUM 5000 UNIT(S): 5000 INJECTION INTRAVENOUS; SUBCUTANEOUS at 13:51

## 2021-01-01 RX ADMIN — HEPARIN SODIUM 5000 UNIT(S): 5000 INJECTION INTRAVENOUS; SUBCUTANEOUS at 21:11

## 2021-01-01 RX ADMIN — HEPARIN SODIUM 5000 UNIT(S): 5000 INJECTION INTRAVENOUS; SUBCUTANEOUS at 07:09

## 2021-01-01 RX ADMIN — HEPARIN SODIUM 5000 UNIT(S): 5000 INJECTION INTRAVENOUS; SUBCUTANEOUS at 05:49

## 2021-01-01 RX ADMIN — HEPARIN SODIUM 5000 UNIT(S): 5000 INJECTION INTRAVENOUS; SUBCUTANEOUS at 22:27

## 2021-01-01 RX ADMIN — ATORVASTATIN CALCIUM 20 MILLIGRAM(S): 80 TABLET, FILM COATED ORAL at 00:11

## 2021-01-01 RX ADMIN — FENTANYL CITRATE 3.63 MICROGRAM(S)/KG/HR: 50 INJECTION INTRAVENOUS at 10:25

## 2021-01-01 RX ADMIN — Medication 650 MILLIGRAM(S): at 22:27

## 2021-01-01 RX ADMIN — MORPHINE SULFATE 1 MILLIGRAM(S): 50 CAPSULE, EXTENDED RELEASE ORAL at 12:00

## 2021-01-01 RX ADMIN — SODIUM CHLORIDE 30 MILLILITER(S): 9 INJECTION, SOLUTION INTRAVENOUS at 22:26

## 2021-01-01 RX ADMIN — Medication 25 MILLIGRAM(S): at 05:57

## 2021-01-01 RX ADMIN — Medication 25 MILLIGRAM(S): at 05:46

## 2021-01-01 RX ADMIN — HEPARIN SODIUM 5000 UNIT(S): 5000 INJECTION INTRAVENOUS; SUBCUTANEOUS at 21:43

## 2021-01-01 RX ADMIN — Medication 6 MILLIGRAM(S): at 05:38

## 2021-01-01 RX ADMIN — CHLORHEXIDINE GLUCONATE 1 APPLICATION(S): 213 SOLUTION TOPICAL at 05:49

## 2021-01-01 RX ADMIN — MORPHINE SULFATE 1 MILLIGRAM(S): 50 CAPSULE, EXTENDED RELEASE ORAL at 00:05

## 2021-01-01 RX ADMIN — Medication 25 MILLIGRAM(S): at 05:39

## 2021-01-01 RX ADMIN — MORPHINE SULFATE 1 MILLIGRAM(S): 50 CAPSULE, EXTENDED RELEASE ORAL at 00:00

## 2021-01-01 RX ADMIN — Medication 81 MILLIGRAM(S): at 15:41

## 2021-01-01 RX ADMIN — PIPERACILLIN AND TAZOBACTAM 25 GRAM(S): 4; .5 INJECTION, POWDER, LYOPHILIZED, FOR SOLUTION INTRAVENOUS at 05:46

## 2021-01-01 RX ADMIN — Medication 40 MILLIEQUIVALENT(S): at 05:44

## 2021-01-01 RX ADMIN — HEPARIN SODIUM 5000 UNIT(S): 5000 INJECTION INTRAVENOUS; SUBCUTANEOUS at 14:35

## 2021-01-01 RX ADMIN — HEPARIN SODIUM 5000 UNIT(S): 5000 INJECTION INTRAVENOUS; SUBCUTANEOUS at 05:57

## 2021-01-01 RX ADMIN — PIPERACILLIN AND TAZOBACTAM 25 GRAM(S): 4; .5 INJECTION, POWDER, LYOPHILIZED, FOR SOLUTION INTRAVENOUS at 05:39

## 2021-01-01 RX ADMIN — ATORVASTATIN CALCIUM 20 MILLIGRAM(S): 80 TABLET, FILM COATED ORAL at 21:11

## 2021-01-01 RX ADMIN — Medication 81 MILLIGRAM(S): at 11:24

## 2021-01-01 RX ADMIN — MORPHINE SULFATE 1 MILLIGRAM(S): 50 CAPSULE, EXTENDED RELEASE ORAL at 06:40

## 2021-01-01 RX ADMIN — Medication 81 MILLIGRAM(S): at 11:55

## 2021-01-01 RX ADMIN — ENOXAPARIN SODIUM 40 MILLIGRAM(S): 100 INJECTION SUBCUTANEOUS at 05:42

## 2021-01-01 RX ADMIN — Medication 25 MILLIGRAM(S): at 06:10

## 2021-01-01 RX ADMIN — Medication 25 MILLIGRAM(S): at 16:21

## 2021-01-01 RX ADMIN — HEPARIN SODIUM 5000 UNIT(S): 5000 INJECTION INTRAVENOUS; SUBCUTANEOUS at 06:00

## 2021-01-01 RX ADMIN — CHLORHEXIDINE GLUCONATE 1 APPLICATION(S): 213 SOLUTION TOPICAL at 05:17

## 2021-01-01 RX ADMIN — Medication 25 MILLIGRAM(S): at 05:37

## 2021-01-01 RX ADMIN — AMLODIPINE BESYLATE 2.5 MILLIGRAM(S): 2.5 TABLET ORAL at 05:35

## 2021-01-01 RX ADMIN — SODIUM CHLORIDE 1000 MILLILITER(S): 9 INJECTION INTRAMUSCULAR; INTRAVENOUS; SUBCUTANEOUS at 10:25

## 2021-01-01 RX ADMIN — Medication 650 MILLIGRAM(S): at 13:14

## 2021-01-01 RX ADMIN — SODIUM CHLORIDE 100 MILLILITER(S): 9 INJECTION, SOLUTION INTRAVENOUS at 10:52

## 2021-01-01 RX ADMIN — Medication 81 MILLIGRAM(S): at 12:12

## 2021-01-01 RX ADMIN — HEPARIN SODIUM 5000 UNIT(S): 5000 INJECTION INTRAVENOUS; SUBCUTANEOUS at 05:44

## 2021-01-01 RX ADMIN — MORPHINE SULFATE 0.5 MILLIGRAM(S): 50 CAPSULE, EXTENDED RELEASE ORAL at 06:30

## 2021-01-01 RX ADMIN — Medication 1 PATCH: at 12:12

## 2021-01-01 RX ADMIN — SODIUM CHLORIDE 80 MILLILITER(S): 9 INJECTION, SOLUTION INTRAVENOUS at 03:00

## 2021-01-01 RX ADMIN — Medication 650 MILLIGRAM(S): at 16:43

## 2021-01-01 RX ADMIN — PANTOPRAZOLE SODIUM 40 MILLIGRAM(S): 20 TABLET, DELAYED RELEASE ORAL at 22:38

## 2021-01-01 RX ADMIN — HEPARIN SODIUM 5000 UNIT(S): 5000 INJECTION INTRAVENOUS; SUBCUTANEOUS at 05:46

## 2021-01-01 RX ADMIN — AMLODIPINE BESYLATE 5 MILLIGRAM(S): 2.5 TABLET ORAL at 06:22

## 2021-01-01 RX ADMIN — SODIUM CHLORIDE 50 MILLILITER(S): 9 INJECTION, SOLUTION INTRAVENOUS at 11:45

## 2021-01-01 RX ADMIN — ATORVASTATIN CALCIUM 20 MILLIGRAM(S): 80 TABLET, FILM COATED ORAL at 21:43

## 2021-01-01 RX ADMIN — Medication 650 MILLIGRAM(S): at 06:01

## 2021-01-01 RX ADMIN — CHLORHEXIDINE GLUCONATE 1 APPLICATION(S): 213 SOLUTION TOPICAL at 05:15

## 2021-01-01 RX ADMIN — Medication 1 PATCH: at 22:21

## 2021-01-01 RX ADMIN — Medication 6 MILLIGRAM(S): at 05:42

## 2021-01-01 RX ADMIN — Medication 20 MILLIGRAM(S): at 05:52

## 2021-01-01 RX ADMIN — HEPARIN SODIUM 5000 UNIT(S): 5000 INJECTION INTRAVENOUS; SUBCUTANEOUS at 17:57

## 2021-01-01 RX ADMIN — ATORVASTATIN CALCIUM 20 MILLIGRAM(S): 80 TABLET, FILM COATED ORAL at 22:10

## 2021-01-01 RX ADMIN — SODIUM CHLORIDE 100 MILLILITER(S): 9 INJECTION, SOLUTION INTRAVENOUS at 07:54

## 2021-01-01 RX ADMIN — Medication 6 MILLIGRAM(S): at 06:23

## 2021-01-01 RX ADMIN — Medication 250 MILLIGRAM(S): at 14:00

## 2021-01-01 RX ADMIN — ATORVASTATIN CALCIUM 20 MILLIGRAM(S): 80 TABLET, FILM COATED ORAL at 22:07

## 2021-01-01 RX ADMIN — SODIUM CHLORIDE 2100 MILLILITER(S): 9 INJECTION, SOLUTION INTRAVENOUS at 15:34

## 2021-01-01 RX ADMIN — PIPERACILLIN AND TAZOBACTAM 200 GRAM(S): 4; .5 INJECTION, POWDER, LYOPHILIZED, FOR SOLUTION INTRAVENOUS at 21:26

## 2021-01-01 RX ADMIN — Medication 81 MILLIGRAM(S): at 16:34

## 2021-01-01 RX ADMIN — SCOPALAMINE 1 PATCH: 1 PATCH, EXTENDED RELEASE TRANSDERMAL at 19:18

## 2021-01-01 RX ADMIN — PIPERACILLIN AND TAZOBACTAM 25 GRAM(S): 4; .5 INJECTION, POWDER, LYOPHILIZED, FOR SOLUTION INTRAVENOUS at 18:10

## 2021-01-01 RX ADMIN — Medication 20 MILLIGRAM(S): at 06:00

## 2021-01-01 RX ADMIN — SCOPALAMINE 1 PATCH: 1 PATCH, EXTENDED RELEASE TRANSDERMAL at 18:00

## 2021-01-01 RX ADMIN — Medication 400 MILLIGRAM(S): at 01:41

## 2021-01-01 RX ADMIN — Medication 125 MILLIGRAM(S): at 10:56

## 2021-01-01 RX ADMIN — HEPARIN SODIUM 5000 UNIT(S): 5000 INJECTION INTRAVENOUS; SUBCUTANEOUS at 17:24

## 2021-01-01 RX ADMIN — HEPARIN SODIUM 5000 UNIT(S): 5000 INJECTION INTRAVENOUS; SUBCUTANEOUS at 05:35

## 2021-01-01 RX ADMIN — PIPERACILLIN AND TAZOBACTAM 25 GRAM(S): 4; .5 INJECTION, POWDER, LYOPHILIZED, FOR SOLUTION INTRAVENOUS at 14:36

## 2021-01-01 RX ADMIN — AMLODIPINE BESYLATE 5 MILLIGRAM(S): 2.5 TABLET ORAL at 05:42

## 2021-01-01 RX ADMIN — HEPARIN SODIUM 5000 UNIT(S): 5000 INJECTION INTRAVENOUS; SUBCUTANEOUS at 14:37

## 2021-01-01 RX ADMIN — SODIUM CHLORIDE 60 MILLILITER(S): 9 INJECTION, SOLUTION INTRAVENOUS at 16:57

## 2021-01-01 RX ADMIN — Medication 650 MILLIGRAM(S): at 23:27

## 2021-01-01 RX ADMIN — HEPARIN SODIUM 5000 UNIT(S): 5000 INJECTION INTRAVENOUS; SUBCUTANEOUS at 21:31

## 2021-01-01 RX ADMIN — HEPARIN SODIUM 5000 UNIT(S): 5000 INJECTION INTRAVENOUS; SUBCUTANEOUS at 05:15

## 2021-01-01 RX ADMIN — SODIUM CHLORIDE 100 MILLILITER(S): 9 INJECTION, SOLUTION INTRAVENOUS at 15:42

## 2021-01-01 RX ADMIN — ATORVASTATIN CALCIUM 20 MILLIGRAM(S): 80 TABLET, FILM COATED ORAL at 21:03

## 2021-01-01 RX ADMIN — Medication 650 MILLIGRAM(S): at 02:41

## 2021-01-01 RX ADMIN — Medication 12.5 MILLIGRAM(S): at 17:33

## 2021-01-01 RX ADMIN — SODIUM CHLORIDE 80 MILLILITER(S): 9 INJECTION, SOLUTION INTRAVENOUS at 13:57

## 2021-01-01 RX ADMIN — Medication 650 MILLIGRAM(S): at 07:57

## 2021-01-01 RX ADMIN — MORPHINE SULFATE 0.5 MILLIGRAM(S): 50 CAPSULE, EXTENDED RELEASE ORAL at 17:10

## 2021-01-01 RX ADMIN — Medication 81 MILLIGRAM(S): at 11:46

## 2021-01-01 RX ADMIN — Medication 25 MILLIGRAM(S): at 05:59

## 2021-01-01 RX ADMIN — AMLODIPINE BESYLATE 2.5 MILLIGRAM(S): 2.5 TABLET ORAL at 05:45

## 2021-01-01 RX ADMIN — HEPARIN SODIUM 5000 UNIT(S): 5000 INJECTION INTRAVENOUS; SUBCUTANEOUS at 22:46

## 2021-01-01 RX ADMIN — MORPHINE SULFATE 1 MILLIGRAM(S): 50 CAPSULE, EXTENDED RELEASE ORAL at 06:14

## 2021-01-01 RX ADMIN — HEPARIN SODIUM 5000 UNIT(S): 5000 INJECTION INTRAVENOUS; SUBCUTANEOUS at 15:15

## 2021-01-01 RX ADMIN — PIPERACILLIN AND TAZOBACTAM 25 GRAM(S): 4; .5 INJECTION, POWDER, LYOPHILIZED, FOR SOLUTION INTRAVENOUS at 05:37

## 2021-01-01 RX ADMIN — ATORVASTATIN CALCIUM 20 MILLIGRAM(S): 80 TABLET, FILM COATED ORAL at 21:31

## 2021-01-01 RX ADMIN — SODIUM CHLORIDE 60 MILLILITER(S): 9 INJECTION, SOLUTION INTRAVENOUS at 05:58

## 2021-01-01 RX ADMIN — HEPARIN SODIUM 5000 UNIT(S): 5000 INJECTION INTRAVENOUS; SUBCUTANEOUS at 17:29

## 2021-01-01 RX ADMIN — PIPERACILLIN AND TAZOBACTAM 25 GRAM(S): 4; .5 INJECTION, POWDER, LYOPHILIZED, FOR SOLUTION INTRAVENOUS at 23:20

## 2021-01-01 RX ADMIN — Medication 20 MILLIGRAM(S): at 06:04

## 2021-01-01 RX ADMIN — MORPHINE SULFATE 1 MILLIGRAM(S): 50 CAPSULE, EXTENDED RELEASE ORAL at 11:44

## 2021-01-01 RX ADMIN — Medication 81 MILLIGRAM(S): at 13:51

## 2021-01-01 RX ADMIN — SODIUM CHLORIDE 100 MILLILITER(S): 9 INJECTION, SOLUTION INTRAVENOUS at 11:24

## 2021-01-01 RX ADMIN — PROPOFOL 4.36 MICROGRAM(S)/KG/MIN: 10 INJECTION, EMULSION INTRAVENOUS at 22:22

## 2021-01-01 RX ADMIN — HEPARIN SODIUM 5000 UNIT(S): 5000 INJECTION INTRAVENOUS; SUBCUTANEOUS at 05:38

## 2021-01-01 RX ADMIN — HEPARIN SODIUM 5000 UNIT(S): 5000 INJECTION INTRAVENOUS; SUBCUTANEOUS at 17:23

## 2021-01-01 RX ADMIN — REMDESIVIR 500 MILLIGRAM(S): 5 INJECTION INTRAVENOUS at 17:07

## 2021-01-01 RX ADMIN — Medication 25 MILLIGRAM(S): at 06:00

## 2021-01-01 RX ADMIN — Medication 6 MILLIGRAM(S): at 05:09

## 2021-01-01 RX ADMIN — HEPARIN SODIUM 5000 UNIT(S): 5000 INJECTION INTRAVENOUS; SUBCUTANEOUS at 22:18

## 2021-01-01 RX ADMIN — AMLODIPINE BESYLATE 2.5 MILLIGRAM(S): 2.5 TABLET ORAL at 07:09

## 2021-01-01 RX ADMIN — PIPERACILLIN AND TAZOBACTAM 25 GRAM(S): 4; .5 INJECTION, POWDER, LYOPHILIZED, FOR SOLUTION INTRAVENOUS at 18:08

## 2021-01-01 RX ADMIN — Medication 1 PATCH: at 08:06

## 2021-01-01 RX ADMIN — Medication 650 MILLIGRAM(S): at 22:46

## 2021-01-01 RX ADMIN — SODIUM CHLORIDE 80 MILLILITER(S): 9 INJECTION, SOLUTION INTRAVENOUS at 05:46

## 2021-01-01 RX ADMIN — SCOPALAMINE 1 PATCH: 1 PATCH, EXTENDED RELEASE TRANSDERMAL at 17:40

## 2021-01-01 RX ADMIN — AMLODIPINE BESYLATE 2.5 MILLIGRAM(S): 2.5 TABLET ORAL at 06:00

## 2021-01-01 RX ADMIN — ATORVASTATIN CALCIUM 20 MILLIGRAM(S): 80 TABLET, FILM COATED ORAL at 21:14

## 2021-01-01 RX ADMIN — PANTOPRAZOLE SODIUM 40 MILLIGRAM(S): 20 TABLET, DELAYED RELEASE ORAL at 11:38

## 2021-01-01 RX ADMIN — PIPERACILLIN AND TAZOBACTAM 25 GRAM(S): 4; .5 INJECTION, POWDER, LYOPHILIZED, FOR SOLUTION INTRAVENOUS at 05:17

## 2021-01-01 RX ADMIN — HEPARIN SODIUM 5000 UNIT(S): 5000 INJECTION INTRAVENOUS; SUBCUTANEOUS at 05:37

## 2021-01-01 RX ADMIN — Medication 125 MILLIGRAM(S): at 12:21

## 2021-01-01 RX ADMIN — MORPHINE SULFATE 1 MILLIGRAM(S): 50 CAPSULE, EXTENDED RELEASE ORAL at 12:04

## 2021-01-01 RX ADMIN — AMLODIPINE BESYLATE 2.5 MILLIGRAM(S): 2.5 TABLET ORAL at 06:44

## 2021-01-01 RX ADMIN — Medication 20 MILLIGRAM(S): at 16:30

## 2021-01-01 RX ADMIN — HEPARIN SODIUM 5000 UNIT(S): 5000 INJECTION INTRAVENOUS; SUBCUTANEOUS at 13:58

## 2021-01-01 RX ADMIN — MORPHINE SULFATE 1 MILLIGRAM(S): 50 CAPSULE, EXTENDED RELEASE ORAL at 00:57

## 2021-01-01 RX ADMIN — AMLODIPINE BESYLATE 2.5 MILLIGRAM(S): 2.5 TABLET ORAL at 05:57

## 2021-01-01 RX ADMIN — MORPHINE SULFATE 0.5 MILLIGRAM(S): 50 CAPSULE, EXTENDED RELEASE ORAL at 00:56

## 2021-01-01 RX ADMIN — HEPARIN SODIUM 5000 UNIT(S): 5000 INJECTION INTRAVENOUS; SUBCUTANEOUS at 18:02

## 2021-01-01 RX ADMIN — AMLODIPINE BESYLATE 2.5 MILLIGRAM(S): 2.5 TABLET ORAL at 07:05

## 2021-01-01 RX ADMIN — MORPHINE SULFATE 0.5 MILLIGRAM(S): 50 CAPSULE, EXTENDED RELEASE ORAL at 01:06

## 2021-01-01 RX ADMIN — Medication 20 MILLIGRAM(S): at 11:36

## 2021-01-01 RX ADMIN — MORPHINE SULFATE 0.5 MILLIGRAM(S): 50 CAPSULE, EXTENDED RELEASE ORAL at 11:41

## 2021-01-01 RX ADMIN — MORPHINE SULFATE 1 MILLIGRAM(S): 50 CAPSULE, EXTENDED RELEASE ORAL at 18:08

## 2021-01-01 RX ADMIN — HEPARIN SODIUM 5000 UNIT(S): 5000 INJECTION INTRAVENOUS; SUBCUTANEOUS at 22:10

## 2021-01-01 RX ADMIN — Medication 650 MILLIGRAM(S): at 00:16

## 2021-01-01 RX ADMIN — AMLODIPINE BESYLATE 5 MILLIGRAM(S): 2.5 TABLET ORAL at 16:34

## 2021-01-01 RX ADMIN — PIPERACILLIN AND TAZOBACTAM 25 GRAM(S): 4; .5 INJECTION, POWDER, LYOPHILIZED, FOR SOLUTION INTRAVENOUS at 05:35

## 2021-01-01 RX ADMIN — Medication 125 MILLIGRAM(S): at 11:02

## 2021-01-01 RX ADMIN — PIPERACILLIN AND TAZOBACTAM 25 GRAM(S): 4; .5 INJECTION, POWDER, LYOPHILIZED, FOR SOLUTION INTRAVENOUS at 17:57

## 2021-01-01 RX ADMIN — Medication 1 PATCH: at 19:44

## 2021-01-01 RX ADMIN — Medication 6 MILLIGRAM(S): at 16:03

## 2021-01-01 RX ADMIN — Medication 20 MILLIGRAM(S): at 05:23

## 2021-01-01 RX ADMIN — PIPERACILLIN AND TAZOBACTAM 25 GRAM(S): 4; .5 INJECTION, POWDER, LYOPHILIZED, FOR SOLUTION INTRAVENOUS at 05:15

## 2021-01-01 RX ADMIN — HEPARIN SODIUM 5000 UNIT(S): 5000 INJECTION INTRAVENOUS; SUBCUTANEOUS at 06:44

## 2021-01-01 RX ADMIN — Medication 6 MILLIGRAM(S): at 05:53

## 2021-01-01 RX ADMIN — ENOXAPARIN SODIUM 40 MILLIGRAM(S): 100 INJECTION SUBCUTANEOUS at 05:36

## 2021-01-01 RX ADMIN — ATORVASTATIN CALCIUM 20 MILLIGRAM(S): 80 TABLET, FILM COATED ORAL at 22:25

## 2021-01-01 RX ADMIN — Medication 20 MILLIGRAM(S): at 05:17

## 2021-01-01 RX ADMIN — SODIUM CHLORIDE 500 MILLILITER(S): 9 INJECTION, SOLUTION INTRAVENOUS at 21:35

## 2021-01-01 RX ADMIN — Medication 650 MILLIGRAM(S): at 05:39

## 2021-04-19 NOTE — ED PROVIDER NOTE - CLINICAL SUMMARY MEDICAL DECISION MAKING FREE TEXT BOX
patinet lethargic and vomiting on ED presentaiton, hypoxic and non protecting airway; intubated for airway protection and oxygenation. EKG with inferolateral TWI, IVF initially witheld as possbile CHF exacerbation; however unlikely acute CHF exacerbation as BNP not significantly elvated and patinet not clnically fluid overloaded, possible aspiration from vomiting this AM; CT with possible cystitis; UA pending; patient empirtically treated for sepsis; NGT advanced in response to CT read of NGT in esophagus. code status reviewed with family; d/w ICU for critical care need of patient with respiratory failure; prerenal DONITA present IVF administered

## 2021-04-19 NOTE — ED ADULT TRIAGE NOTE - CHIEF COMPLAINT QUOTE
as per emt, pt started vomiting after eating breakfast, hypoxic on scene. pt received with cpap in place, vomiting in triage x 2, cpap removed. nitro x 1 given by emt. a&o x 3 at baseline.

## 2021-04-19 NOTE — H&P ADULT - ATTENDING COMMENTS
89 F with history above presents with severe sepsis without shock and acute hypoxemic respiratory failure requiring intubation and mechanical ventilation. Suspect aspiration pneumonia. Will cover empirically with Zosyn. Initially hypertensive but BP starting to fall. Will bolus 1-2 L LR. May require vasopressors if does not respond. Continue lung protective ventilation and supportive care. Remainder of plan as above. Prognosis guarded. 89 F with history above presents with severe sepsis without shock and acute hypoxemic respiratory failure requiring intubation and mechanical ventilation. Suspect aspiration pneumonia vs UTI / cystitis. Will cover empirically with Zosyn. Initially hypertensive but BP starting to fall. Will bolus 1-2 L LR. May require vasopressors if does not respond. Continue lung protective ventilation and supportive care. Remainder of plan as above. Prognosis guarded.

## 2021-04-19 NOTE — ED PROVIDER NOTE - CRITICAL CARE ATTENDING CONTRIBUTION TO CARE
patinet lethargic and vomiting on ED presentaiton, hypoxic and non protecting airway; intubated for airway protection and oxygenation. EKG with inferolateral TWI, IVF initially witheld as possbile CHF exacerbation; however unlikely acute CHF exacerbation as BNP not significantly elvated and patinet not clnically fluid overloaded, possible aspiration from vomiting this AM; CT with possible cystitis; UA pending; patient empirtically treated for sepsis; NGT advanced in response to CT read of NGT in esophagus. code status reviewed with family; d/w ICU for critical care need of patient with respiratory failure; prerenal DONITA present IVF administered. repeat ABG with persistnet mild respiraotry acidosis, RR increased from 14 to 18

## 2021-04-19 NOTE — H&P ADULT - HISTORY OF PRESENT ILLNESS
Patient is a 89yo F with PMHx CAD s/p CABG, HTN, HLD, Parkinsons. History collected from daughter. Patient was eating breakfast this morning at the table when she developed some choking and respiratory distress. Patients daughter called EMS. In the ED, noted to have O2 saturation in the 80s. Patient started on BIPAP for support. Patient then developed vomiting while on BIPAP. Patient with worsening mental status at the time and given likely aspiration was intubated for airway protection. ICU consulted for respiratory failure and airway management.

## 2021-04-19 NOTE — ED PROVIDER NOTE - NEUROLOGICAL, MLM
awake, minimally responsive, no focal deficits, no focal motor or sensory deficits. dec. movement of all extremities, responsive to stimulus equally in all 4 extremities

## 2021-04-19 NOTE — ED ADULT NURSE NOTE - OBJECTIVE STATEMENT
89 Y/O female with PMH of HLD, CABG, Parkinson. BIBA in respiratory distress. Pt was eating breakfast this morning and began vomiting. pt in AMS, & given 1 nitroglycerine po sublingual.

## 2021-04-19 NOTE — ED ADULT NURSE NOTE - NSIMPLEMENTINTERV_GEN_ALL_ED
Implemented All Fall with Harm Risk Interventions:  Bard to call system. Call bell, personal items and telephone within reach. Instruct patient to call for assistance. Room bathroom lighting operational. Non-slip footwear when patient is off stretcher. Physically safe environment: no spills, clutter or unnecessary equipment. Stretcher in lowest position, wheels locked, appropriate side rails in place. Provide visual cue, wrist band, yellow gown, etc. Monitor gait and stability. Monitor for mental status changes and reorient to person, place, and time. Review medications for side effects contributing to fall risk. Reinforce activity limits and safety measures with patient and family. Provide visual clues: red socks.

## 2021-04-19 NOTE — H&P ADULT - ASSESSMENT
89yo F with PMHx CAD s/p CABG, HTN, HLD, Parkinsons who presents to the ED with respiratory failure after choking on her breakfast this morning. Patient started on BIPAP in the ED and developed episode of vomiting which caused worsening respiratory status and lethargy. Patient intubated in the ED for worsening respiratory failure and airway protection. Patient seen and examined at the bedside. Will admit to the ICU for further airway management.     Neuro:  -- CTH negative for pathology. Sedated on Fentanyl  -- Titrate to vent synchrony  -- Sedation vacation for SBT QAM  -- Neuro checks per ICU protocol     Cardio:  -- Hypertensive in the ED /85  -- Re-start anti-hypertensives with HOLD parameters  -- Maintain MAP >65    Resp:  -- Respiratory failure secondary to Aspiration PNA   -- Intubated. PRVC 14/450/60/5  -- Titrate vent settings as tolerated and per ABG  -- SBT QAM    GI:   -- No acute issues at this time  -- Will place NGT for tube feedings  -- PPI    Renal:  -- DONITA on admission, baseline renal function unknown  -- Maddox placement. Strict I/Os. Trend renal indices    Infectious Disease:  -- COVID negative  -- Concern for Aspiration PNA. Cover with Zosyn for now   -- Follow up UA, cultures    Heme:  -- SQH DVT ppx     Endocrine:  -- FS Q6    Disposition:  -- Admit to the ICU   -- Discussed case with daughter Megan, will remain full code for now  -- Continue to address [facility]  89yo F with PMHx CAD s/p CABG, HTN, HLD, Parkinsons who presents to the ED with respiratory failure after choking on her breakfast this morning. Patient started on BIPAP in the ED and developed episode of vomiting which caused worsening respiratory status and lethargy. Patient intubated in the ED for worsening respiratory failure and airway protection. Patient seen and examined at the bedside. Will admit to the ICU for further airway management.     Neuro:  -- CTH negative for pathology. Sedated on Fentanyl, Propofol  -- Titrate to vent synchrony  -- Sedation vacation for SBT QAM  -- Neuro checks per ICU protocol     Cardio:  -- Hypertensive in the ED /85  -- Re-start anti-hypertensives with HOLD parameters  -- Maintain MAP >65    Resp:  -- Respiratory failure secondary to Aspiration PNA   -- Intubated. PRVC 14/450/60/5  -- Titrate vent settings as tolerated and per ABG  -- SBT QAM    GI:   -- No acute issues at this time  -- Will place NGT for tube feedings  -- PPI    Renal:  -- DONITA on admission, baseline renal function unknown  -- Maddox placement. Strict I/Os. Trend renal indices    Infectious Disease:  -- COVID negative  -- Concern for Aspiration PNA. Cover with Zosyn for now   -- Follow up UA, cultures    Heme:  -- SQH DVT ppx     Endocrine:  -- FS Q6    Disposition:  -- Admit to the ICU   -- Discussed case with daughter Megan, will remain full code for now  -- Continue to address [facility]

## 2021-04-19 NOTE — ED ADULT TRIAGE NOTE - CCCP TRG CHIEF CMPLNT
OT ACUTE Treatment Session    Pt seen on 3  nursing unit.                                                          Frequency Comments: MW     Admitting complaint:: SBO (small bowel obstruction) [K56.609]                                                                                         Precautions  Other Precautions: abdominal precautions (01/06/18 0950)      ASSESSMENT:  Treatment today focused on progressing patient level of ability to supervision with lower body ADLs, safe reaching from chair, unilateral hand on arm rests to reach to lower legs, completed lower body threading/donning of garments from recliner chair, assist to tighten and tie hospital pants in standing. Patient participated in ambulation to/from patient bathroom, transfers, toilet transfer demonstrating overall supervision, fatigue/weakness. Will further address overall tolerance needed for ADLs and related functional mobility to maintain patient highest level of self participation dealing with current medical acuity. Plan is for home hospice, lives with sister.   Current overall ADL status is supervision/set up  Current functional mobility for ADL and Instrumental-ADL tasks is supervision.  Patient  displays  fair progress demonstrated by supervision level of ADLs and related functional mobility.    Limitations at this time include weakness, fatigue and medical status. Patient will benefit from further skilled OT for continued training with above to help the patient meet goal of ability for self completion of ADLs and related functional mobility, address if any, home hospice adaptive equipment needs.  Discussed patient goal of returning home with sister, plan for home hospice, discharge planning options and therapy progress made to date with Patient.       RECOMMENDATIONS FOR DISCHARGE:  Recommendations for Discharge: OT: Other (comment) (home hospice planned, lives with sister ) (01/08/18 7071)    OT Identified Barriers to Discharge:  medical status, weakness, fatigue      PT/OT Mobility Equipment for Discharge: continue to assess (will need 2ww if doesn't own) (01/05/18 1429)  PT/OT ADL Equipment for Discharge: continue to assess (01/08/18 1501)    ICU Mobility Assesment (PERME):         PLAN: Continue skilled OT, including the following Treatment Interventions: ADL retraining;Functional transfer training;Endurance training;Patient/Family training;Equipment eval/education;Compensatory technique education (01/08/18 1501)   Treatment Plan for Next Session: home hospice D/C needs  Additional Plan Considerations: bathing sitting/standing as appropriate  Plan Comments: assess needs for shower chair     EDUCATION:   On this date, the patient was educated on tolerance, safety, strengthening participating in ADLs and related functional mobility.    The response to education was: Needs reinforcement                                                    SUBJECTIVE:     Subjective: Patient agrees to session.  (01/08/18 1501)  Subjective/Objective Comments: Patient supine at start, in recliner chair at end of session.  (01/08/18 1501)    OBJECTIVE:Basic Lines: IV (01/08/18 1501)  Complex Lines: J.P. drain (01/08/18 1501)  Safety Measures: Alarms;Bed rails;Other (comment) (chair alarm, call light, in recliner end of session need met) (01/08/18 1501)    RN reported Ramírez Fall Scale Score: 45       Last 24 hours of Functional Data     ADLs  Self Cares/ADL's  Grooming Assistance: Supervision;Set-up;Chair (01/08/18 1501)  Grooming/Oral Hygiene Deficit: Wash/dry hands;Wash/dry face (01/08/18 1501)  Upper Body Dressing Assistance: Minimal Assist (Min) (01/08/18 1501)  Upper Body Dressing Deficit: Thread RUE;Thread LUE;Pull around back;Fasteners;Other (comment) (hospital gown and gown as robe ) (01/08/18 1501)  Lower Body Clothing Assistance: Minimal Assist (Min);Other (comment) (tieing hospital pants ) (01/08/18 1501)  Footwear Assistance: Supervision (01/08/18  1501)  Lower Body Dressing Deficit: Thread RLE into pants;Thread LLE into pants;Pull up over hips;Don/doff R sock;Don/doff L sock;Fasteners;Other (comment) (assist tighten hospital pants,demonstrates able rick socks) (01/08/18 1501)  Toileting Assistance: Minimal Assist (Min);Other (comment) (tieing hospital pants ) (01/08/18 1501)  Toileting Deficit: Clothing management down;Clothing management up;Perineal hygiene (01/08/18 1501)  Toileting Equipment Used: Grab bar use (01/08/18 1501)  Self Cares/ADL's Comments #1: Patient demonstrates overall supervision for ADLs and related functional mobility, will receive assist from sister at home, hospice planned  (01/08/18 1501)    Household mobility  Household Mobility  Supine to Sit: Supervision (01/08/18 1501)  Sit to Stand: Supervision (01/08/18 1501)  Stand to Sit: Supervision (01/08/18 1501)  Toilet Transfers: Supervision (01/08/18 1501)  Transfer Equipment: gait belt, 2 ww  (01/08/18 1501)  Sitting - Static: Supervision (01/08/18 1501)  Sitting - Dynamic: Supervision (LB dressing, reaching ) (01/08/18 1501)  Standing - Static: Supervision (01/08/18 1501)  Standing - Dynamic: Supervision (LB dressing/reaching ) (01/08/18 1501)    Home Management       Tolerance  OT Activity Tolerance  Activity Tolerance: 1:1 Activity to rest (01/08/18 1501)  Activity Tolerance Comments: fair for medical acuity  (01/08/18 1501)    Cognition       Patient's Personal Goal: none stated (01/05/18 1044)    Therapy Goals:    Goals  Short Term Goals to Be Reviewed On: 01/15/18 (01/08/18 1501)  Short Term Goals Are The Same as Discharge Goals: Yes (01/08/18 1501)  Goal Agreement: Patient agrees with goals and treatment plan (01/08/18 1501)  Grooming Discharge Goal 1: supv for grooming tasks (01/08/18 1501)  Grooming Discharge Goal Progress 1: Outcome not met, continue to monitor (01/08/18 1501)  Bathing Discharge Goal 1: supv for all bathing tasks  (01/08/18 1501)  Bathing Discharge Goal  Progress 1: Outcome not met, continue to monitor (01/08/18 1501)  Dressing Discharge Goal 1: supv for all dressing tasks  (01/08/18 1501)  Dressing Discharge Goal Progress 1: Outcome not met, continue to monitor (01/08/18 1501)  Toileting Short Term Goal 1: supv for all toileting tasks  (01/08/18 1501)  Toileting Discharge Goal Progress 1: Outcome not met, continue to monitor (01/08/18 1501)  Goal Comments: Pt will go home hospice (01/08/18 1501)        Total Treatment Time:  OT Time Spent: 30 minutes (01/08/18 1501)    See OT flowsheet for full details regarding the OT therapy provided.      shortness of breath hypoxia

## 2021-04-19 NOTE — H&P ADULT - NSICDXPASTMEDICALHX_GEN_ALL_CORE_FT
PAST MEDICAL HISTORY:  CAD (coronary artery disease) s/p CABG, PPM    HLD (hyperlipidemia)     HTN (hypertension)

## 2021-04-19 NOTE — ED PROVIDER NOTE - OBJECTIVE STATEMENT
89f pmhx CAD s/p CABG, htn, hld parkinsons. this AM was witnessed to have vomiting while eating breakfast, subsequently develoepd respiratpry distress, O2 saturation in high 70's per EMS, placed on CPAP with repetitive episodes of vomiting. patinet normally A&OX3, since vomiting has been more altered than her baseline. no recent illness

## 2021-04-20 NOTE — PHARMACOTHERAPY INTERVENTION NOTE - COMMENTS
Recommended dose adjustment of atorvastatin to 20mg daily which is therapeutically equivalent to patient's home regimen of 80 mg pravastatin.

## 2021-04-20 NOTE — DIETITIAN INITIAL EVALUATION ADULT. - OTHER CALCULATIONS
IBW used for calculation of estimated needs. %IBW: ~125%           % UBW: 100%, wt. gain of 1.3 kg since adm noted

## 2021-04-20 NOTE — DIETITIAN INITIAL EVALUATION ADULT. - ORAL INTAKE PTA/DIET HISTORY
Pt is a poor historian, was not able to state date of birth.  Pt's daughter was @ bedside, stated that she hasn't seen her mother due to COVID lockdown.  Pt lived in Assisted Living Facility PTA.  Pt was on regular diet PTA as per transfer records.  As per daughter, pt is missing her partial dentures.  RN is looking into where dentures may have been misplaced. Pt is a poor historian, was not able to state date of birth.  Pt's daughter was @ bedside, stated that she hasn't seen her mother due to COVID lockdown.  Pt lived in Assisted Living Facility PTA.  Pt was on regular diet PTA as per transfer records, pt does not eat pork as per daughter.  As per daughter, pt is missing her partial dentures.  RN is looking into where dentures may have been misplaced.

## 2021-04-20 NOTE — DIETITIAN INITIAL EVALUATION ADULT. - PHYSCIAL ASSESSMENT
BMI=25.5(04/19), 04/19->04/20, 1+ generalized edema & 2 + edema of hands, feet, ankles noted/overweight/debilitated/other (specify) limited exam due to debility, edema

## 2021-04-20 NOTE — PROGRESS NOTE ADULT - SUBJECTIVE AND OBJECTIVE BOX
Interval Events:  On minimal vent settings this morning  Extubated following rounds    REVIEW OF SYSTEMS:  [ ] All other systems negative  [x] Unable to assess ROS due to poor mentation    OBJECTIVE:  ICU Vital Signs Last 24 Hrs  T(C): 36.3 (2021 12:00), Max: 36.8 (2021 22:00)  T(F): 97.3 (2021 12:00), Max: 98.3 (2021 22:00)  HR: 73 (2021 12:30) (60 - 97)  BP: 96/58 (2021 12:30) (80/40 - 166/45)  BP(mean): 68 (2021 12:30) (50 - 108)  ABP: --  ABP(mean): --  RR: 26 (2021 12:30) (14 - 31)  SpO2: 98% (2021 12:30) (91% - 100%)    Mode: CPAP with PS, FiO2: 40, PEEP: 5, PS: 5, ITime: 1, MAP: 6, PIP: 13     @ 07: @ 07:00  --------------------------------------------------------  IN: 4706.3 mL / OUT: 1105 mL / NET: 3601.3 mL     @ 07: @ 13:03  --------------------------------------------------------  IN: 541.4 mL / OUT: 380 mL / NET: 161.4 mL      CAPILLARY BLOOD GLUCOSE      POCT Blood Glucose.: 132 mg/dL (2021 11:35)      PHYSICAL EXAM:  General: NAD  HEENT: NC/AT  Neck: Supple  Respiratory: Bibasilar crackles  Cardiovascular: RRR, no edema  Abdomen: Soft, nontender, nondistended  Extremities: Warm  Skin: Intact  Neurological: A&Ox0. Slightly more alert following extubation.    HOSPITAL MEDICATIONS:  aspirin  chewable 81 milliGRAM(s) Oral daily  heparin   Injectable 5000 Unit(s) SubCutaneous every 12 hours    piperacillin/tazobactam IVPB.. 3.375 Gram(s) IV Intermittent every 12 hours    norepinephrine Infusion 0.05 MICROgram(s)/kG/Min IV Continuous <Continuous>    atorvastatin 20 milliGRAM(s) Oral at bedtime      fentaNYL   Infusion. 0.5 MICROgram(s)/kG/Hr IV Continuous <Continuous>  propofol Infusion 10 MICROgram(s)/kG/Min IV Continuous <Continuous>    pantoprazole  Injectable 40 milliGRAM(s) IV Push daily        lactated ringers. 1000 milliLiter(s) IV Continuous <Continuous>      chlorhexidine 2% Cloths 1 Application(s) Topical <User Schedule>        LABS:                        10.7   10.06 )-----------( 214      ( 2021 03:32 )             33.5     Hgb Trend: 10.7<--, 11.7<--, 11.4<--, 12.4<--  04-20    139  |  108  |  40<H>  ----------------------------<  110<H>  4.5   |  22  |  1.94<H>    Ca    7.9<L>      2021 03:32  Phos  2.9     04-20  Mg     2.1     04-20    TPro  5.4<L>  /  Alb  2.5<L>  /  TBili  0.7  /  DBili  x   /  AST  38<H>  /  ALT  54  /  AlkPhos  76  04-20    Creatinine Trend: 1.94<--, 2.25<--, 2.12<--, 2.51<--  PT/INR - ( 2021 10:22 )   PT: 12.6 sec;   INR: 1.09 ratio         PTT - ( 2021 10:22 )  PTT:26.3 sec  Urinalysis Basic - ( 2021 12:57 )    Color: Yellow / Appearance: Clear / S.010 / pH: x  Gluc: x / Ketone: Negative  / Bili: Negative / Urobili: Negative mg/dL   Blood: x / Protein: 30 mg/dL / Nitrite: Positive   Leuk Esterase: Moderate / RBC: 3-5 /HPF / WBC 6-10   Sq Epi: x / Non Sq Epi: Occasional / Bacteria: Few      Arterial Blood Gas:   @ 08:59  --/////-0.6  ABG lactate: --  Arterial Blood Gas:   @ 12:22  --/49/94//-4.0  ABG lactate: --  Arterial Blood Gas:   @ 10:39  --//268///-2.8  ABG lactate: --

## 2021-04-20 NOTE — PROGRESS NOTE ADULT - ASSESSMENT
Acute respiratory failure  s/p aspiration  FiO2 down to 40%  BP's ok on low dose norepinephrine    Echo shows normal LV function, mild to moderate AI ( msec) similar to prior  should be able to d/c pressors    SCr down but lactate higher  Repeat CXR pending  No evidence of heart failure or ACS  supportive care as currently employed    check PPM

## 2021-04-20 NOTE — DIETITIAN INITIAL EVALUATION ADULT. - PERTINENT MEDS FT
MEDICATIONS  (STANDING):  aspirin  chewable 81 milliGRAM(s) Oral daily  atorvastatin 20 milliGRAM(s) Oral at bedtime  chlorhexidine 2% Cloths 1 Application(s) Topical <User Schedule>  fentaNYL   Infusion. 0.5 MICROgram(s)/kG/Hr (3.63 mL/Hr) IV Continuous <Continuous>  furosemide   Injectable 20 milliGRAM(s) IV Push once  heparin   Injectable 5000 Unit(s) SubCutaneous every 12 hours  lactated ringers. 1000 milliLiter(s) (100 mL/Hr) IV Continuous <Continuous>  norepinephrine Infusion 0.05 MICROgram(s)/kG/Min (6.13 mL/Hr) IV Continuous <Continuous>  pantoprazole  Injectable 40 milliGRAM(s) IV Push daily  piperacillin/tazobactam IVPB.. 3.375 Gram(s) IV Intermittent every 12 hours  propofol Infusion 10 MICROgram(s)/kG/Min (4.36 mL/Hr) IV Continuous <Continuous>    MEDICATIONS  (PRN):

## 2021-04-20 NOTE — PROGRESS NOTE ADULT - ASSESSMENT
89 F with CAD s/p CABG, HTN, HLD and Parkinson's disease presents with severe sepsis without shock and acute hypoxemic respiratory failure requiring intubation and mechanical ventilation. Suspect aspiration pneumonia vs UTI / cystitis. On empiric with Zosyn.    NEURO: More alert following extubation but not responding to questions or commands. Off all sedatives.  CVS: HD off vasopressors. Briefly required overnight for vasoplegia likely due to sedation.  PULM: Extubated this morning. Respiratory status stable.  GI: NPO for now given aspiration risk.  RENAL: DONITA improved with IVF. Unknown baseline Cr.  ENDO: Monitor glucose levels  ID: Empiric Zosyn. Follow up cultures.  PPX: HSQ

## 2021-04-20 NOTE — CHART NOTE - NSCHARTNOTEFT_GEN_A_CORE
PPM checked ([facility])  No reportable events   [facility] Rep discussed report with Dr. Horta  Full report in chart

## 2021-04-20 NOTE — DIETITIAN INITIAL EVALUATION ADULT. - PERTINENT LABORATORY DATA
04-20 Na139 mmol/L Glu 110 mg/dL<H> K+ 4.5 mmol/L Cr  1.94 mg/dL<H> BUN 40 mg/dL<H> 04-20 Phos 2.9 mg/dL 04-20 Alb 2.5 g/dL<L>04-20 ALT 54 U/L AST 38 U/L<H> Alkaline Phosphatase 76 U/L  04/19, glucose 713 mg/dL<H>(15:01)?, 04/19, Glucose 184 mg/dL <H> (10:22)

## 2021-04-20 NOTE — PROGRESS NOTE ADULT - SUBJECTIVE AND OBJECTIVE BOX
INTERVAL HISTORY:  remains in CCU, intubated, sedated with propofol, Fentanyl  agitated when sedation lighthedend    PAST MEDICAL & SURGICAL HISTORY:  CAD (coronary artery disease)  s/p CABG, PPM    HTN (hypertension)    HLD (hyperlipidemia)    Artificial pacemaker          MEDICATIONS:  MEDICATIONS  (STANDING):  aspirin  chewable 81 milliGRAM(s) Oral daily  atorvastatin 80 milliGRAM(s) Oral at bedtime  cefTRIAXone   IVPB 1000 milliGRAM(s) IV Intermittent every 24 hours  chlorhexidine 0.12% Liquid 15 milliLiter(s) Oral Mucosa every 12 hours  chlorhexidine 2% Cloths 1 Application(s) Topical <User Schedule>  fentaNYL   Infusion. 0.5 MICROgram(s)/kG/Hr (3.63 mL/Hr) IV Continuous <Continuous>  heparin   Injectable 5000 Unit(s) SubCutaneous every 12 hours  lactated ringers. 1000 milliLiter(s) (100 mL/Hr) IV Continuous <Continuous>  norepinephrine Infusion 0.05 MICROgram(s)/kG/Min (6.13 mL/Hr) IV Continuous <Continuous>  pantoprazole  Injectable 40 milliGRAM(s) IV Push daily  piperacillin/tazobactam IVPB.. 3.375 Gram(s) IV Intermittent every 12 hours  propofol Infusion 10 MICROgram(s)/kG/Min (4.36 mL/Hr) IV Continuous <Continuous>    MEDICATIONS  (PRN):      PHYSICAL EXAM:  T(F): 98.3 (04-20-21 @ 02:30), Max: 98.4 (04-19-21 @ 09:51)  HR: 64 (04-20-21 @ 08:00) (60 - 112)  BP: 166/45 (04-20-21 @ 07:30) (80/40 - 198/89)  RR: 16 (04-20-21 @ 08:00) (14 - 114)  SpO2: 100% (04-20-21 @ 08:00) (88% - 100%)  Wt(kg): --  I&O's Summary    19 Apr 2021 07:01  -  20 Apr 2021 07:00  --------------------------------------------------------  IN: 4706.3 mL / OUT: 1030 mL / NET: 3676.3 mL      Height (cm): 165.1 (04-19 @ 13:30)  Weight (kg): 65.4 (04-19 @ 13:30)  BMI (kg/m2): 24 (04-19 @ 13:30)  BSA (m2): 1.72 (04-19 @ 13:30)    PHYSICAL EXAM:    HEENT: sclera anicteric, conjunctiva normal  JVP normal  Carotids: normal upstroke  Lungs:  good bs bilaterally  Cor: normal S1S2, no S3, rub  Abdomen:  normal bs, nondistended, no organomegaly  Ext:  no edema       OTHER: 	    LABS:	     CARDIAC MARKERS:  Troponin I, Serum: <.015 ng/mL (04-19 @ 10:22)                                  10.7   10.06 )-----------( 214      ( 20 Apr 2021 03:32 )             33.5     04-20    139  |  108  |  40<H>  ----------------------------<  110<H>  4.5   |  22  |  1.94<H>    Ca    7.9<L>      20 Apr 2021 03:32  Phos  2.9     04-20  Mg     2.1     04-20    TPro  5.4<L>  /  Alb  2.5<L>  /  TBili  0.7  /  DBili  x   /  AST  38<H>  /  ALT  54  /  AlkPhos  76  04-20      proBNP: Serum Pro-Brain Natriuretic Peptide: 625 pg/mL (04-19 @ 10:22)    Lipid Profile:   HgA1c:   TSH:   Test                            Date  WBC         10.06               04-20 @ 03:32  RBC         3.54                04-20 @ 03:32  Hemoglobin  10.7                04-20 @ 03:32  Hematocrit  33.5                04-20 @ 03:32  Platelets   214                 04-20 @ 03:32  Creatinine  1.94                04-20 @ 03:32  Test                            Date  WBC         7.46                04-19 @ 20:53  RBC         3.81                04-19 @ 20:53  Hemoglobin  11.7                04-19 @ 20:53  Hematocrit  37.3                04-19 @ 20:53  Platelets   174                 04-19 @ 20:53  Creatinine  2.25                04-19 @ 20:53  Test                            Date  WBC         8.78                04-19 @ 15:01  RBC         3.83                04-19 @ 15:01  Hemoglobin  11.4                04-19 @ 15:01  Hematocrit  36.2                04-19 @ 15:01  Platelets   241                 04-19 @ 15:01  Creatinine  2.12                04-19 @ 15:01  Test                            Date  WBC         14.04               04-19 @ 10:22  RBC         4.12                04-19 @ 10:22  Hemoglobin  12.4                04-19 @ 10:22  Hematocrit  39.1                04-19 @ 10:22  Platelets   332                 04-19 @ 10:22  Creatinine  2.51                04-19 @ 10:22          Monitor: NSR

## 2021-04-21 NOTE — SWALLOW BEDSIDE ASSESSMENT ADULT - SLP GENERAL OBSERVATIONS
pt seen bedside, alert and oriented to self. pt engaged with SLP, pleasantly confused, noted tangential comments and she was able to follow directions for oral [facility] exam. noted good speech intelligibility for short utterances with raspy vocal quality and decreased breath support.

## 2021-04-21 NOTE — PHARMACOTHERAPY INTERVENTION NOTE - COMMENTS
Recommended discontinuation of pantoprazole 40 daily since stress ulcer prophylaxis no longer necessary

## 2021-04-21 NOTE — CHART NOTE - NSCHARTNOTEFT_GEN_A_CORE
ICU/CCU Transfer Note    Transfer from: ICU/ CCU  Transfer to: MEdical surgical   Accepting physician:  Case discussed with: Dr Gonzalez       MICU COURSE:  89 F with CAD s/p CABG, HTN, HLD and Parkinson's disease presents with severe sepsis without shock and acute hypoxemic respiratory failure requiring intubation and mechanical ventilation. Suspect aspiration pneumonia vs UTI / cystitis. On empiric with Zosyn.          ASSESSMENT & PLAN:   ===================      ##Neuro:  -dementia at baseline     ##PULM:  -aspiration pneumonia   -extubated 4/20/2021  -continue Nasal cannula 2 L     ##CV:  -continue furosemide   Injectable 20  -continue lovastatin     ##GI:  -NPO   -will consult GI ? PEG     ##RENAL:   -monitor urine output     ##ENDO:    ##ID:   -continue zosyn for aspiration     ##MSC:   Physical therapy         VITALS  ========  Vital Signs Last 24 Hrs  T(C): 36.2 (21 Apr 2021 07:30), Max: 37.2 (21 Apr 2021 05:00)  T(F): 97.2 (21 Apr 2021 07:30), Max: 99 (21 Apr 2021 05:00)  HR: 83 (21 Apr 2021 11:00) (68 - 93)  BP: 146/113 (21 Apr 2021 11:00) (96/58 - 151/42)  BP(mean): 121 (21 Apr 2021 11:00) (60 - 121)  RR: 28 (21 Apr 2021 11:00) (16 - 29)  SpO2: 97% (21 Apr 2021 11:00) (90% - 99%)  I&O's Summary    20 Apr 2021 07:01  -  21 Apr 2021 07:00  --------------------------------------------------------  IN: 1441.4 mL / OUT: 1870 mL / NET: -428.6 mL    21 Apr 2021 07:01  -  21 Apr 2021 12:24  --------------------------------------------------------  IN: 0 mL / OUT: 225 mL / NET: -225 mL          LABS                                            9.7                   Neurophils% (auto):   86.7   (04-21 @ 04:53):    10.02)-----------(170          Lymphocytes% (auto):  6.9                                           29.5                   Eosinphils% (auto):   0.3      Manual%: Neutrophils x    ; Lymphocytes x    ; Eosinophils x    ; Bands%: x    ; Blasts x                                    143    |  111    |  35                  Calcium: 8.1   / iCa: x      (04-21 @ 04:53)    ----------------------------<  99        Magnesium: 2.3                              3.8     |  23     |  1.99             Phosphorous: 3.2      TPro  5.3    /  Alb  2.2    /  TBili  0.5    /  DBili  x      /  AST  34     /  ALT  43     /  AlkPhos  67     21 Apr 2021 04:53 ICU/CCU Transfer Note    Transfer from: ICU/ CCU  Transfer to: MEdical surgical   Accepting physician:  Case discussed with: Dr Gonzalez       MICU COURSE:  89 F with CAD s/p CABG, HTN, HLD and Parkinson's disease presents with severe sepsis without shock and acute hypoxemic respiratory failure requiring intubation and mechanical ventilation. Suspect aspiration pneumonia vs UTI / cystitis. On empiric with Zosyn.          ASSESSMENT & PLAN:   ===================  89 year old female admitted with choking episode, aspiration pneumonia, required intubation     ##Neuro:  -Parkinsons dementia at baseline     ##PULM:  -aspiration pneumonia   -extubated 4/20/2021  -continue Nasal cannula 2 L     ##CV:  -continue furosemide Injectable 20 (home dose 40mg )   -continue lovastatin   -HOLD BP medications may need to resume metoprolol/ spironolactone as tolerated     ##GI:  -NPO failed s&s  -will consult GI (Dr Borja)     ##RENAL:   -monitor urine output     ##ENDO:  -monitor blood glucose     ##ID:   -continue zosyn for aspiration through 4/26    ##MSC:   Physical therapy     VITALS  ========  Vital Signs Last 24 Hrs  T(C): 36.2 (21 Apr 2021 07:30), Max: 37.2 (21 Apr 2021 05:00)  T(F): 97.2 (21 Apr 2021 07:30), Max: 99 (21 Apr 2021 05:00)  HR: 83 (21 Apr 2021 11:00) (68 - 93)  BP: 146/113 (21 Apr 2021 11:00) (96/58 - 151/42)  BP(mean): 121 (21 Apr 2021 11:00) (60 - 121)  RR: 28 (21 Apr 2021 11:00) (16 - 29)  SpO2: 97% (21 Apr 2021 11:00) (90% - 99%)  I&O's Summary    20 Apr 2021 07:01  -  21 Apr 2021 07:00  --------------------------------------------------------  IN: 1441.4 mL / OUT: 1870 mL / NET: -428.6 mL    21 Apr 2021 07:01  -  21 Apr 2021 12:24  --------------------------------------------------------  IN: 0 mL / OUT: 225 mL / NET: -225 mL          LABS                                            9.7                   Neurophils% (auto):   86.7   (04-21 @ 04:53):    10.02)-----------(170          Lymphocytes% (auto):  6.9                                           29.5                   Eosinphils% (auto):   0.3      Manual%: Neutrophils x    ; Lymphocytes x    ; Eosinophils x    ; Bands%: x    ; Blasts x                                    143    |  111    |  35                  Calcium: 8.1   / iCa: x      (04-21 @ 04:53)    ----------------------------<  99        Magnesium: 2.3                              3.8     |  23     |  1.99             Phosphorous: 3.2      TPro  5.3    /  Alb  2.2    /  TBili  0.5    /  DBili  x      /  AST  34     /  ALT  43     /  AlkPhos  67     21 Apr 2021 04:53

## 2021-04-21 NOTE — SWALLOW BEDSIDE ASSESSMENT ADULT - COMMENTS
inhalation after swallow CT chest with IV contrast 4/19/2021 IMPRESSION:1. Right lung nodular opacities, compatible with pneumonia.  2. Enteric tube coiled in distal esophagus. Recommend repositioning.  3. Rectum distended by stool. Otherwise no bowel obstruction.  4. Urinary bladder findings compatible with cystitis.    CT head no contrast 4/2021 IMPRESSION:  Mild chronic microvascular changes without evidence of an acute transcortical infarction or hemorrhage.

## 2021-04-21 NOTE — PROGRESS NOTE ADULT - SUBJECTIVE AND OBJECTIVE BOX
Interval Events:  Extubated yesterday  Saturating well on 3L NC  Mental status improved    REVIEW OF SYSTEMS:  [x] All other systems negative  [ ] Unable to assess ROS because ________    OBJECTIVE:  ICU Vital Signs Last 24 Hrs  T(C): 36.2 (2021 07:30), Max: 37.2 (2021 05:00)  T(F): 97.2 (2021 07:30), Max: 99 (2021 05:00)  HR: 83 (2021 11:00) (68 - 93)  BP: 146/113 (2021 11:00) (96/58 - 151/42)  BP(mean): 121 (2021 11:00) (58 - 121)  ABP: --  ABP(mean): --  RR: 28 (2021 11:00) (16 - 29)  SpO2: 97% (2021 11:00) (90% - 99%)         @ 07: @ 07:00  --------------------------------------------------------  IN: 1441.4 mL / OUT: 1870 mL / NET: -428.6 mL     @ 07: @ 11:56  --------------------------------------------------------  IN: 0 mL / OUT: 225 mL / NET: -225 mL      CAPILLARY BLOOD GLUCOSE      POCT Blood Glucose.: 132 mg/dL (2021 11:35)      PHYSICAL EXAM:  General: NAD  HEENT: NC/AT  Neck: Supple  Respiratory: Bibasilar crackles  Cardiovascular: RRR, no edema  Abdomen: Soft, nontender, nondistended  Extremities: Warm  Skin: Intact  Neurological: A&Ox1 (name)    HOSPITAL MEDICATIONS:  aspirin  chewable 81 milliGRAM(s) Oral daily  heparin   Injectable 5000 Unit(s) SubCutaneous every 12 hours    piperacillin/tazobactam IVPB.. 3.375 Gram(s) IV Intermittent every 12 hours    furosemide   Injectable 20 milliGRAM(s) IV Push daily    atorvastatin 20 milliGRAM(s) Oral at bedtime                  chlorhexidine 2% Cloths 1 Application(s) Topical <User Schedule>        LABS:                        9.7    10.02 )-----------( 170      ( 2021 04:53 )             29.5     Hgb Trend: 9.7<--, 10.7<--, 11.7<--, 11.4<--, 12.4<--      143  |  111<H>  |  35<H>  ----------------------------<  99  3.8   |  23  |  1.99<H>    Ca    8.1<L>      2021 04:53  Phos  3.2       Mg     2.3         TPro  5.3<L>  /  Alb  2.2<L>  /  TBili  0.5  /  DBili  x   /  AST  34  /  ALT  43  /  AlkPhos  67      Creatinine Trend: 1.99<--, 1.94<--, 2.25<--, 2.12<--, 2.51<--    Urinalysis Basic - ( 2021 12:57 )    Color: Yellow / Appearance: Clear / S.010 / pH: x  Gluc: x / Ketone: Negative  / Bili: Negative / Urobili: Negative mg/dL   Blood: x / Protein: 30 mg/dL / Nitrite: Positive   Leuk Esterase: Moderate / RBC: 3-5 /HPF / WBC 6-10   Sq Epi: x / Non Sq Epi: Occasional / Bacteria: Few      Arterial Blood Gas:   @ 08:59  --/25/139/21/99/-0.6  ABG lactate: --  Arterial Blood Gas:   @ 12:22  --/49/94//94/-4.0  ABG lactate: --        MICROBIOLOGY:   Culture - Urine (21 @ 18:12)    Specimen Source: .Urine Catheterized    Culture Results:   50,000 - 99,000 CFU/mL Escherichia coli

## 2021-04-21 NOTE — SWALLOW BEDSIDE ASSESSMENT ADULT - PHARYNGEAL PHASE
Delayed pharyngeal swallow/Decreased laryngeal elevation/Multiple swallows Delayed pharyngeal swallow/Decreased laryngeal elevation/Wet vocal quality post oral intake/Cough post oral intake/Multiple swallows

## 2021-04-21 NOTE — SWALLOW BEDSIDE ASSESSMENT ADULT - SWALLOW EVAL: DIAGNOSIS
oropharyngeal phases of swallow marked by overall weakness/ WOB, slightly increased oral transport time, suspect delay in swallow trigger, with decreased hyolaryngeal elevation, multiple swallows and clinical signs of aspiration/cough with honey thick liquids. suggest pt not safe for po diet at this time at high risk for aspiration, malnutrition and dehydration

## 2021-04-21 NOTE — PHYSICAL THERAPY INITIAL EVALUATION ADULT - ADDITIONAL COMMENTS
Spoke with patient's daughter, Megan Lyons, who reports that patient lives in [facility]. Patient was able to ambulate with a rollator prior to admission and required assistance with ADLs.

## 2021-04-21 NOTE — PHYSICAL THERAPY INITIAL EVALUATION ADULT - PERTINENT HX OF CURRENT PROBLEM, REHAB EVAL
Patient admitted with choking and respiratory distress, with O2 saturation in 80s. Patient intubated for airway protection, found to have severe sepsis with shock and acute hypoxemic respiratory failure due to aspiration pneumonia.

## 2021-04-21 NOTE — PROGRESS NOTE ADULT - ASSESSMENT
generally better  renal function improving  oxygentation ok  no acute cv issues  usual meds as tolerated  PT, Sinemet

## 2021-04-21 NOTE — PHYSICAL THERAPY INITIAL EVALUATION ADULT - PERSONAL SAFETY AND JUDGMENT, REHAB EVAL
Patient is about 6 weeks pregnant.  She is having stretching abdominal pains when she rolls over in bed or gets up.  She is not bleeding and only a little cramping.  This is her fourth child.  Advised patient this may be round ligament pain.  She will observe and call with concerns.  
Patient is in her early stages of pregnancy and states she is having stomach pain.  Requesting to be seen to make sure everything is okay  
attempting to pull out NGT when mittens are removed/at risk behaviors demonstrated

## 2021-04-21 NOTE — PROGRESS NOTE ADULT - ASSESSMENT
89 F with CAD s/p CABG, HTN, HLD and Parkinson's disease presents with severe sepsis without shock and acute hypoxemic respiratory failure requiring intubation and mechanical ventilation. Suspect aspiration pneumonia vs UTI / cystitis. On empiric with Zosyn.    NEURO: Mental status continues to improve. Responds to simple yes/no questions.  CVS: Remains HD stable. Furosemide for hypertension and mild edema.  PULM: Saturating well on 3 L NC.  GI: NPO for now given aspiration risk. Follow up with speech pathology.  RENAL: DONITA improved  ENDO: Monitor glucose levels  ID: Empiric Zosyn for aspiration pneumonia. Will also cover e. coli in urine. Follow up culture sensitivities.  PPX: HSQ  Full Code 89 F with CAD s/p CABG, HTN, HLD and Parkinson's disease presents with severe sepsis without shock and acute hypoxemic respiratory failure requiring intubation and mechanical ventilation. Suspect aspiration pneumonia vs UTI / cystitis. On empiric with Zosyn.    NEURO: Mental status continues to improve. Responds to simple yes/no questions.  CVS: Remains HD stable. Furosemide for hypertension and mild edema.  PULM: Saturating well on 3 L NC.  GI: NPO for now given aspiration risk. Follow up with speech pathology.  RENAL: DONITA improved  ENDO: Monitor glucose levels  ID: Empiric Zosyn for aspiration pneumonia. Will also cover e. coli in urine. Follow up culture sensitivities.  PPX: HSQ  Full Code  Eligible for transfer to floors

## 2021-04-21 NOTE — PROGRESS NOTE ADULT - SUBJECTIVE AND OBJECTIVE BOX
INTERVAL HISTORY:  Findings noted  comfortable, more alert, s/p extubation 4/20    PAST MEDICAL & SURGICAL HISTORY:  CAD (coronary artery disease)  s/p CABG, PPM    HTN (hypertension)    HLD (hyperlipidemia)    Artificial pacemaker          MEDICATIONS:  MEDICATIONS  (STANDING):  aspirin  chewable 81 milliGRAM(s) Oral daily  atorvastatin 20 milliGRAM(s) Oral at bedtime  chlorhexidine 2% Cloths 1 Application(s) Topical <User Schedule>  furosemide   Injectable 20 milliGRAM(s) IV Push daily  heparin   Injectable 5000 Unit(s) SubCutaneous every 12 hours  piperacillin/tazobactam IVPB.. 3.375 Gram(s) IV Intermittent every 12 hours    MEDICATIONS  (PRN):      PHYSICAL EXAM:  T(F): 98.5 (04-21-21 @ 15:34), Max: 99 (04-21-21 @ 05:00)  HR: 77 (04-21-21 @ 16:00) (63 - 93)  BP: 152/47 (04-21-21 @ 16:00) (126/60 - 152/47)  RR: 25 (04-21-21 @ 16:00) (17 - 29)  SpO2: 99% (04-21-21 @ 16:00) (90% - 99%)  Wt(kg): --  I&O's Summary    20 Apr 2021 07:01  -  21 Apr 2021 07:00  --------------------------------------------------------  IN: 1441.4 mL / OUT: 1870 mL / NET: -428.6 mL    21 Apr 2021 07:01  -  21 Apr 2021 16:44  --------------------------------------------------------  IN: 40 mL / OUT: 1125 mL / NET: -1085 mL      Height (cm): 160 (04-20 @ 17:00)    PHYSICAL EXAM:    HEENT: sclera anicteric, conjunctiva normal  JVP normal  Carotids: normal upstroke  Lungs:  crackles bilaterally  Cor: normal S1S2, no S3, rub  Abdomen:  normal bs, nontender, nondistended, no organomegaly  Ext:  no edema        OTHER: 	    LABS:	     CARDIAC MARKERS:  Troponin I, Serum: <.015 ng/mL (04-19 @ 10:22)                                  9.7    10.02 )-----------( 170      ( 21 Apr 2021 04:53 )             29.5     04-21    143  |  111<H>  |  35<H>  ----------------------------<  99  3.8   |  23  |  1.99<H>    Ca    8.1<L>      21 Apr 2021 04:53  Phos  3.2     04-21  Mg     2.3     04-21    TPro  5.3<L>  /  Alb  2.2<L>  /  TBili  0.5  /  DBili  x   /  AST  34  /  ALT  43  /  AlkPhos  67  04-21      proBNP: Serum Pro-Brain Natriuretic Peptide: 625 pg/mL (04-19 @ 10:22)    Lipid Profile:   HgA1c:   TSH:   Test                            Date  WBC         10.02               04-21 @ 04:53  RBC         3.20                04-21 @ 04:53  Hemoglobin  9.7                 04-21 @ 04:53  Hematocrit  29.5                04-21 @ 04:53  Platelets   170                 04-21 @ 04:53  Creatinine  1.99                04-21 @ 04:53  Test                            Date  WBC         10.06               04-20 @ 03:32  RBC         3.54                04-20 @ 03:32  Hemoglobin  10.7                04-20 @ 03:32  Hematocrit  33.5                04-20 @ 03:32  Platelets   214                 04-20 @ 03:32  Creatinine  1.94                04-20 @ 03:32  Test                            Date  WBC         7.46                04-19 @ 20:53  RBC         3.81                04-19 @ 20:53  Hemoglobin  11.7                04-19 @ 20:53  Hematocrit  37.3                04-19 @ 20:53  Platelets   174                 04-19 @ 20:53  Creatinine  2.25                04-19 @ 20:53           no new Xrays

## 2021-04-22 NOTE — CONSULT NOTE ADULT - PROBLEM SELECTOR RECOMMENDATION 2
failed first swallow eval -family requests re-eval now that pt has been off vent for some days   remains with NGT feeds on  explained to daughter via phone that PEG would not completely remove risk of aspiration nor would it prolong life  pt alert and appears coherent to understand when asked if she wanted a feeding tube she said no

## 2021-04-22 NOTE — CONSULT NOTE ADULT - REASON FOR ADMISSION
Aspiration PNA, Respiratory Failure

## 2021-04-22 NOTE — PROGRESS NOTE ADULT - ASSESSMENT
89 F with CAD s/p CABG, HTN, HLD and Parkinson's disease presents with severe sepsis without shock and acute hypoxemic respiratory failure requiring intubation and mechanical ventilation. Suspect aspiration pneumonia vs UTI / cystitis. On empiric with Zosyn.    severe sepsis without shock  acute hypoxemic respiratory failure requiring intubation and mechanical ventilation.   Suspect aspiration pneumonia vs UTI / cystitis.   Acute metabolic encephalopathy  Debility  Lacitic acidosis  -On empiric with Zosyn. ivf  -Mental status continues to improve. Responds to simple yes/no questions.    hypertension and mild edema.  CAD s/p CABG, HLD  -cont on current care    Dysphagia  -NPO for now given aspiration risk. Follow up with speech pathology.    DONITA improved  Hypokalemia- replaced, monitor   Parkinsons  -supportive care, PT, Sinemet    PPX: HSQ  Full Code, Palliative care  daughter Megan Lyons [phone].

## 2021-04-22 NOTE — CONSULT NOTE ADULT - PROBLEM SELECTOR RECOMMENDATION 3
progressively more dependent for care  has lived at [facility] for 2 years now, pt's daughter noted a change and more precipitous decline in the past year possibly due to isolation during COVID lockdown

## 2021-04-22 NOTE — CONSULT NOTE ADULT - PROBLEM SELECTOR RECOMMENDATION 8
spoke with patient's daughter/HCP Megan Serena , she shared some history of her mother's spoke with patient's daughter/HCP Megan Lyons , she shared some history of her mother's condition and functional decline as of the past year. She stated that she was her mother's proxy (HCP in chart), but was not truly clear on her mother's wishes regarding artificial nutrition and hydration but shared that her mother told her yesterday that she wants to live. What needs to be understood is what kind of life does pt desire at 89 years old? one wrought with hospitalizations, feeding tube and potentially repeat need for breathing tube? or one that she can live out her days in the assisted facility or a facility that could meet her needs understanding risk of aspiration is always there and that would be accepted allowing the pt to eat by mouth and allow a natural progression to death as expected. Megan wants time to think about this and talk to her mother and other family members before declaring any decisions, she requests speech pathology re-evaluate her mother also. Will follow up with daughter in the coming days.

## 2021-04-22 NOTE — PROGRESS NOTE ADULT - SUBJECTIVE AND OBJECTIVE BOX
Patient is a 89y old  Female who presents with a chief complaint of Aspiration PNA, Respiratory Failure (22 Apr 2021 11:15)      OVERNIGHT EVENTS: none     REVIEW OF SYSTEMS: poor historian    MEDICATIONS  (STANDING):  aspirin  chewable 81 milliGRAM(s) Oral daily  atorvastatin 20 milliGRAM(s) Oral at bedtime  chlorhexidine 2% Cloths 1 Application(s) Topical <User Schedule>  furosemide   Injectable 20 milliGRAM(s) IV Push daily  heparin   Injectable 5000 Unit(s) SubCutaneous every 12 hours  piperacillin/tazobactam IVPB.. 3.375 Gram(s) IV Intermittent every 12 hours    MEDICATIONS  (PRN):      Allergies    sulfa drugs (Unknown)    Intolerances        T(F): 99.8 (04-22-21 @ 06:05), Max: 100.4 (04-22-21 @ 00:17)  HR: 62 (04-22-21 @ 06:05) (62 - 88)  BP: 173/78 (04-22-21 @ 06:05) (131/47 - 173/78)  RR: 17 (04-22-21 @ 06:05) (17 - 28)  SpO2: 98% (04-22-21 @ 06:05) (90% - 99%)  Wt(kg): --    PHYSICAL EXAM:  GENERAL: NAD  HEAD:  Atraumatic, Normocephalic  EYES: PERRLA, conjunctiva and sclera clear  ENMT: Moist mucous membranes  NECK: Supple, No JVD, Normal thyroid  NERVOUS SYSTEM:  Alert & Awake  CHEST/LUNG: Clear to percussion bilaterally;   HEART: Regular rate and rhythm;   ABDOMEN: Soft, Nontender, Nondistended; Bowel sounds present  EXTREMITIES:  no edema BL LE  SKIN: moist    LABS:                        10.4   11.36 )-----------( 208      ( 22 Apr 2021 08:23 )             31.9     04-22    144  |  111<H>  |  37<H>  ----------------------------<  128<H>  3.4<L>   |  27  |  2.00<H>    Ca    8.8      22 Apr 2021 08:23  Phos  3.2     04-22  Mg     2.6     04-22    TPro  5.3<L>  /  Alb  2.2<L>  /  TBili  0.5  /  DBili  x   /  AST  34  /  ALT  43  /  AlkPhos  67  04-21        Cultures;   CAPILLARY BLOOD GLUCOSE        Lipid panel:           RADIOLOGY & ADDITIONAL TESTS:    Imaging Personally Reviewed:  [x ] YES      Consultant(s) Notes Reviewed:  [x ] YES     Care Discussed with [x ] Consultants [X ] Patient [ ] Family  [x ]    [x ]  Other; RN

## 2021-04-22 NOTE — CONSULT NOTE ADULT - PROBLEM SELECTOR RECOMMENDATION 9
pt denies any dyspnea but is visibly breathing with some effort   has nasal oxygen on with good O2 sat

## 2021-04-22 NOTE — CONSULT NOTE ADULT - TIME BILLING
GI
evaluation of pt, review of chart, records and diagnostics, collaboration of care with primary attending and consultants and 25 minute phone conversation with pt's daughter/proxy regarding nutritional GOC and overall advance care plans.

## 2021-04-22 NOTE — CONSULT NOTE ADULT - PROBLEM SELECTOR PROBLEM 4
DONITA (acute kidney injury) Aspiration pneumonia of right lung, unspecified aspiration pneumonia type, unspecified part of lung

## 2021-04-22 NOTE — CONSULT NOTE ADULT - PROBLEM SELECTOR RECOMMENDATION 6
pt easily awakens to verbal stimulus is oriented to self and did not know her whereabouts or events leading to her hospitalization but once I updated her she appeared to understand

## 2021-04-22 NOTE — CONSULT NOTE ADULT - ASSESSMENT
Acute respiratory failure  no evidence of pulmonary edema or ACS  known normal LV function and no significant valvulopathy  PPM to be interrogated to exclude arrhythmic event    Parkinson's disease  Hypertension  ASHD; no evidence MI/ACS  CKD - s/p contrast today    Prognosis guarded.  Daughter informed.
HPI:  Patient is a 89yo F with PMHx CAD s/p CABG, HTN, HLD, Parkinsons. History collected from daughter. Patient was eating breakfast this morning at the table when she developed some choking and respiratory distress. Patients daughter called EMS. In the ED, noted to have O2 saturation in the 80s. Patient started on BIPAP for support. Patient then developed vomiting while on BIPAP. Patient with worsening mental status at the time and given likely aspiration was intubated for airway protection. ICU consulted for respiratory failure and airway management.  (19 Apr 2021 12:39)  ------------ As Above --------------------------- history obtained from chart and daughter  Consult called because she failed speech evaluation. Patient now w/o any melena, hematochezia, hematemesis, nausea, vomiting, abdominal pain, constipation, diarrhea, or change in bowel movements   tolerating NGT feeds  The daughter denies any recent melena, hematochezia, hematemesis, nausea, vomiting, abdominal pain, constipation, diarrhea, or change in bowel movements   last colonoscopy ? No abdominal surgery    Dysphagia - Patient failed speech evaluation. MS improving Discussed with family ~ 25 minutes 1) Repeat speech evaluation If no improvement, family will give consent for procedure Will hold ASA in the meantime. 
 89yo F with PMHx CAD s/p CABG, HTN, HLD, Parkinsons who resides at [facility] admitted initially to ICU for acute respiratory failure after having a choking episode has since been extubated but remains with dysphagia and NGT in place. Palliative Care consulted for assistance with GOC specifically in regards to PEG discussion.

## 2021-04-22 NOTE — CONSULT NOTE ADULT - SUBJECTIVE AND OBJECTIVE BOX
HPI:  Patient is a 89yo F with PMHx CAD s/p CABG, HTN, HLD, Parkinsons. History collected from daughter. Patient was eating breakfast this morning at the table when she developed some choking and respiratory distress. Patients daughter called EMS. In the ED, noted to have O2 saturation in the 80s. Patient started on BIPAP for support. Patient then developed vomiting while on BIPAP. Patient with worsening mental status at the time and given likely aspiration was intubated for airway protection. ICU consulted for respiratory failure and airway management.  (19 Apr 2021 12:39)  ------------ As Above --------------------------- history obtained from chart and daughter  Consult called because she failed speech evaluation. Patient now w/o any melena, hematochezia, hematemesis, nausea, vomiting, abdominal pain, constipation, diarrhea, or change in bowel movements   tolerating NGT feeds  The daughter denies any recent melena, hematochezia, hematemesis, nausea, vomiting, abdominal pain, constipation, diarrhea, or change in bowel movements   last colonoscopy ? No abdominal surgery    PAST MEDICAL & SURGICAL HISTORY:  CAD (coronary artery disease)  s/p CABG, PPM    HTN (hypertension)    HLD (hyperlipidemia)    Artificial pacemaker        MEDICATIONS  (STANDING):  aspirin  chewable 81 milliGRAM(s) Oral daily  atorvastatin 20 milliGRAM(s) Oral at bedtime  chlorhexidine 2% Cloths 1 Application(s) Topical <User Schedule>  furosemide   Injectable 20 milliGRAM(s) IV Push daily  heparin   Injectable 5000 Unit(s) SubCutaneous every 12 hours  piperacillin/tazobactam IVPB.. 3.375 Gram(s) IV Intermittent every 12 hours    MEDICATIONS  (PRN):      Allergies    sulfa drugs (Unknown)    Intolerances        FAMILY HISTORY:      REVIEW OF SYSTEMS:    CONSTITUTIONAL: No fever, weight loss,  EYES: No eye pain, visual disturbances, or discharge  ENMT:  No difficulty hearing, tinnitus, vertigo; No sinus or throat pain  NECK: No pain or stiffness  BREASTS: No pain, masses, or nipple discharge  RESPIRATORY: No cough, wheezing, chills or hemoptysis; No shortness of breath  CARDIOVASCULAR: No chest pain, palpitations, dizziness, or leg swelling  GASTROINTESTINAL: See above   GENITOURINARY: No dysuria, frequency, hematuria, or incontinence  NEUROLOGICAL: No headaches,  numbness, or tremors  SKIN: No itching, burning, rashes, or lesions   LYMPH NODES: No enlarged glands  ENDOCRINE: No heat or cold intolerance; No hair loss  MUSCULOSKELETAL: No joint pain or swelling; No muscle, back, or extremity pain  PSYCHIATRIC: No depression, anxiety, mood swings, or difficulty sleeping  HEME/LYMPH: No easy bruising, or bleeding gums  ALLERGY AND IMMUNOLOGIC: No hives or eczema          SOCIAL HISTORY:    FAMILY HISTORY:      Vital Signs Last 24 Hrs  T(C): 36.8 (22 Apr 2021 12:52), Max: 38 (22 Apr 2021 00:17)  T(F): 98.3 (22 Apr 2021 12:52), Max: 100.4 (22 Apr 2021 00:17)  HR: 54 (22 Apr 2021 14:35) (54 - 74)  BP: 166/78 (22 Apr 2021 14:35) (154/66 - 173/78)  BP(mean): --  RR: 17 (22 Apr 2021 12:52) (17 - 18)  SpO2: 96% (22 Apr 2021 14:35) (95% - 99%)    PHYSICAL EXAM:    GENERAL: NAD, well-groomed, well-developed  HEAD:  Atraumatic, Normocephalic  EYES: EOMI, PERRLA, conjunctiva and sclera clear  NECK: Supple, No JVD, Normal thyroid  NERVOUS SYSTEM:  Alert & improving  CHEST/LUNG: Clear to percussion bilaterally; No rales, rhonchi, wheezing, or rubs  HEART: Regular rate and rhythm; No murmurs, rubs, or gallops  ABDOMEN: Soft, Nontender, Nondistended; Bowel sounds present  EXTREMITIES:  2+ Peripheral Pulses, No clubbing, cyanosis, or edema  LYMPH: No lymphadenopathy noted   RECTAL:  Deferred   SKIN: No rashes or lesions    LABS:                        10.4   11.36 )-----------( 208      ( 22 Apr 2021 08:23 )             31.9       CBC:  04-22 @ 08:23  WBC  11.36  HGB 10.4  HCT 31.9 Plate 208  MCV 92.7  04-21 @ 04:53  WBC  10.02  HGB 9.7  HCT 29.5 Plate 170  MCV 92.2  04-20 @ 03:32  WBC  10.06  HGB 10.7  HCT 33.5 Plate 214  MCV 94.6  04-19 @ 20:53  WBC  7.46  HGB 11.7  HCT 37.3 Plate 174  MCV 97.9  04-19 @ 15:01  WBC  8.78  HGB 11.4  HCT 36.2 Plate 241  MCV 94.5  04-19 @ 10:22  WBC  14.04  HGB 12.4  HCT 39.1 Plate 332  MCV 94.9           22 Apr 2021 08:23    144    |  111    |  37     ----------------------------<  128    3.4     |  27     |  2.00   21 Apr 2021 04:53    143    |  111    |  35     ----------------------------<  99     3.8     |  23     |  1.99   20 Apr 2021 03:32    139    |  108    |  40     ----------------------------<  110    4.5     |  22     |  1.94   19 Apr 2021 20:53    139    |  108    |  46     ----------------------------<  191    4.2     |  21     |  2.25   19 Apr 2021 15:01    123    |  89     |  45     ----------------------------<  713    3.8     |  21     |  2.12   19 Apr 2021 10:22    138    |  104    |  52     ----------------------------<  184    3.9     |  26     |  2.51     Ca    8.8        22 Apr 2021 08:23  Ca    8.1        21 Apr 2021 04:53  Ca    7.9        20 Apr 2021 03:32  Ca    7.9        19 Apr 2021 20:53  Ca    7.4        19 Apr 2021 15:01  Ca    9.0        19 Apr 2021 10:22  Phos  3.2       22 Apr 2021 08:23  Phos  3.2       21 Apr 2021 04:53  Phos  2.9       20 Apr 2021 03:32  Phos  3.2       19 Apr 2021 15:01  Mg     2.6       22 Apr 2021 08:23  Mg     2.3       21 Apr 2021 04:53  Mg     2.1       20 Apr 2021 03:32  Mg     2.2       19 Apr 2021 15:01    TPro  5.3    /  Alb  2.2    /  TBili  0.5    /  DBili  x      /  AST  34     /  ALT  43     /  AlkPhos  67     21 Apr 2021 04:53  TPro  5.4    /  Alb  2.5    /  TBili  0.7    /  DBili  x      /  AST  38     /  ALT  54     /  AlkPhos  76     20 Apr 2021 03:32  TPro  5.6    /  Alb  2.9    /  TBili  1.3    /  DBili  x      /  AST  37     /  ALT  59     /  AlkPhos  88     19 Apr 2021 15:01  TPro  7.8    /  Alb  3.7    /  TBili  0.4    /  DBili  x      /  AST  39     /  ALT  71     /  AlkPhos  123    19 Apr 2021 10:22            RADIOLOGY & ADDITIONAL STUDIES:
This 89 year old female is well known to me for many years.  She apparently was in her usual state of health until this AM at breakfast when she apparently aspirated.  She required intubation due to vomiting while on Bipap in the ER.   Issues d/w daughter and CCU staff.       Review of Systems:  Review of Systems: Unable to obtain as patient intubated      Allergies and Intolerances:        Allergies:  	sulfa drugs: Drug Category, Unknown    Home Medications:   * Patient Currently Takes Medications as of 26-Aug-2016 12:07 documented in Structured Notes  · 	Metoprolol Succinate ER 25 mg oral tablet, extended release: 1 tab(s) orally once a day  · 	pravastatin 80 mg oral tablet: 1 tab(s) orally once a day (at bedtime)  · 	aspirin 81 mg oral tablet:  orally   · 	vitamin E oral capsule:  orally   · 	Calcium 600+D oral tablet: 1 tab(s) orally 2 times a day  · 	furosemide 20 mg oral tablet: 1 tab(s) orally once a day  · 	Centrum oral tablet: 1 tab(s) orally once a day    Patient History:   Past Medical, Past Surgical, and Family History:  PAST MEDICAL HISTORY:  CAD (coronary artery disease) s/p CABG, PPM ([facility])    HLD (hyperlipidemia)     HTN (hypertension).     PAST SURGICAL HISTORY:  Artificial pacemaker.    Social History:  Social History (marital status, living situation, occupation, tobacco use, alcohol and drug use, and sexual history): Unable to obtain as patient intubated    Tobacco Screening:  · Core Measure Site	Yes  · Has the patient used tobacco in the past 30 days?	Unable to assess due to patient's cognitive impairment    Risk Assessment:   Present on Admission:  Deep Venous Thrombosis	no  Pulmonary Embolus	no  Urinary Catheter	no  Central Venous Catheter/PICC Line	no  Surgical Site Incision	no  Pressure Ulcer(s)	no    Heart Failure:  Does this patient have a history of or has been diagnosed with heart failure? unknown.    Physical Exam:     VS per EHR  NSR, pacing  sedated, intubated,   sclera anicteric  conj nl  lungs good bs bilaterally, symmetrical   no S3, rub, normal S1S2, no murmur  abd nontender bs nl  no edema           ra< from: CT Chest w/ IV Cont (04.19.21 @ 11:21) >    EXAM:  CT ABDOMEN AND PELVIS IC                          EXAM:  CT CHEST IC                            PROCEDURE DATE:  04/19/2021          INTERPRETATION:  CLINICAL INFORMATION: Sepsis    COMPARISON: None.    CONTRAST/COMPLICATIONS:  IV Contrast: Omnipaque 350  90 cc administered   10 cc discarded  Oral Contrast: NONE  Complications: None reported at time of study completion    PROCEDURE:  CT of the Chest, Abdomen and Pelvis was performed.  Sagittal and coronal reformats were performed.    FINDINGS:  CHEST:  LUNGS AND LARGE AIRWAYS: Endotracheal tube in place. Areas of mucoid impaction, especially lower lobe. Right upper, middle, and lower lobe nodular opacities, possibly infectious. Clear left lung.  PLEURA: No pleural effusion.  VESSELS:Atherosclerotic changes of thoracic aorta and coronary arteries.  HEART: Heart size is normal. No pericardial effusion.  MEDIASTINUM AND SCOTT: Small calcified mediastinal lymph nodes.  CHEST WALL AND LOWER NECK: Status post sternotomy. Left upper anterior chest wall cardiac device with leads to the heart.    ABDOMEN AND PELVIS:  LIVER: Within normal limits.  BILE DUCTS: Normal caliber.  GALLBLADDER: Gallbladder fundus adenomyomatosis.  SPLEEN: Within normal limits.  PANCREAS: Within normal limits.  ADRENALS: Within normal limits.  KIDNEYS/URETERS: Renal cortical scarring. Subcentimeter renal hypodensities, too small to further characterize. No hydronephrosis.    BLADDER: Wall thickening and inflammation.  REPRODUCTIVE ORGANS: Small calcified uterine fibroids.    BOWEL: Enteric tube coiled in distal esophagus. Moderate hiatal hernia. No bowel obstruction. Appendix is normal. Rectum distended by stool.  PERITONEUM: No ascites.    VESSELS: Atherosclerotic changes.  RETROPERITONEUM/LYMPH NODES:No lymphadenopathy.  ABDOMINAL WALL: Fat and fluid containing periumbilical hernia.  BONES: Degenerative changes. Right femur ORIF.    IMPRESSION:    1. Right lung nodular opacities, compatible with pneumonia.  2. Enteric tube coiled in distal esophagus. Recommend repositioning.  3. Rectum distended by stool. Otherwise no bowel obstruction.  4. Urinary bladder findings compatible with cystitis.    Findings were discussed with Dr. Freeman by telephone on 4/19/2021 at 1201 hours.            SUHAIL MCWILLIAMS; Attending Radiologist  This document has been electronically signed. Apr 19 2021 12:03PM    < end of copied text >  < from: CT Chest w/ IV Cont (04.19.21 @ 11:21) >      < end of copied text >        Urine Microscopic-Add On (NC) (04.19.21 @ 12:57)   Bacteria: Few   Epithelial Cells: Occasional   Red Blood Cell - Urine: 3-5 /HPF   White Blood Cell - Urine: 6-10 Lactate, Blood (04.19.21 @ 14:28)   Lactate, Blood: 2.3: Elevated lactate. Consider ordering follow-up lactate to trend. mmol/L Complete Blood Count (04.19.21 @ 15:01)   Nucleated RBC: 0 /100 WBCs   WBC Count: 8.78 K/uL   RBC Count: 3.83 M/uL   Hemoglobin: 11.4 g/dL   Hematocrit: 36.2 %   Mean Cell Volume: 94.5 fl   Mean Cell Hemoglobin: 29.8 pg   Mean Cell Hemoglobin Conc: 31.5 gm/dL   Red Cell Distrib Width: 14.3 %   Platelet Count - Automated: 241 K/uL       Historical Values  Complete Blood Count (04.19.21 @ 15:01)   Nucleated RBC: 0 /100 WBCs   WBC Count: 8.78 K/uL   RBC Count: 3.83 M/uL   Hemoglobin: 11.4 g/dL   Hematocrit: 36.2 %   Mean Cell Volume: 94.5 fl   Mean Cell Hemoglobin: 29.8 pg   Mean Cell Hemoglobin Conc: 31.5 gm/dL   Red Cell Distrib Width: 14.3 %   Platelet Count - Automated: 241 K/uL   Complete Blood Count (04.19.21 @ 10:22)   Nucleated RBC: 0 /100 WBCs   WBC Count: 14.04 K/uL   RBC Count: 4.12 M/uL   Hemoglobin: 12.4 g/dL   Hematocrit: 39.1 %   Mean Cell Volume: 94.9 fl   Mean Cell Hemoglobin: 30.1 pg   Mean Cell Hemoglobin Conc: 31.7 gm/dL   Red Cell Distrib Width: 14.6 %   Platelet Count - Automated: 332 K/uL Blood Gas Profile - Arterial (04.19.21 @ 12:22)   pH, Blood: 7.28   Blood Gas Source: Arterial   Blood Gas Comments: artery punctured. site held. no bleeding. no hematoma.   pCO2, Arterial: 49 mmHg   pO2, Arterial: 94 mmHg   HCO3, Arterial: 22 mmol/L   Base Excess, Arterial: -4.0 mmol/L   Oxygen Saturation, Arterial: 94 %   FIO2, Arterial: 60       Historical Values  Blood Gas Profile - Arterial (04.19.21 @ 12:22)   pH, Blood: 7.28   Blood Gas Source: Arterial   Blood Gas Comments: artery punctured. site held. no bleeding. no hematoma.   pCO2, Arterial: 49 mmHg   pO2, Arterial: 94 mmHg   HCO3, Arterial: 22 mmol/L   Base Excess, Arterial: -4.0 mmol/L   Oxygen Saturation, Arterial: 94 %   FIO2, Arterial: 60   Blood Gas Profile - Arterial (04.19.21 @ 10:39)   pH, Blood: 7.29   Blood Gas Comments: bleeding noted angela test performed and positive   Blood Gas Source: Arterial   pCO2, Arterial: 51 mmHg   pO2, Arterial: 268 mmHg   HCO3, Arterial: 24 mmol/L   Base Excess, Arterial: -2.8 mmol/L   Oxygen Saturation, Arterial: 99 %   FIO2, Arterial: 100 Flu With COVID-19 By LORRAINE (04.19.21 @ 10:38)   SARS-CoV-2 Result: NotDetec: This Respiratory Panel uses polymerase chain reaction (PCR) to detect for   influenza A; influenza B; and SARS-CoV-2.   This test was validated by [facility] and is in use under the FDA   Emergency Use Authorization (EUA) for clinical labs CLIA-certified to   perform high complexity testing. Test results should be correlated with   clinical presentation, patient history, and epidemiology.   EUA/IVD   Influenza A Result: NotDetec: EUA/IVD   Influenza B Result: NotDetec: EUA/IVD Comprehensive Metabolic Panel (04.19.21 @ 10:22)   Sodium, Serum: 138 mmol/L   Potassium, Serum: 3.9 mmol/L   Chloride, Serum: 104 mmol/L   Carbon Dioxide, Serum: 26 mmol/L   Anion Gap, Serum: 8 mmol/L   Blood Urea Nitrogen, Serum: 52 mg/dL   Creatinine, Serum: 2.51 mg/dL   Glucose, Serum: 184 mg/dL   Calcium, Total Serum: 9.0 mg/dL   Protein Total, Serum: 7.8 gm/dL   Albumin, Serum: 3.7 g/dL   Bilirubin Total, Serum: 0.4 mg/dL   Alkaline Phosphatase, Serum: 123 U/L   Aspartate Aminotransferase (AST/SGOT): 39 U/L   Alanine Aminotransferase (ALT/SGPT): 71 U/L   eGFR if Non : 16: Interpretative comment   The units for eGFR are mL/min/1.73M2 (normalized body surface area). The   eGFR is calculated from a serum creatinine using the CKD-EPI equation.   Other variables required for calculation are race, age and sex. Among   patients with chronic kidney disease (CKD), the eGFR is useful in   determining the stage of disease according to KDOQI CKD classification.   All eGFR results are reported numerically with the following   interpretation.   GFR With Without   (ml/min/1.73 m2) Kidney Damage Kidney Damage   >= 90 Stage 1 Normal   60-89 Stage 2 Decreased GFR   30-59 Stage 3 Stage 3   15-29 Stage 4 Stage 4   < 15 Stage 5 Stage 5   Each stage of CKD assumes that the associated GFR level has been in   effect for at least 3 months. Determination of stages one and two (with   eGFR > 59 ml/min/m2) requires estimation of kidney damage for at least 3   months as defined by structural or functional abnormalities.   Limitations: All estimates of GFR will be less accurate for patients at   extremes of muscle mass (including but not limited to frail elderly,   critically ill, or cancer patients), those with unusual diets, and those   with conditions associated with reduced secretion or extrarenal   elimination of creatinine. The eGFR equation is not recommended for use   in patients with unstable creatinine levels. mL/min/1.73M2   eGFR if African American: 18 mL/min/1.73M2       Historical Values  Comprehensive Metabolic Panel (04.19.21 @ 10:22)   Sodium, Serum: 138 mmol/L   Potassium, Serum: 3.9 mmol/L   Chloride, Serum: 104 mmol/L   Carbon Dioxide, Serum: 26 mmol/L   Anion Gap, Serum: 8 mmol/L   Blood Urea Nitrogen, Serum: 52 mg/dL   Creatinine, Serum: 2.51 mg/dL   Glucose, Serum: 184 mg/dL   Calcium, Total Serum: 9.0 mg/dL   Protein Total, Serum: 7.8 gm/dL   Albumin, Serum: 3.7 g/dL   Bilirubin Total, Serum: 0.4 mg/dL   Alkaline Phosphatase, Serum: 123 U/L   Aspartate Aminotransferase (AST/SGOT): 39 U/L   Alanine Aminotransferase (ALT/SGPT): 71 U/L   eGFR if Non : 16: Interpretative comment   The units for eGFR are mL/min/1.73M2 (normalized body surface area). The   eGFR is calculated from a serum creatinine using the CKD-EPI equation.   Other variables required for calculation are race, age and sex. Among   patients with chronic kidney disease (CKD), the eGFR is useful in   determining the stage of disease according to KDOQI CKD classification.   All eGFR results are reported numerically with the following   interpretation.   GFR With Without   (ml/min/1.73 m2) Kidney Damage Kidney Damage   >= 90 Stage 1 Normal   60-89 Stage 2 Decreased GFR   30-59 Stage 3 Stage 3   15-29 Stage 4 Stage 4   < 15 Stage 5 Stage 5   Each stage of CKD assumes that the associated GFR level has been in   effect for at least 3 months. Determination of stages one and two (with   eGFR > 59 ml/min/m2) requires estimation of kidney damage for at least 3   months as defined by structural or functional abnormalities.   Limitations: All estimates of GFR will be less accurate for patients at   extremes of muscle mass (including but not limited to frail elderly,   critically ill, or cancer patients), those with unusual diets, and those   with conditions associated with reduced secretion or extrarenal   elimination of creatinine. The eGFR equation is not recommended for use   in patients with unstable creatinine levels. mL/min/1.73M2   eGFR if African American: 18 mL/min/1.73M2   Comprehensive Metabolic Panel (08.19.16 @ 00:36)   Sodium, Serum: 141 mmol/L   Potassium, Serum: 4.1 mmol/L   Chloride, Serum: 104 mmol/L   Carbon Dioxide, Serum: 31 mmol/L   Anion Gap, Serum: 6 mmol/L   Blood Urea Nitrogen, Serum: 38 mg/dL   Creatinine, Serum: 1.82 mg/dL   Glucose, Serum: 112 mg/dL   Calcium, Total Serum: 8.7 mg/dL   Protein Total, Serum: 7.3 gm/dL   Albumin, Serum: 3.7 g/dL   Bilirubin Total, Serum: 0.6 mg/dL   Alkaline Phosphatase, Serum: 92 U/L   Aspartate Aminotransferase (AST/SGOT): 19 U/L   Alanine Aminotransferase (ALT/SGPT): 11 U/L   eGFR if Non : 24: Interpretative comment   The units for eGFR are ml/min/1.73m2 (normalized body surface area). The   eGFR is calculated from a serum creatinine using the CKD-EPI equation.   Other variables required for calculation are race, age and sex. Among   patients with chronic kidney disease (CKD), the eGFR is useful in   determining the stage of disease according to KDOQI CKD classification.   All eGFR results are reported numerically with the following   interpretation.   GFR With Without   (ml/min/1.73 m2) Kidney Damage Kidney Damage   >= 90 Stage 1 Normal   60-89 Stage 2 Decreased GFR   30-59 Stage 3 Stage 3   15-29 Stage 4 Stage 4   < 15 Stage 5 Stage 5   Each stage of CKD assumes that the associated GFR level has been in   effect for at least 3 months. Determination of stages one and two (with   eGFR > 59 ml/min/m2) requires estimation of kidney damage for at least 3   months as defined by structural or functional abnormalities.   Limitations: All estimates of GFR will be less accurate for patients at   extremes of muscle mass (including but not limited to frail elderly,   critically ill, or cancer patients), those with unusual diets, and those   with conditions associated with reduced secretion or extrarenal   elimination of creatinine. The eGFR equation is not recommended for use   in patients with unstable creatinine levels. mL/min   eGFR if : 28 mL/min     Serum Pro-Brain Natriuretic Peptide: 625 pg/mL (04.19.21 @ 10:22) Troponin I, Serum: <.015: The new reference range for Troponin-I performed on the Siemens Vista   system is 0.015-0.045 ng/mL, which includes the 99th percentile of a   healthy reference population. Studies have shown that elevated troponin   levels above the 99th percentile cutoff are associated with an increased   risk for adverse cardiac events, with the risk increasing as troponin   levels increase. As per a joint committee of the American College of   Cardiology and European Society of Cardiology, diagnosis of classic MI is   based upon the detection of a rise or fall of cardiac troponin values,   with at least one value above the 99th percentile upper reference limit,   in the appropriate clinical context.   Troponin-I (ng/mL) Interpretation   0.00-0.045 Normal range (includes the 99th percentile of a healthy   reference population)   >0.045 Elevated troponin level indicating increased risk   Note: Troponin-I and Troponin-T cannot be used interchangeably in serial   measurements. Minimally elevated Troponin results should be interpreted   in the context of clinical findings and risk factors. ng/mL (04.19.21 @ 10:22)       Historical Values  Troponin I, Serum: <.015: The new reference range for Troponin-I performed on the Siemens Vista   system is 0.015-0.045 ng/mL, which includes the 99th percentile of a   healthy reference population. Studies have shown that elevated troponin   levels above the 99th percentile cutoff are associated with an increased   risk for adverse cardiac events, with the risk increasing as troponin   levels increase. As per a joint committee of the American College of   Cardiology and European Society of Cardiology, diagnosis of classic MI is   based upon the detection of a rise or fall of cardiac troponin values,   with at least one value above the 99th percentile upper reference limit,   in the appropriate clinical context.   Troponin-I (ng/mL) Interpretation   0.00-0.045 Normal range (includes the 99th percentile of a healthy   reference population)   >0.045 Elevated troponin level indicating increased risk   Note: Troponin-I and Troponin-T cannot be used interchangeably in serial   measurements. Minimally elevated Troponin results should be interpreted   in the context of clinical findings and risk factors. ng/mL (04.19.21 @ 10:22)   Troponin I, Serum: <.015: The new reference range for Troponin-I performed on the Siemens Vista   system is 0.015-0.045 ng/mL, which includes the 99th percentile of a   healthy reference population. Studies have shown that elevated troponin   levels above the 99th percentile cutoff are associated with an increased   risk for adverse cardiac events, with the risk increasing as troponin   levels increase. As per a joint committee of the American College of   Cardiology and European Society of Cardiology, diagnosis of classic MI is   based upon the detection of a rise or fall of cardiac troponin values,   with at least one value above the 99th percentile upper reference limit,   in the appropriate clinical context.   Troponin-I (ng/mL) Interpretation   0.00-0.045 Normal range (includes the 99th percentile of a healthy   reference population)   >0.045 Elevated troponin level indicating increased risk   Note: Troponin-I and Troponin-T cannot be used interchangeably in serial   measurements. Minimally elevated Troponin results should be interpreted   in the context of clinical findings and risk factors. ng/mL (08.19.16 @ 01:37) 
HPI:  Patient is a 89yo F with PMHx CAD s/p CABG, HTN, HLD, Parkinsons. History collected from daughter. Patient was eating breakfast this morning at the table when she developed some choking and respiratory distress. Patients daughter called EMS. In the ED, noted to have O2 saturation in the 80s. Patient started on BIPAP for support. Patient then developed vomiting while on BIPAP. Patient with worsening mental status at the time and given likely aspiration was intubated for airway protection. ICU consulted for respiratory failure and airway management.  (19 Apr 2021 12:39)    PERTINENT PM/SXH:   CAD (coronary artery disease)    HTN (hypertension)    HLD (hyperlipidemia)      Artificial pacemaker      FAMILY HISTORY:      SOCIAL HISTORY:   Significant other/partner: Yes [ ]  No [x ] Children:  Yes [ x]  No [ ] Islam/Spirituality: Confucianism  Substance hx: Yes[ ]  No [x ]   Tobacco hx:  Yes [ ] No [ x]   Alcohol hx: Yes [ ] No [x ]   Home Opioid hx:  Yes [ ] No [ ]  [ ] I-Stop Reference No:  Living Situation: [ ]Home  [ ]Long term care  [ ]Rehab [x ]Other- assisted living [facility]    ADVANCE DIRECTIVES:    DNR  MOLST  Yes [ ] No [ x]  Living Will  Yes [ ]  No [x ]     [x ] Health Care Proxy(s)  [ ] Surrogate(s)  [ ] Guardian           Name(s): Phone Number(s):  Megan Lyons /[phone]    BASELINE (I)ADL(s) (prior to admission):  [place]: [ ]Total  [ x] Moderate [ ]Dependent    Allergies    sulfa drugs (Unknown)    Intolerances    MEDICATIONS  (STANDING):  aspirin  chewable 81 milliGRAM(s) Oral daily  atorvastatin 20 milliGRAM(s) Oral at bedtime  chlorhexidine 2% Cloths 1 Application(s) Topical <User Schedule>  furosemide   Injectable 20 milliGRAM(s) IV Push daily  heparin   Injectable 5000 Unit(s) SubCutaneous every 12 hours  piperacillin/tazobactam IVPB.. 3.375 Gram(s) IV Intermittent every 12 hours    MEDICATIONS  (PRN):    PRESENT SYMPTOMS: [ ]Unable to obtain due to poor mentation   Source if other than patient:  [ ]Family   [ ]Team     Pain (Impact on QOL):  denies pain  Location -   Severity -        Minimal acceptable level (0-10 scale):  Quality:   Onset:   Duration:                 Aggravating factors -  Relieving factors -  Radiation -    PAIN AD Score:     http://[email or web address]/painad.pdf (press ctrl +  left click to view)    Dyspnea:  Yes [ ] No [ x] - [ ]Mild [ ]Moderate [ ]Severe  Anxiety:    Yes [ ] No [ x] - [ ]Mild [ ]Moderate [ ]Severe  Fatigue:    Yes [ ] No [ x] - [ ]Mild [ ]Moderate [ ]Severe  Nausea:    Yes [ ] No [x ] - [ ]Mild [ ]Moderate [ ]Severe                         Loss of appetite: Yes [ ] No [ x] - [ ]Mild [ ]Moderate [ ]Severe             Constipation:  Yes [ ] No [ x] - [ ]Mild [ ]Moderate [ ]Severe  Grief: Yes [ ] No [ x]     Other Symptoms: "I'm always hungry"  [ x]All other review of systems negative     Karnofsky Performance Score/Palliative Performance Status Version 2:     10  %    http://palliative.info/resource_material/[email or web address].pdf    PHYSICAL EXAM:  Vital Signs Last 24 Hrs  T(C): 37.7 (22 Apr 2021 06:05), Max: 38 (22 Apr 2021 00:17)  T(F): 99.8 (22 Apr 2021 06:05), Max: 100.4 (22 Apr 2021 00:17)  HR: 62 (22 Apr 2021 06:05) (62 - 88)  BP: 173/78 (22 Apr 2021 06:05) (131/47 - 173/78)  BP(mean): 74 (21 Apr 2021 16:00) (63 - 74)  RR: 17 (22 Apr 2021 06:05) (17 - 28)  SpO2: 98% (22 Apr 2021 06:05) (90% - 99%) I&O's Summary    21 Apr 2021 07:01  -  22 Apr 2021 07:00  --------------------------------------------------------  IN: 420 mL / OUT: 1625 mL / NET: -1205 mL        GENERAL:  [ x]Alert  [x ]Oriented x 2-3  [ ]Lethargic  [ ]Cachexia  [ ]Unarousable  [ x]Verbal  [ ]Non-Verbal  Behavioral:   [ ] Anxiety  [ ] Delirium [ ] Agitation [ ] Other  HEENT:  [ ]Normal   [ x]Dry mouth   [ ]ET Tube/Trach  [ ]Oral lesions  PULMONARY:   [ x]Clear  [ x]Tachypnea  [ ]Audible excessive secretions   [ ]Rhonchi        [ ]Right [ ]Left [ ]Bilateral  [ ]Crackles        [ ]Right [ ]Left [ ]Bilateral  [ ]Wheezing     [ ]Right [ ]Left [ ]Bilateral  CARDIOVASCULAR:    [ x]Regular [ ]Irregular [ ]Tachy  [ ]Dalton [ ]Murmur [ ]Other  GASTROINTESTINAL:  [x ]Soft  [ ]Distended   [x ]+BS  [x ]Non tender [ ]Tender  [ ]PEG [ x]OGT/ NGT  Last BM: 4/21    GENITOURINARY:  [ ]Normal [ x] Incontinent   [ ]Oliguria/Anuria   [ ]Maddox  MUSCULOSKELETAL:   [ ]Normal   [ x]Weakness  [ ]Bed/Wheelchair bound [ ]Edema  NEUROLOGIC:   [ ]No focal deficits  [x ] Cognitive impairment  [x Dysphagia [ ]Dysarthria [ ] Paresis [ ]Other   SKIN:   [x ]Normal   [ ]Pressure ulcer(s)  [ ]Rash    LABS:                        10.4   11.36 )-----------( 208      ( 22 Apr 2021 08:23 )             31.9   04-22    144  |  111<H>  |  37<H>  ----------------------------<  128<H>  3.4<L>   |  27  |  2.00<H>    Ca    8.8      22 Apr 2021 08:23  Phos  3.2     04-22  Mg     2.6     04-22    TPro  5.3<L>  /  Alb  2.2<L>  /  TBili  0.5  /  DBili  x   /  AST  34  /  ALT  43  /  AlkPhos  67  04-21        RADIOLOGY & ADDITIONAL STUDIES:  < from: CT Abdomen and Pelvis w/ IV Cont (04.19.21 @ 11:21) >    EXAM:  CT ABDOMEN AND PELVIS IC                          EXAM:  CT CHEST IC                            PROCEDURE DATE:  04/19/2021          INTERPRETATION:  CLINICAL INFORMATION: Sepsis    COMPARISON: None.    CONTRAST/COMPLICATIONS:  IV Contrast: Omnipaque 350  90 cc administered   10 cc discarded  Oral Contrast: NONE  Complications: None reported at time of study completion    PROCEDURE:  CT of the Chest, Abdomen and Pelvis was performed.  Sagittal and coronal reformats were performed.    FINDINGS:  CHEST:  LUNGS AND LARGE AIRWAYS: Endotracheal tube in place. Areas of mucoid impaction, especially lower lobe. Right upper, middle, and lower lobe nodular opacities, possibly infectious. Clear left lung.  PLEURA: No pleural effusion.  VESSELS:Atherosclerotic changes of thoracic aorta and coronary arteries.  HEART: Heart size is normal. No pericardial effusion.  MEDIASTINUM AND SCOTT: Small calcified mediastinal lymph nodes.  CHEST WALL AND LOWER NECK: Status post sternotomy. Left upper anterior chest wall cardiac device with leads to the heart.    ABDOMEN AND PELVIS:  LIVER: Within normal limits.  BILE DUCTS: Normal caliber.  GALLBLADDER: Gallbladder fundus adenomyomatosis.  SPLEEN: Within normal limits.  PANCREAS: Within normal limits.  ADRENALS: Within normal limits.  KIDNEYS/URETERS: Renal cortical scarring. Subcentimeter renal hypodensities, too small to further characterize. No hydronephrosis.    BLADDER: Wall thickening and inflammation.  REPRODUCTIVE ORGANS: Small calcified uterine fibroids.    BOWEL: Enteric tube coiled in distal esophagus. Moderate hiatal hernia. No bowel obstruction. Appendix is normal. Rectum distended by stool.  PERITONEUM: No ascites.  VESSELS: Atherosclerotic changes.  RETROPERITONEUM/LYMPH NODES:No lymphadenopathy.  ABDOMINAL WALL: Fat and fluid containing periumbilical hernia.  BONES: Degenerative changes. Right femur ORIF.    IMPRESSION:    1. Right lung nodular opacities, compatible with pneumonia.  2. Enteric tube coiled in distal esophagus. Recommend repositioning.  3. Rectum distended by stool. Otherwise no bowel obstruction.  4. Urinary bladder findings compatible with cystitis.    Findings were discussed with Dr. Freeman by telephone on 4/19/2021 at 1201 hours.            SUHAIL MCWILLIAMS; Attending Radiologist  This document has been electronically signed. Apr 19 2021 12:03PM    < end of copied text >     < from: CT Head No Cont (04.19.21 @ 11:14) >  EXAM:  CT BRAIN                            PROCEDURE DATE:  04/19/2021          INTERPRETATION:  CLINICAL Indications:  ams, patient has a pacemaker    COMPARISON: CT head dated 8/21/2016    TECHNIQUE: Noncontrast CT of the head. Multiplanar reformations are submitted.    FINDINGS: Streak artifact limits evaluation of the posterior fossa.  There is periventricular and subcortical white matter hypodensity without mass effect, nonspecific, likely representing mild chronic microvascular ischemic changes. There is no compelling evidence for an acute transcortical infarction. There is no evidence of mass, mass effect, midline shift or extra-axial fluid collection. The lateral ventricles and cortical sulci are age-appropriate in size and configuration. The patient is status post bilateral ocular lens replacement surgery. The orbits, mastoid air cells and visualized paranasal sinuses are otherwise unremarkable. The calvarium is intact. The patient is intubated. Right-sided NG tube noted. Consider follow-up CT as clinically warranted.    IMPRESSION:  Mild chronic microvascular changes without evidence of an acute transcortical infarction or hemorrhage.            NICKIE GALICIA MD; Attending Radiologist  This document has been electronically signed. Apr 19 2021 11:23AM    < end of copied text >    < from: Xray Chest 1 View-PORTABLE IMMEDIATE (Xray Chest 1 View-PORTABLE IMMEDIATE .) (04.19.21 @ 10:57) >    EXAM:  XR CHEST PORTABLE IMMED 1V                            PROCEDURE DATE:  04/19/2021          INTERPRETATION:  AP chest on April 19, 2021 at 10:26 AM.    COMPARISON: None available.    She was intubated.    Heart magnified by technique.    Sternotomy and left-sided pacemaker are noted.    There is no sense of active lung or pleural finding.    Endotracheal tube is in good position.    Nasogastric tube ends in the lower esophagus.    IMPRESSION: Endotracheal tube in good position. NG tube ends in the lower esophagus. Pacemaker.            NATE VALDEZ MD; Attending Radiologist  This document has been electronically signed. Apr 19 2021  2:07PM    < end of copied text >    PROTEIN CALORIE MALNUTRITION PRESENT: [ ] Yes [ ] No  [ ] PPSV2 < or = to 30% [ ] significant weight loss  [ ] poor nutritional intake [ ] catabolic state [ ] anasarca     Albumin, Serum: 2.2 g/dL (04-21-21 @ 04:53)      REFERRALS:   [ ]Chaplaincy  [ ] Hospice  [ ]Child Life  [ ]Social Work  [ ]Case management [ ]Holistic Therapy   Goals of Care Discussion Document:

## 2021-04-22 NOTE — PROGRESS NOTE ADULT - ASSESSMENT
Aspiration pneumonia, resolving  Generalized weakness, frailty, OMS  unclear if has PD or essential tremor as per neurologist Dr. Mascorro in past  Sorting this out may help, though she did not tolerate meds in past  Issues d/w daughter, Dr. Pavon  No CV contraindication to PEG if needed.

## 2021-04-22 NOTE — CONSULT NOTE ADULT - PROBLEM SELECTOR PROBLEM 3
Aspiration pneumonia of right lung, unspecified aspiration pneumonia type, unspecified part of lung Debility

## 2021-04-22 NOTE — PROGRESS NOTE ADULT - SUBJECTIVE AND OBJECTIVE BOX
INTERVAL HISTORY:  Daughter present  comfortable at 20 deg in bed  pain free  not tachypneic at rest      PAST MEDICAL & SURGICAL HISTORY:  CAD (coronary artery disease)  s/p CABG, PPM    HTN (hypertension)    HLD (hyperlipidemia)    Artificial pacemaker          MEDICATIONS:  MEDICATIONS  (STANDING):  atorvastatin 20 milliGRAM(s) Oral at bedtime  chlorhexidine 2% Cloths 1 Application(s) Topical <User Schedule>  furosemide   Injectable 20 milliGRAM(s) IV Push daily  heparin   Injectable 5000 Unit(s) SubCutaneous every 12 hours  piperacillin/tazobactam IVPB.. 3.375 Gram(s) IV Intermittent every 12 hours    MEDICATIONS  (PRN):      PHYSICAL EXAM:  T(F): 97.3 (04-22-21 @ 18:00), Max: 100.4 (04-22-21 @ 00:17)  HR: 58 (04-22-21 @ 18:00) (54 - 74)  BP: 180/75 (04-22-21 @ 20:17) (155/75 - 188/70)  RR: 20 (04-22-21 @ 18:00) (17 - 20)  SpO2: 96% (04-22-21 @ 18:00) (96% - 99%)  Wt(kg): --  I&O's Summary    21 Apr 2021 07:01  -  22 Apr 2021 07:00  --------------------------------------------------------  IN: 420 mL / OUT: 1625 mL / NET: -1205 mL    22 Apr 2021 07:01  -  22 Apr 2021 20:43  --------------------------------------------------------  IN: 320 mL / OUT: 800 mL / NET: -480 mL          PHYSICAL EXAM:  frail appearing  HEENT: sclera anicteric, conjunctiva pale  JVP normal  Carotids: normal upstroke  Lungs:  crackles bases  Cor: normal S1S2, no S3, rub  Abdomen:  normal bs, nontender, nondistended, no organomegaly  Ext:  no edema  generally weak, no focality, no tremor       OTHER: 	    LABS:	     CARDIAC MARKERS:                                  10.4   11.36 )-----------( 208      ( 22 Apr 2021 08:23 )             31.9     04-22    144  |  111<H>  |  37<H>  ----------------------------<  128<H>  3.4<L>   |  27  |  2.00<H>    Ca    8.8      22 Apr 2021 08:23  Phos  3.2     04-22  Mg     2.6     04-22    TPro  5.3<L>  /  Alb  2.2<L>  /  TBili  0.5  /  DBili  x   /  AST  34  /  ALT  43  /  AlkPhos  67  04-21      proBNP:   Lipid Profile:   HgA1c:   TSH:   Test                            Date  WBC         11.36               04-22 @ 08:23  RBC         3.44                04-22 @ 08:23  Hemoglobin  10.4                04-22 @ 08:23  Hematocrit  31.9                04-22 @ 08:23  Platelets   208                 04-22 @ 08:23  Creatinine  2.00                04-22 @ 08:23  Test                            Date  WBC         10.02               04-21 @ 04:53  RBC         3.20                04-21 @ 04:53  Hemoglobin  9.7                 04-21 @ 04:53  Hematocrit  29.5                04-21 @ 04:53  Platelets   170                 04-21 @ 04:53  Creatinine  1.99                04-21 @ 04:53

## 2021-04-22 NOTE — CONSULT NOTE ADULT - PROBLEM SELECTOR RECOMMENDATION 7
previously was followed by a neurologist who initially thought pt had Parkinson's but pt did not tolerate the medications and physician felt they were not beneficial and she was moreso experiencing essential tremors

## 2021-04-23 NOTE — PROGRESS NOTE ADULT - PROBLEM SELECTOR PLAN 3
progressively more dependent for care  has lived at [facility] for 2 years now, pt's daughter noted a change and more precipitous decline in memory and function in the past year possibly due to isolation during COVID lockdown

## 2021-04-23 NOTE — PROGRESS NOTE ADULT - SUBJECTIVE AND OBJECTIVE BOX
Patient is a 89y old  Female who presents with a chief complaint of Aspiration PNA, Respiratory Failure (22 Apr 2021 20:43)      OVERNIGHT EVENTS: none     REVIEW OF SYSTEMS: poor historian     MEDICATIONS  (STANDING):  atorvastatin 20 milliGRAM(s) Oral at bedtime  chlorhexidine 2% Cloths 1 Application(s) Topical <User Schedule>  furosemide   Injectable 20 milliGRAM(s) IV Push daily  heparin   Injectable 5000 Unit(s) SubCutaneous every 12 hours  piperacillin/tazobactam IVPB.. 3.375 Gram(s) IV Intermittent every 12 hours    MEDICATIONS  (PRN):      Allergies    sulfa drugs (Unknown)    Intolerances        T(F): 98.9 (04-23-21 @ 05:30), Max: 98.9 (04-23-21 @ 05:30)  HR: 67 (04-23-21 @ 05:30) (54 - 67)  BP: 159/75 (04-23-21 @ 05:30) (155/75 - 188/70)  RR: 20 (04-23-21 @ 05:30) (17 - 20)  SpO2: 95% (04-23-21 @ 05:30) (95% - 97%)  Wt(kg): --    PHYSICAL EXAM:  GENERAL: NAD  HEAD:  Atraumatic, Normocephalic  EYES: PERRLA, conjunctiva and sclera clear  ENMT: Moist mucous membranes  NECK: Supple, No JVD, Normal thyroid  NERVOUS SYSTEM:  Alert & Awake  CHEST/LUNG: Clear to percussion bilaterally;   HEART: Regular rate and rhythm;   ABDOMEN: Soft, Nontender, Nondistended; Bowel sounds present  EXTREMITIES:  no edema BL LE  SKIN: moist    LABS:                        10.7   12.12 )-----------( 237      ( 23 Apr 2021 07:11 )             34.6     04-23    147<H>  |  111<H>  |  42<H>  ----------------------------<  130<H>  3.5   |  30  |  1.82<H>    Ca    9.2      23 Apr 2021 07:11  Phos  3.2     04-22  Mg     2.6     04-22          Cultures;   CAPILLARY BLOOD GLUCOSE        Lipid panel:           RADIOLOGY & ADDITIONAL TESTS:    Imaging Personally Reviewed:  [x ] YES      Consultant(s) Notes Reviewed:  [x ] YES     Care Discussed with [x ] Consultants [X ] Patient [ ] Family  [x ]    [x ]  Other; RN

## 2021-04-23 NOTE — PROGRESS NOTE ADULT - SUBJECTIVE AND OBJECTIVE BOX
INTERVAL HPI/OVERNIGHT EVENTS: none    Code Status: full  Allergies    sulfa drugs (Unknown)    Intolerances    MEDICATIONS  (STANDING):  atorvastatin 20 milliGRAM(s) Oral at bedtime  chlorhexidine 2% Cloths 1 Application(s) Topical <User Schedule>  furosemide    Tablet 20 milliGRAM(s) Oral daily  heparin   Injectable 5000 Unit(s) SubCutaneous every 12 hours  piperacillin/tazobactam IVPB.. 3.375 Gram(s) IV Intermittent every 12 hours    MEDICATIONS  (PRN):      PRESENT SYMPTOMS: [ ]Unable to obtain due to poor mentation   Source if other than patient:  [ ]Family   [ ]Team     Pain (Impact on QOL):  denies pain  Location:  Severity:  Minimal acceptable level (0-10 scale):       Quality:       Onset:  Duration:  Aggravating factors:  Relieving Factors  Radiation:    Dyspnea:  Yes [ ] No [ x] - [ ]Mild [ ]Moderate [ ]Severe  Anxiety:    Yes [ ] No [ x] - [ ]Mild [ ]Moderate [ ]Severe  Fatigue:    Yes [ ] No [ x] - [ ]Mild [ ]Moderate [ ]Severe  Nausea:    Yes [ ] No [x ] - [ ]Mild [ ]Moderate [ ]Severe                         Loss of appetite: Yes [ ] No [x ] - [ ]Mild [ ]Moderate [ ]Severe             Constipation:  Yes [ ] No [x ] - [ ]Mild [ ]Moderate [ ]Severe  Grief: Yes [ ] No [ x]     PAIN AD Score:	  [email or web address]/[email or web address].pdf (Ctrl + left click to view)    Other Symptoms:  [ x]All other review of systems negative     Karnofsky Performance Score/Palliative Performance Status Version 2:       10  %    http://palliative.info/resource_material/[email or web address].pdf    PHYSICAL EXAM:  Vital Signs Last 24 Hrs  T(C): 37.2 (23 Apr 2021 05:30), Max: 37.2 (23 Apr 2021 05:30)  T(F): 98.9 (23 Apr 2021 05:30), Max: 98.9 (23 Apr 2021 05:30)  HR: 67 (23 Apr 2021 05:30) (54 - 67)  BP: 159/75 (23 Apr 2021 05:30) (155/75 - 188/70)  BP(mean): --  RR: 20 (23 Apr 2021 05:30) (17 - 20)  SpO2: 95% (23 Apr 2021 05:30) (95% - 97%) I&O's Summary    22 Apr 2021 07:01  -  23 Apr 2021 07:00  --------------------------------------------------------  IN: 320 mL / OUT: 1300 mL / NET: -980 mL         GENERAL:  [x ]Alert  [ x]Oriented x  2-3 [ ]Lethargic  [ ]Cachexia  [ ]Unarousable  [ x]Verbal  [ ]Non-Verbal  Behavioral:   [ ] Anxiety  [ ] Delirium [ ] Agitation [ ] Other  HEENT:  [ ]Normal   [x ]Dry mouth   [ ]ET Tube/Trach  [ ]Oral lesions  PULMONARY:   [x ]Clear [x ]Tachypnea  [ ]Audible excessive secretions   [ ]Rhonchi        [ ]Right [ ]Left [ ]Bilateral  [ ]Crackles        [ ]Right [ ]Left [ ]Bilateral  [ ]Wheezing     [ ]Right [ ]Left [ ]Bilateral  CARDIOVASCULAR:    [ ]Regular [ ]Irregular [ ]Tachy  [x ]Dalton [ ]Murmur [ ]Other  GASTROINTESTINAL:  [x ]Soft  [ ]Distended   [x ]+BS  [x ]Non tender [ ]Tender  [ ]PEG [ x]OGT/ NGT   Last BM: 4/22     GENITOURINARY:  [ ]Normal x ] Incontinent   [ ]Oliguria/Anuria   [ ]Maddox  MUSCULOSKELETAL:   [ ]Normal   [ ]Weakness  [ x]Bed/Wheelchair bound [ ]Edema  NEUROLOGIC:   [ ]No focal deficits  [x ] Cognitive impairment  [x ] Dysphagia [ ]Dysarthria [ ] Paresis [ ]Other   SKIN:   [ x]Normal   [ ]Pressure ulcer(s)  [ ]Rash    CRITICAL CARE:  [ ] Shock Present  [ ]Septic [ ]Cardiogenic [ ]Neurologic [ ]Hypovolemic  [ ]  Vasopressors [ ]  Inotropes   [ ] Respiratory failure present  [ ] Acute  [ ] Chronic [ ] Hypoxic  [ ] Hypercarbic [ ] Other  [ ] Other organ failure     LABS:                        10.7   12.12 )-----------( 237      ( 23 Apr 2021 07:11 )             34.6   04-23    147<H>  |  111<H>  |  42<H>  ----------------------------<  130<H>  3.5   |  30  |  1.82<H>    Ca    9.2      23 Apr 2021 07:11  Phos  3.2     04-22  Mg     2.6     04-22          RADIOLOGY & ADDITIONAL STUDIES:    Protein Calorie Malnutrition Present: [x ] yes [ ] no  [x ] PPSV2 < or = 30%  [ x] significant weight loss [x ] poor nutritional intake [ ] anasarca [x ] catabolic state Albumin, Serum: 2.2 g/dL (04-21-21 @ 04:53)      REFERRALS:   [ ]Chaplaincy  [ ] Hospice  [ ]Child Life  [ ]Social Work  [ ]Case management [ ]Holistic Therapy   Goals of Care Document:

## 2021-04-23 NOTE — PROGRESS NOTE ADULT - SUBJECTIVE AND OBJECTIVE BOX
INTERVAL HISTORY:  alert, comfortable  pain free    PAST MEDICAL & SURGICAL HISTORY:  CAD (coronary artery disease)  s/p CABG, PPM    HTN (hypertension)    HLD (hyperlipidemia)    Artificial pacemaker          MEDICATIONS:  MEDICATIONS  (STANDING):  atorvastatin 20 milliGRAM(s) Oral at bedtime  chlorhexidine 2% Cloths 1 Application(s) Topical <User Schedule>  furosemide    Tablet 20 milliGRAM(s) Oral daily  heparin   Injectable 5000 Unit(s) SubCutaneous every 12 hours  piperacillin/tazobactam IVPB.. 3.375 Gram(s) IV Intermittent every 12 hours    MEDICATIONS  (PRN):      PHYSICAL EXAM:  T(F): 97.3 (04-23-21 @ 12:35), Max: 98.9 (04-23-21 @ 05:30)  HR: 71 (04-23-21 @ 12:35) (58 - 71)  BP: 150/76 (04-23-21 @ 12:35) (150/76 - 188/70)  RR: 18 (04-23-21 @ 12:35) (18 - 20)  SpO2: 95% (04-23-21 @ 12:35) (95% - 96%)  Wt(kg): --  I&O's Summary    22 Apr 2021 07:01  -  23 Apr 2021 07:00  --------------------------------------------------------  IN: 320 mL / OUT: 1300 mL / NET: -980 mL    23 Apr 2021 07:01  -  23 Apr 2021 14:55  --------------------------------------------------------  IN: 0 mL / OUT: 900 mL / NET: -900 mL          PHYSICAL EXAM:    HEENT: sclera anicteric, conjunctiva normal  JVP normal  Carotids: normal upstroke  Lungs:  crackles at bases  Cor: normal S1S2, no S3, rub  Abdomen:  normal bs, nontender, nondistended, no organomegaly  Ext:  no edema  Neuro:  no focality, weak       OTHER: 	    LABS:	     CARDIAC MARKERS:                                  10.7   12.12 )-----------( 237      ( 23 Apr 2021 07:11 )             34.6     04-23    147<H>  |  111<H>  |  42<H>  ----------------------------<  130<H>  3.5   |  30  |  1.82<H>    Ca    9.2      23 Apr 2021 07:11  Phos  3.2     04-22  Mg     2.6     04-22        proBNP:   Lipid Profile:   HgA1c:   TSH:   Test                            Date  WBC         12.12               04-23 @ 07:11  RBC         3.62                04-23 @ 07:11  Hemoglobin  10.7                04-23 @ 07:11  Hematocrit  34.6                04-23 @ 07:11  Platelets   237                 04-23 @ 07:11  Creatinine  1.82                04-23 @ 07:11  Test                            Date  WBC         11.36               04-22 @ 08:23  RBC         3.44                04-22 @ 08:23  Hemoglobin  10.4                04-22 @ 08:23  Hematocrit  31.9                04-22 @ 08:23  Platelets   208                 04-22 @ 08:23  Creatinine  2.00                04-22 @ 08:23

## 2021-04-23 NOTE — PROGRESS NOTE ADULT - PROBLEM SELECTOR PLAN 8
yesterday, I spoke with patient's daughter/HCP Megan Lyons , we discussed overall condition of pt and potential avenues of care including PEG, comfort feeds, resuscitation and reintubation. She would like her mother to have a repeat swallow eval and then go from there. She did state that her mother 'wanted to live' and we discussed what type of life would be acceptable to her mother, Megan was not quite sure and needs some time to think things through as she and her mother hadn't had a conversation with these details. Will follow up with daughter after swallow reassessment.

## 2021-04-23 NOTE — PROGRESS NOTE ADULT - ASSESSMENT
stable on current rx  continue PT  consider Neuro evaluation re: additional meds for ? PD  usual CV meds as tolerated

## 2021-04-23 NOTE — PROGRESS NOTE ADULT - ASSESSMENT
HPI:  Patient is a 89yo F with PMHx CAD s/p CABG, HTN, HLD, Parkinsons. History collected from daughter. Patient was eating breakfast this morning at the table when she developed some choking and respiratory distress. Patients daughter called EMS. In the ED, noted to have O2 saturation in the 80s. Patient started on BIPAP for support. Patient then developed vomiting while on BIPAP. Patient with worsening mental status at the time and given likely aspiration was intubated for airway protection. ICU consulted for respiratory failure and airway management.  (19 Apr 2021 12:39)  ------------ As Above --------------------------- history obtained from chart and daughter  Consult called because she failed speech evaluation. Patient now w/o any melena, hematochezia, hematemesis, nausea, vomiting, abdominal pain, constipation, diarrhea, or change in bowel movements   tolerating NGT feeds  The daughter denies any recent melena, hematochezia, hematemesis, nausea, vomiting, abdominal pain, constipation, diarrhea, or change in bowel movements   last colonoscopy ? No abdominal surgery    Dysphagia - Patient failed speech evaluation. MS improving Discussed with family ~ 25 minutes yesterday  Awaiting repeat speech evaluation If no improvement, family will give consent for procedure Will hold ASA in the meantime.   == attempted to speak with speech dept

## 2021-04-23 NOTE — PROGRESS NOTE ADULT - ASSESSMENT
89yo F with PMHx CAD s/p CABG, HTN, HLD, Parkinsons who resides at [facility] admitted initially to ICU for acute respiratory failure after having a choking episode has since been extubated but remains with dysphagia and NGT in place. Palliative Care consulted for assistance with GOC specifically in regards to PEG discussion.

## 2021-04-23 NOTE — PROGRESS NOTE ADULT - SUBJECTIVE AND OBJECTIVE BOX
Patient is a 89y old  Female who presents with a chief complaint of Aspiration PNA, Respiratory Failure (23 Apr 2021 11:39)      HPI:  Patient is a 89yo F with PMHx CAD s/p CABG, HTN, HLD, Parkinsons. History collected from daughter. Patient was eating breakfast this morning at the table when she developed some choking and respiratory distress. Patients daughter called EMS. In the ED, noted to have O2 saturation in the 80s. Patient started on BIPAP for support. Patient then developed vomiting while on BIPAP. Patient with worsening mental status at the time and given likely aspiration was intubated for airway protection. ICU consulted for respiratory failure and airway management.  (19 Apr 2021 12:39)      INTERVAL HPI/OVERNIGHT EVENTS:  The patient denies melena, hematochezia, hematemesis, nausea, vomiting, abdominal pain, constipation, diarrhea, or change in bowel movements MS better. Tolerating NGT feeds    MEDICATIONS  (STANDING):  atorvastatin 20 milliGRAM(s) Oral at bedtime  chlorhexidine 2% Cloths 1 Application(s) Topical <User Schedule>  furosemide    Tablet 20 milliGRAM(s) Oral daily  heparin   Injectable 5000 Unit(s) SubCutaneous every 12 hours  piperacillin/tazobactam IVPB.. 3.375 Gram(s) IV Intermittent every 12 hours    MEDICATIONS  (PRN):      FAMILY HISTORY:      Allergies    sulfa drugs (Unknown)    Intolerances        PMH/PSH:  CAD (coronary artery disease)    HTN (hypertension)    HLD (hyperlipidemia)    Artificial pacemaker          REVIEW OF SYSTEMS:  CONSTITUTIONAL: No fever, weight loss,   RESPIRATORY: No cough, wheezing, chills or hemoptysis; No shortness of breath  CARDIOVASCULAR: No chest pain, palpitations, dizziness, or leg swelling  GASTROINTESTINAL: No abdominal or epigastric pain. No nausea, vomiting, or hematemesis; No diarrhea or constipation. No melena or hematochezia.    Vital Signs Last 24 Hrs  T(C): 36.3 (23 Apr 2021 12:35), Max: 37.2 (23 Apr 2021 05:30)  T(F): 97.3 (23 Apr 2021 12:35), Max: 98.9 (23 Apr 2021 05:30)  HR: 71 (23 Apr 2021 12:35) (54 - 71)  BP: 150/76 (23 Apr 2021 12:35) (150/76 - 188/70)  BP(mean): --  RR: 18 (23 Apr 2021 12:35) (18 - 20)  SpO2: 95% (23 Apr 2021 12:35) (95% - 96%)    PHYSICAL EXAM:  GENERAL: NAD, well-groomed, well-developed  NERVOUS SYSTEM:  Alert & Oriented X2 Good concentration;   CHEST/LUNG: Clear to percussion bilaterally; No rales, rhonchi, wheezing, or rubs  HEART: Regular rate and rhythm; No murmurs, rubs, or gallops  ABDOMEN: Soft, Nontender, Nondistended; Bowel sounds present      LAB                          10.7   12.12 )-----------( 237      ( 23 Apr 2021 07:11 )             34.6       CBC:  04-23 @ 07:11  WBC 12.12   Hgb 10.7   Hct 34.6   Plts 237  MCV 95.6  04-22 @ 08:23  WBC 11.36   Hgb 10.4   Hct 31.9   Plts 208  MCV 92.7  04-21 @ 04:53  WBC 10.02   Hgb 9.7   Hct 29.5   Plts 170  MCV 92.2  04-20 @ 03:32  WBC 10.06   Hgb 10.7   Hct 33.5   Plts 214  MCV 94.6  04-19 @ 20:53  WBC 7.46   Hgb 11.7   Hct 37.3   Plts 174  MCV 97.9  04-19 @ 15:01  WBC 8.78   Hgb 11.4   Hct 36.2   Plts 241  MCV 94.5  04-19 @ 10:22  WBC 14.04   Hgb 12.4   Hct 39.1   Plts 332  MCV 94.9      Chemistry:  04-23 @ 07:11  Na+ 147  K+ 3.5  Cl- 111  CO2 30  BUN 42  Cr 1.82     04-22 @ 08:23  Na+ 144  K+ 3.4  Cl- 111  CO2 27  BUN 37  Cr 2.00     04-21 @ 04:53  Na+ 143  K+ 3.8  Cl- 111  CO2 23  BUN 35  Cr 1.99     04-20 @ 03:32  Na+ 139  K+ 4.5  Cl- 108  CO2 22  BUN 40  Cr 1.94     04-19 @ 20:53  Na+ 139  K+ 4.2  Cl- 108  CO2 21  BUN 46  Cr 2.25     04-19 @ 15:01  Na+ 123  K+ 3.8  Cl- 89  CO2 21  BUN 45  Cr 2.12     04-19 @ 10:22  Na+ 138  K+ 3.9  Cl- 104  CO2 26  BUN 52  Cr 2.51         Glucose, Serum: 130 mg/dL (04-23 @ 07:11)  Glucose, Serum: 128 mg/dL (04-22 @ 08:23)  Glucose, Serum: 99 mg/dL (04-21 @ 04:53)  Glucose, Serum: 110 mg/dL (04-20 @ 03:32)  Glucose, Serum: 191 mg/dL (04-19 @ 20:53)  Glucose, Serum: 713 mg/dL (04-19 @ 15:01)  Glucose, Serum: 184 mg/dL (04-19 @ 10:22)      23 Apr 2021 07:11    147    |  111    |  42     ----------------------------<  130    3.5     |  30     |  1.82   22 Apr 2021 08:23    144    |  111    |  37     ----------------------------<  128    3.4     |  27     |  2.00   21 Apr 2021 04:53    143    |  111    |  35     ----------------------------<  99     3.8     |  23     |  1.99   20 Apr 2021 03:32    139    |  108    |  40     ----------------------------<  110    4.5     |  22     |  1.94   19 Apr 2021 20:53    139    |  108    |  46     ----------------------------<  191    4.2     |  21     |  2.25   19 Apr 2021 15:01    123    |  89     |  45     ----------------------------<  713    3.8     |  21     |  2.12   19 Apr 2021 10:22    138    |  104    |  52     ----------------------------<  184    3.9     |  26     |  2.51     Ca    9.2        23 Apr 2021 07:11  Ca    8.8        22 Apr 2021 08:23  Ca    8.1        21 Apr 2021 04:53  Ca    7.9        20 Apr 2021 03:32  Ca    7.9        19 Apr 2021 20:53  Ca    7.4        19 Apr 2021 15:01  Ca    9.0        19 Apr 2021 10:22  Phos  3.2       22 Apr 2021 08:23  Phos  3.2       21 Apr 2021 04:53  Phos  2.9       20 Apr 2021 03:32  Phos  3.2       19 Apr 2021 15:01  Mg     2.6       22 Apr 2021 08:23  Mg     2.3       21 Apr 2021 04:53  Mg     2.1       20 Apr 2021 03:32  Mg     2.2       19 Apr 2021 15:01    TPro  5.3    /  Alb  2.2    /  TBili  0.5    /  DBili  x      /  AST  34     /  ALT  43     /  AlkPhos  67     21 Apr 2021 04:53  TPro  5.4    /  Alb  2.5    /  TBili  0.7    /  DBili  x      /  AST  38     /  ALT  54     /  AlkPhos  76     20 Apr 2021 03:32  TPro  5.6    /  Alb  2.9    /  TBili  1.3    /  DBili  x      /  AST  37     /  ALT  59     /  AlkPhos  88     19 Apr 2021 15:01  TPro  7.8    /  Alb  3.7    /  TBili  0.4    /  DBili  x      /  AST  39     /  ALT  71     /  AlkPhos  123    19 Apr 2021 10:22              CAPILLARY BLOOD GLUCOSE              RADIOLOGY & ADDITIONAL TESTS:    Imaging Personally Reviewed:  [ ] YES  [ ] NO    Consultant(s) Notes Reviewed:  [ ] YES  [ ] NO    Care Discussed with Consultants/Other Providers [ ] YES  [ ] NO

## 2021-04-23 NOTE — PROGRESS NOTE ADULT - ASSESSMENT
89 F with CAD s/p CABG, HTN, HLD and Parkinson's disease presents with severe sepsis without shock and acute hypoxemic respiratory failure requiring intubation and mechanical ventilation. Suspect aspiration pneumonia vs UTI / cystitis. On empiric with Zosyn.    severe sepsis without shock  acute hypoxemic respiratory failure requiring intubation and mechanical ventilation.   Suspect aspiration pneumonia vs UTI / cystitis.   Acute metabolic encephalopathy  Debility  Lacitic acidosis  -On empiric with Zosyn. ivf  -Mental status continues to improve. Responds to simple yes/no questions.    hypertension and mild edema.  CAD s/p CABG, HLD  -cont on current care    Dysphagia  -NPO for now given aspiration risk. Follow up with speech pathology.  -GI following, family wants rpt SLP    DONITA improved  Hypokalemia- replaced, monitor   Parkinsons  -supportive care, PT, Sinemet    PPX: HSQ  Full Code, Palliative care  daughter Megan Lyons [phone]. 89 F with CAD s/p CABG, HTN, HLD and Parkinson's disease presents with severe sepsis without shock and acute hypoxemic respiratory failure requiring intubation and mechanical ventilation. Suspect aspiration pneumonia vs UTI / cystitis. On empiric with Zosyn.    severe sepsis without shock  acute hypoxemic respiratory failure requiring intubation and mechanical ventilation.   Suspect aspiration pneumonia vs UTI / cystitis.   Acute metabolic encephalopathy  Debility  Lacitic acidosis  -On empiric with Zosyn. ivf  -Mental status continues to improve. Responds to simple yes/no questions.    hypertension and mild edema.  CAD s/p CABG, HLD  -cont on current care  -TTE;     1. Left ventricular ejection fraction, by visual estimation, is 55 to 60%.   2. Mildly enlarged left atrium.   3. Mild mitral annular calcification.   4. No evidence of mitral valve regurgitation.   5. Mild tricuspid regurgitation.   6. Moderate aortic regurgitation.   7. Sclerotic aortic valve with normal opening.   8. Mild pulmonic valve regurgitation.    Dysphagia  -NPO for now given aspiration risk. Follow up with speech pathology.  -GI following, family wants rpt SLP    DONITA improved  Hypokalemia- replaced, monitor   Parkinsons  -supportive care, PT, Sinemet    PPX: HSQ  Full Code, Palliative care  daughter Megan Lyons [phone].

## 2021-04-23 NOTE — PROGRESS NOTE ADULT - PROBLEM SELECTOR PLAN 4
remains on Zosyn.   no 100% way to prevent aspiration = explained this to daughter as did ICU physician previously

## 2021-04-24 NOTE — PROGRESS NOTE ADULT - ASSESSMENT
89 F with CAD s/p CABG, HTN, HLD and Parkinson's disease presents with severe sepsis without shock and acute hypoxemic respiratory failure requiring intubation and mechanical ventilation. Suspect aspiration pneumonia vs UTI / cystitis. On empiric with Zosyn.    severe sepsis without shock  acute hypoxemic respiratory failure requiring intubation and mechanical ventilation.   Suspect aspiration pneumonia vs UTI / cystitis.   Acute metabolic encephalopathy  Debility  Lacitic acidosis  -On empiric with Zosyn. ivf  -Mental status continues to improve. Responds to simple yes/no questions.    hypertension and mild edema.  CAD s/p CABG, HLD  -cont on current care  -TTE;     1. Left ventricular ejection fraction, by visual estimation, is 55 to 60%.   2. Mildly enlarged left atrium.   3. Mild mitral annular calcification.   4. No evidence of mitral valve regurgitation.   5. Mild tricuspid regurgitation.   6. Moderate aortic regurgitation.   7. Sclerotic aortic valve with normal opening.   8. Mild pulmonic valve regurgitation.    Dysphagia  -NPO for now given aspiration risk. Follow up with speech pathology.  -GI following, family wants rpt SLP    DONITA improved  Hypokalemia- replaced, monitor   Parkinsons  -supportive care, PT, Sinemet    PPX: HSQ  Full Code, Palliative care  daughter Megan Lyons [phone].

## 2021-04-24 NOTE — CHART NOTE - NSCHARTNOTEFT_GEN_A_CORE
Hospitalist Medicine NP    Called by RN for NG tube insertion. Pt is a 89y old Female admitted for Aspiration PNA . NG tube order noted on chart.    T(C): 36.8 (04-24-21 @ 16:00), Max: 37 (04-24-21 @ 05:00)  T(F): 98.2 (04-24-21 @ 16:00), Max: 98.6 (04-24-21 @ 05:00)  HR: 73 (04-24-21 @ 16:00) (62 - 87)  BP: 178/74 (04-24-21 @ 16:00) (156/93 - 178/74)  RR: 16 (04-24-21 @ 16:00) (16 - 18)  SpO2: 97% (04-24-21 @ 16:00) (94% - 97%)    NGT inserted into   [R] nare. Pt tolerated procedure well. Placement verified via auscultation. KUB ordered to confirm placement.     IMAN Trujillo C.

## 2021-04-24 NOTE — PROGRESS NOTE ADULT - SUBJECTIVE AND OBJECTIVE BOX
Patient is a 89y old  Female who presents with a chief complaint of Aspiration PNA, Respiratory Failure (23 Apr 2021 14:54)      OVERNIGHT EVENTS:  none    REVIEW OF SYSTEMS: poor historian    MEDICATIONS  (STANDING):  atorvastatin 20 milliGRAM(s) Oral at bedtime  chlorhexidine 2% Cloths 1 Application(s) Topical <User Schedule>  furosemide    Tablet 20 milliGRAM(s) Oral daily  heparin   Injectable 5000 Unit(s) SubCutaneous every 12 hours  piperacillin/tazobactam IVPB.. 3.375 Gram(s) IV Intermittent every 12 hours    MEDICATIONS  (PRN):      Allergies    sulfa drugs (Unknown)    Intolerances        T(F): 98.6 (04-24-21 @ 05:00), Max: 98.6 (04-24-21 @ 05:00)  HR: 63 (04-24-21 @ 05:00) (63 - 87)  BP: 168/69 (04-24-21 @ 05:00) (150/76 - 169/65)  RR: 18 (04-24-21 @ 05:00) (18 - 18)  SpO2: 97% (04-24-21 @ 05:00) (94% - 97%)  Wt(kg): --    PHYSICAL EXAM:  GENERAL: NAD  HEAD:  Atraumatic, Normocephalic  EYES: PERRLA, conjunctiva and sclera clear  ENMT: Moist mucous membranes  NECK: Supple, No JVD, Normal thyroid  NERVOUS SYSTEM:  Alert & Awake  CHEST/LUNG: Clear to percussion bilaterally;   HEART: Regular rate and rhythm;   ABDOMEN: Soft, Nontender, Nondistended; Bowel sounds present  EXTREMITIES:  no edema BL LE  SKIN: moist    LABS:                        10.7   9.15  )-----------( 229      ( 24 Apr 2021 07:26 )             34.4     04-24    148<H>  |  112<H>  |  46<H>  ----------------------------<  130<H>  3.6   |  33<H>  |  1.75<H>    Ca    9.2      24 Apr 2021 07:26  Phos  3.2     04-22  Mg     2.6     04-22          Cultures;   CAPILLARY BLOOD GLUCOSE        Lipid panel:           RADIOLOGY & ADDITIONAL TESTS:    Imaging Personally Reviewed:  [x ] YES      Consultant(s) Notes Reviewed:  [x ] YES     Care Discussed with [x ] Consultants [X ] Patient [ ] Family  [x ]    [x ]  Other; RN

## 2021-04-25 NOTE — PROGRESS NOTE ADULT - SUBJECTIVE AND OBJECTIVE BOX
Patient is a 89y old  Female who presents with a chief complaint of Aspiration PNA, Respiratory Failure (24 Apr 2021 08:07)      OVERNIGHT EVENTS:    REVIEW OF SYSTEMS: poor historian    MEDICATIONS  (STANDING):  atorvastatin 20 milliGRAM(s) Oral at bedtime  chlorhexidine 2% Cloths 1 Application(s) Topical <User Schedule>  dextrose 5% + sodium chloride 0.45%. 1000 milliLiter(s) (80 mL/Hr) IV Continuous <Continuous>  furosemide    Tablet 20 milliGRAM(s) Oral daily  heparin   Injectable 5000 Unit(s) SubCutaneous every 12 hours  piperacillin/tazobactam IVPB.. 3.375 Gram(s) IV Intermittent every 12 hours    MEDICATIONS  (PRN):      Allergies    sulfa drugs (Unknown)    Intolerances        T(F): 98.4 (04-25-21 @ 07:07), Max: 98.4 (04-25-21 @ 07:07)  HR: 65 (04-25-21 @ 07:07) (61 - 73)  BP: 174/52 (04-25-21 @ 07:07) (165/64 - 178/74)  RR: 18 (04-25-21 @ 07:07) (16 - 18)  SpO2: 95% (04-25-21 @ 07:07) (94% - 98%)  Wt(kg): --    PHYSICAL EXAM:  GENERAL: NAD  HEAD:  Atraumatic, Normocephalic  EYES: PERRLA, conjunctiva and sclera clear  ENMT: Moist mucous membranes  NECK: Supple, No JVD, Normal thyroid  NERVOUS SYSTEM:  Alert & Awake  CHEST/LUNG: Clear to percussion bilaterally;   HEART: Regular rate and rhythm;   ABDOMEN: Soft, Nontender, Nondistended; Bowel sounds present  EXTREMITIES:  no edema BL LE  SKIN: moist    LABS:                        10.6   9.55  )-----------( 216      ( 25 Apr 2021 07:24 )             33.7     04-25    152<H>  |  115<H>  |  46<H>  ----------------------------<  130<H>  3.5   |  32<H>  |  1.63<H>    Ca    8.7      25 Apr 2021 07:24          Cultures;   CAPILLARY BLOOD GLUCOSE        Lipid panel:           RADIOLOGY & ADDITIONAL TESTS:    Imaging Personally Reviewed:  [x ] YES      Consultant(s) Notes Reviewed:  [x ] YES     Care Discussed with [x ] Consultants [X ] Patient [ ] Family  [x ]    [x ]  Other; RN

## 2021-04-25 NOTE — PROGRESS NOTE ADULT - ASSESSMENT
89 F with CAD s/p CABG, HTN, HLD and Parkinson's disease presents with severe sepsis without shock and acute hypoxemic respiratory failure requiring intubation and mechanical ventilation. Suspect aspiration pneumonia vs UTI / cystitis. On empiric with Zosyn.    severe sepsis without shock  acute hypoxemic respiratory failure requiring intubation and mechanical ventilation.   Suspect aspiration pneumonia vs UTI / cystitis.   Acute metabolic encephalopathy  Debility  Lacitic acidosis  -On empiric with Zosyn. ivf  -Mental status continues to improve. Responds to simple yes/no questions.    hypertension and mild edema.  CAD s/p CABG, HLD  -cont on current care  -TTE;     1. Left ventricular ejection fraction, by visual estimation, is 55 to 60%.   2. Mildly enlarged left atrium.   3. Mild mitral annular calcification.   4. No evidence of mitral valve regurgitation.   5. Mild tricuspid regurgitation.   6. Moderate aortic regurgitation.   7. Sclerotic aortic valve with normal opening.   8. Mild pulmonic valve regurgitation.    Dysphagia  -NPO for now given aspiration risk. Follow up with speech pathology.  -GI following, family wants rpt SLP  -NGT feeding     DONITA improved  Hypokalemia- replaced, monitor   Parkinsons  -supportive care, PT, Sinemet    PPX: HSQ  Full Code, Palliative care  daughter Megan Lyons [phone].

## 2021-04-26 NOTE — SWALLOW BEDSIDE ASSESSMENT ADULT - SLP GENERAL OBSERVATIONS
pt seen bedside on 02 sat NC 2L alert, pleasantly confused and oriented to self and place. pt answered simple questions for assessment and followed directions for oral [facility] exam. speech intelligibly fair to good for 3-4word utterances, noted increased WOB during po trials. pt seen bedside on 02 sat NC 2L alert, pleasantly confused and oriented to self and place. pt answered simple questions for assessment and followed directions for oral [facility] exam. speech intelligibly fair to good for 3-4word utterances, noted increased WOB during po trials and with verbalizations.

## 2021-04-26 NOTE — PROGRESS NOTE ADULT - SUBJECTIVE AND OBJECTIVE BOX
Patient is a 89y old  Female who presents with a chief complaint of Aspiration PNA, Respiratory Failure (26 Apr 2021 11:21)      OVERNIGHT EVENTS: none     REVIEW OF SYSTEMS: poor historian     MEDICATIONS  (STANDING):  atorvastatin 20 milliGRAM(s) Oral at bedtime  chlorhexidine 2% Cloths 1 Application(s) Topical <User Schedule>  dextrose 5%. 1000 milliLiter(s) (100 mL/Hr) IV Continuous <Continuous>  furosemide    Tablet 20 milliGRAM(s) Oral daily  heparin   Injectable 5000 Unit(s) SubCutaneous every 12 hours  metoprolol tartrate 12.5 milliGRAM(s) Enteral Tube two times a day    MEDICATIONS  (PRN):      Allergies    sulfa drugs (Unknown)    Intolerances        T(F): 97.9 (04-26-21 @ 10:42), Max: 98.2 (04-25-21 @ 17:00)  HR: 60 (04-26-21 @ 10:42) (60 - 73)  BP: 137/58 (04-26-21 @ 10:42) (134/74 - 187/72)  RR: 18 (04-26-21 @ 10:42) (18 - 18)  SpO2: 96% (04-26-21 @ 10:42) (96% - 99%)  Wt(kg): --    PHYSICAL EXAM:  GENERAL: NAD  HEAD:  Atraumatic, Normocephalic  EYES: PERRLA, conjunctiva and sclera clear  ENMT: Moist mucous membranes  NECK: Supple, No JVD, Normal thyroid  NERVOUS SYSTEM:  Alert & Awake  CHEST/LUNG: Clear to percussion bilaterally;   HEART: Regular rate and rhythm;   ABDOMEN: Soft, Nontender, Nondistended; Bowel sounds present  EXTREMITIES:  no edema BL LE  SKIN: moist    LABS:                        10.4   8.26  )-----------( 217      ( 26 Apr 2021 06:44 )             33.6     04-26    150<H>  |  115<H>  |  44<H>  ----------------------------<  124<H>  3.3<L>   |  31  |  1.50<H>    Ca    8.4<L>      26 Apr 2021 06:44          Cultures;   CAPILLARY BLOOD GLUCOSE        Lipid panel:           RADIOLOGY & ADDITIONAL TESTS:    Imaging Personally Reviewed:  [x ] YES      Consultant(s) Notes Reviewed:  [x ] YES     Care Discussed with [x ] Consultants [X ] Patient [ ] Family  [x ]    [x ]  Other; RN

## 2021-04-26 NOTE — SWALLOW BEDSIDE ASSESSMENT ADULT - SLP PRECAUTIONS/LIMITATIONS: VISION
Problem: Patient Care Overview  Goal: Plan of Care Review  Outcome: Ongoing (interventions implemented as appropriate)  Flowsheets (Taken 8/17/2020 1984)  Progress: improving  Plan of Care Reviewed With: patient  Outcome Summary: pt trans to EOB min assist, performed BLE exercises at EOB , sit-stand min assist, pt stood x 2, unable to move RLE and maintain NWB LLE, trans back to bed min-mod assist, pt would benefit from SNF     
within functional limits
pt able to visually locate to SLP/within functional limits

## 2021-04-26 NOTE — PROGRESS NOTE ADULT - ASSESSMENT
HPI:  Patient is a 89yo F with PMHx CAD s/p CABG, HTN, HLD, Parkinsons. History collected from daughter. Patient was eating breakfast this morning at the table when she developed some choking and respiratory distress. Patients daughter called EMS. In the ED, noted to have O2 saturation in the 80s. Patient started on BIPAP for support. Patient then developed vomiting while on BIPAP. Patient with worsening mental status at the time and given likely aspiration was intubated for airway protection. ICU consulted for respiratory failure and airway management.  (19 Apr 2021 12:39)  ------------ As Above --------------------------- history obtained from chart and daughter  Consult called because she failed speech evaluation. Patient now w/o any melena, hematochezia, hematemesis, nausea, vomiting, abdominal pain, constipation, diarrhea, or change in bowel movements   tolerating NGT feeds  The daughter denies any recent melena, hematochezia, hematemesis, nausea, vomiting, abdominal pain, constipation, diarrhea, or change in bowel movements   last colonoscopy ? No abdominal surgery    Dysphagia - Patient passed speech evaluation. Tolerated NGT feeds. Order for PO feeds in computer   == Spoke with Mingo Sweet and daughter.   == Will follow on a PRN basis HPI:  Patient is a 89yo F with PMHx CAD s/p CABG, HTN, HLD, Parkinsons. History collected from daughter. Patient was eating breakfast this morning at the table when she developed some choking and respiratory distress. Patients daughter called EMS. In the ED, noted to have O2 saturation in the 80s. Patient started on BIPAP for support. Patient then developed vomiting while on BIPAP. Patient with worsening mental status at the time and given likely aspiration was intubated for airway protection. ICU consulted for respiratory failure and airway management.  (19 Apr 2021 12:39)  ------------ As Above --------------------------- history obtained from chart and daughter  Consult called because she failed speech evaluation. Patient now w/o any melena, hematochezia, hematemesis, nausea, vomiting, abdominal pain, constipation, diarrhea, or change in bowel movements   tolerating NGT feeds  The daughter denies any recent melena, hematochezia, hematemesis, nausea, vomiting, abdominal pain, constipation, diarrhea, or change in bowel movements   last colonoscopy ? No abdominal surgery    Dysphagia - Patient passed speech evaluation. Tolerated NGT feeds. Order for PO feeds in computer Restart ASA 81  == Spoke with Mingoalfa Franzf and daughter.   == Will follow on a PRN basis

## 2021-04-26 NOTE — PROGRESS NOTE ADULT - SUBJECTIVE AND OBJECTIVE BOX
Patient is a 89y old  Female who presents with a chief complaint of Aspiration PNA, Respiratory Failure (26 Apr 2021 13:26)      HPI:  Patient is a 89yo F with PMHx CAD s/p CABG, HTN, HLD, Parkinsons. History collected from daughter. Patient was eating breakfast this morning at the table when she developed some choking and respiratory distress. Patients daughter called EMS. In the ED, noted to have O2 saturation in the 80s. Patient started on BIPAP for support. Patient then developed vomiting while on BIPAP. Patient with worsening mental status at the time and given likely aspiration was intubated for airway protection. ICU consulted for respiratory failure and airway management.  (19 Apr 2021 12:39)      INTERVAL HPI/OVERNIGHT EVENTS:    MEDICATIONS  (STANDING):  atorvastatin 20 milliGRAM(s) Oral at bedtime  chlorhexidine 2% Cloths 1 Application(s) Topical <User Schedule>  dextrose 5%. 1000 milliLiter(s) (100 mL/Hr) IV Continuous <Continuous>  furosemide    Tablet 20 milliGRAM(s) Oral daily  heparin   Injectable 5000 Unit(s) SubCutaneous every 12 hours  metoprolol tartrate 12.5 milliGRAM(s) Enteral Tube two times a day    MEDICATIONS  (PRN):      FAMILY HISTORY:      Allergies    sulfa drugs (Unknown)    Intolerances        PMH/PSH:  CAD (coronary artery disease)    HTN (hypertension)    HLD (hyperlipidemia)    Artificial pacemaker          REVIEW OF SYSTEMS:  CONSTITUTIONAL: No fever, weight loss, or fatigue  EYES: No eye pain, visual disturbances, or discharge  ENMT:  No difficulty hearing, tinnitus, vertigo; No sinus or throat pain  NECK: No pain or stiffness  BREASTS: No pain, masses, or nipple discharge  RESPIRATORY: No cough, wheezing, chills or hemoptysis; No shortness of breath  CARDIOVASCULAR: No chest pain, palpitations, dizziness, or leg swelling  GASTROINTESTINAL: No abdominal or epigastric pain. No nausea, vomiting, or hematemesis; No diarrhea or constipation. No melena or hematochezia.  GENITOURINARY: No dysuria, frequency, hematuria, or incontinence  NEUROLOGICAL: No headaches, memory loss, loss of strength, numbness, or tremors  SKIN: No itching, burning, rashes, or lesions   LYMPH NODES: No enlarged glands  ENDOCRINE: No heat or cold intolerance; No hair loss  MUSCULOSKELETAL: No joint pain or swelling; No muscle, back, or extremity pain  PSYCHIATRIC: No depression, anxiety, mood swings, or difficulty sleeping  HEME/LYMPH: No easy bruising, or bleeding gums  ALLERGY AND IMMUNOLOGIC: No hives or eczema    Vital Signs Last 24 Hrs  T(C): 36.6 (26 Apr 2021 10:42), Max: 36.8 (25 Apr 2021 17:00)  T(F): 97.9 (26 Apr 2021 10:42), Max: 98.2 (25 Apr 2021 17:00)  HR: 60 (26 Apr 2021 10:42) (60 - 73)  BP: 137/58 (26 Apr 2021 10:42) (134/74 - 187/72)  BP(mean): --  RR: 18 (26 Apr 2021 10:42) (18 - 18)  SpO2: 96% (26 Apr 2021 10:42) (96% - 99%)    PHYSICAL EXAM:  GENERAL: NAD, well-groomed, well-developed  HEAD:  Atraumatic, Normocephalic  EYES: EOMI, PERRLA, conjunctiva and sclera clear  ENMT: No tonsillar erythema, exudates, or enlargement; Moist mucous membranes, Good dentition, No lesions  NECK: Supple, No JVD, Normal thyroid  NERVOUS SYSTEM:  Alert & Oriented X3, Good concentration; Motor Strength 5/5 B/L upper and lower extremities; DTRs 2+ intact and symmetric  CHEST/LUNG: Clear to percussion bilaterally; No rales, rhonchi, wheezing, or rubs  HEART: Regular rate and rhythm; No murmurs, rubs, or gallops  ABDOMEN: Soft, Nontender, Nondistended; Bowel sounds present  EXTREMITIES:  2+ Peripheral Pulses, No clubbing, cyanosis, or edema  LYMPH: No lymphadenopathy noted  SKIN: No rashes or lesions    LAB                          10.4   8.26  )-----------( 217      ( 26 Apr 2021 06:44 )             33.6       CBC:  04-26 @ 06:44  WBC 8.26   Hgb 10.4   Hct 33.6   Plts 217  MCV 95.7  04-25 @ 07:24  WBC 9.55   Hgb 10.6   Hct 33.7   Plts 216  MCV 94.9  04-24 @ 07:26  WBC 9.15   Hgb 10.7   Hct 34.4   Plts 229  MCV 96.4  04-23 @ 07:11  WBC 12.12   Hgb 10.7   Hct 34.6   Plts 237  MCV 95.6  04-22 @ 08:23  WBC 11.36   Hgb 10.4   Hct 31.9   Plts 208  MCV 92.7  04-21 @ 04:53  WBC 10.02   Hgb 9.7   Hct 29.5   Plts 170  MCV 92.2  04-20 @ 03:32  WBC 10.06   Hgb 10.7   Hct 33.5   Plts 214  MCV 94.6  04-19 @ 20:53  WBC 7.46   Hgb 11.7   Hct 37.3   Plts 174  MCV 97.9  04-19 @ 15:01  WBC 8.78   Hgb 11.4   Hct 36.2   Plts 241  MCV 94.5      Chemistry:  04-26 @ 06:44  Na+ 150  K+ 3.3  Cl- 115  CO2 31  BUN 44  Cr 1.50     04-25 @ 07:24  Na+ 152  K+ 3.5  Cl- 115  CO2 32  BUN 46  Cr 1.63     04-24 @ 07:26  Na+ 148  K+ 3.6  Cl- 112  CO2 33  BUN 46  Cr 1.75     04-23 @ 07:11  Na+ 147  K+ 3.5  Cl- 111  CO2 30  BUN 42  Cr 1.82     04-22 @ 08:23  Na+ 144  K+ 3.4  Cl- 111  CO2 27  BUN 37  Cr 2.00     04-21 @ 04:53  Na+ 143  K+ 3.8  Cl- 111  CO2 23  BUN 35  Cr 1.99     04-20 @ 03:32  Na+ 139  K+ 4.5  Cl- 108  CO2 22  BUN 40  Cr 1.94     04-19 @ 20:53  Na+ 139  K+ 4.2  Cl- 108  CO2 21  BUN 46  Cr 2.25     04-19 @ 15:01  Na+ 123  K+ 3.8  Cl- 89  CO2 21  BUN 45  Cr 2.12         Glucose, Serum: 124 mg/dL (04-26 @ 06:44)  Glucose, Serum: 130 mg/dL (04-25 @ 07:24)  Glucose, Serum: 130 mg/dL (04-24 @ 07:26)  Glucose, Serum: 130 mg/dL (04-23 @ 07:11)  Glucose, Serum: 128 mg/dL (04-22 @ 08:23)  Glucose, Serum: 99 mg/dL (04-21 @ 04:53)  Glucose, Serum: 110 mg/dL (04-20 @ 03:32)  Glucose, Serum: 191 mg/dL (04-19 @ 20:53)  Glucose, Serum: 713 mg/dL (04-19 @ 15:01)      26 Apr 2021 06:44    150    |  115    |  44     ----------------------------<  124    3.3     |  31     |  1.50   25 Apr 2021 07:24    152    |  115    |  46     ----------------------------<  130    3.5     |  32     |  1.63   24 Apr 2021 07:26    148    |  112    |  46     ----------------------------<  130    3.6     |  33     |  1.75   23 Apr 2021 07:11    147    |  111    |  42     ----------------------------<  130    3.5     |  30     |  1.82   22 Apr 2021 08:23    144    |  111    |  37     ----------------------------<  128    3.4     |  27     |  2.00   21 Apr 2021 04:53    143    |  111    |  35     ----------------------------<  99     3.8     |  23     |  1.99   20 Apr 2021 03:32    139    |  108    |  40     ----------------------------<  110    4.5     |  22     |  1.94     Ca    8.4        26 Apr 2021 06:44  Ca    8.7        25 Apr 2021 07:24  Ca    9.2        24 Apr 2021 07:26  Ca    9.2        23 Apr 2021 07:11  Ca    8.8        22 Apr 2021 08:23  Ca    8.1        21 Apr 2021 04:53  Ca    7.9        20 Apr 2021 03:32  Phos  3.2       22 Apr 2021 08:23  Phos  3.2       21 Apr 2021 04:53  Phos  2.9       20 Apr 2021 03:32  Phos  3.2       19 Apr 2021 15:01  Mg     2.6       22 Apr 2021 08:23  Mg     2.3       21 Apr 2021 04:53  Mg     2.1       20 Apr 2021 03:32  Mg     2.2       19 Apr 2021 15:01    TPro  5.3    /  Alb  2.2    /  TBili  0.5    /  DBili  x      /  AST  34     /  ALT  43     /  AlkPhos  67     21 Apr 2021 04:53  TPro  5.4    /  Alb  2.5    /  TBili  0.7    /  DBili  x      /  AST  38     /  ALT  54     /  AlkPhos  76     20 Apr 2021 03:32  TPro  5.6    /  Alb  2.9    /  TBili  1.3    /  DBili  x      /  AST  37     /  ALT  59     /  AlkPhos  88     19 Apr 2021 15:01              CAPILLARY BLOOD GLUCOSE              RADIOLOGY & ADDITIONAL TESTS:    Imaging Personally Reviewed:  [ ] YES  [ ] NO    Consultant(s) Notes Reviewed:  [ ] YES  [ ] NO    Care Discussed with Consultants/Other Providers [ ] YES  [ ] NO Patient is a 89y old  Female who presents with a chief complaint of Aspiration PNA, Respiratory Failure (26 Apr 2021 13:26)      HPI:  Patient is a 89yo F with PMHx CAD s/p CABG, HTN, HLD, Parkinsons. History collected from daughter. Patient was eating breakfast this morning at the table when she developed some choking and respiratory distress. Patients daughter called EMS. In the ED, noted to have O2 saturation in the 80s. Patient started on BIPAP for support. Patient then developed vomiting while on BIPAP. Patient with worsening mental status at the time and given likely aspiration was intubated for airway protection. ICU consulted for respiratory failure and airway management.  (19 Apr 2021 12:39)      INTERVAL HPI/OVERNIGHT EVENTS:  The nurse denies melena, hematochezia, hematemesis, nausea, vomiting, abdominal pain, constipation, diarrhea, or change in bowel movements Tolerating NGT feeds. Passed speech evaluation    MEDICATIONS  (STANDING):  atorvastatin 20 milliGRAM(s) Oral at bedtime  chlorhexidine 2% Cloths 1 Application(s) Topical <User Schedule>  dextrose 5%. 1000 milliLiter(s) (100 mL/Hr) IV Continuous <Continuous>  furosemide    Tablet 20 milliGRAM(s) Oral daily  heparin   Injectable 5000 Unit(s) SubCutaneous every 12 hours  metoprolol tartrate 12.5 milliGRAM(s) Enteral Tube two times a day    MEDICATIONS  (PRN):      FAMILY HISTORY:      Allergies    sulfa drugs (Unknown)    Intolerances        PMH/PSH:  CAD (coronary artery disease)    HTN (hypertension)    HLD (hyperlipidemia)    Artificial pacemaker          REVIEW OF SYSTEMS:  CONSTITUTIONAL: No fever, weight loss,   RESPIRATORY: No cough, wheezing, chills or hemoptysis; No shortness of breath  CARDIOVASCULAR: No chest pain, palpitations, dizziness, or leg swelling  GASTROINTESTINAL: No abdominal or epigastric pain. No nausea, vomiting, or hematemesis; No diarrhea or constipation. No melena or hematochezia.      Vital Signs Last 24 Hrs  T(C): 36.6 (26 Apr 2021 10:42), Max: 36.8 (25 Apr 2021 17:00)  T(F): 97.9 (26 Apr 2021 10:42), Max: 98.2 (25 Apr 2021 17:00)  HR: 60 (26 Apr 2021 10:42) (60 - 73)  BP: 137/58 (26 Apr 2021 10:42) (134/74 - 187/72)  BP(mean): --  RR: 18 (26 Apr 2021 10:42) (18 - 18)  SpO2: 96% (26 Apr 2021 10:42) (96% - 99%)    PHYSICAL EXAM:  GENERAL: NAD, well-groomed, well-developed  CHEST/LUNG: Clear to percussion bilaterally; No rales, rhonchi, wheezing, or rubs  HEART: Regular rate and rhythm; No murmurs, rubs, or gallops  ABDOMEN: Soft, Nontender, Nondistended; Bowel sounds present      LAB                          10.4   8.26  )-----------( 217      ( 26 Apr 2021 06:44 )             33.6       CBC:  04-26 @ 06:44  WBC 8.26   Hgb 10.4   Hct 33.6   Plts 217  MCV 95.7  04-25 @ 07:24  WBC 9.55   Hgb 10.6   Hct 33.7   Plts 216  MCV 94.9  04-24 @ 07:26  WBC 9.15   Hgb 10.7   Hct 34.4   Plts 229  MCV 96.4  04-23 @ 07:11  WBC 12.12   Hgb 10.7   Hct 34.6   Plts 237  MCV 95.6  04-22 @ 08:23  WBC 11.36   Hgb 10.4   Hct 31.9   Plts 208  MCV 92.7  04-21 @ 04:53  WBC 10.02   Hgb 9.7   Hct 29.5   Plts 170  MCV 92.2  04-20 @ 03:32  WBC 10.06   Hgb 10.7   Hct 33.5   Plts 214  MCV 94.6  04-19 @ 20:53  WBC 7.46   Hgb 11.7   Hct 37.3   Plts 174  MCV 97.9  04-19 @ 15:01  WBC 8.78   Hgb 11.4   Hct 36.2   Plts 241  MCV 94.5      Chemistry:  04-26 @ 06:44  Na+ 150  K+ 3.3  Cl- 115  CO2 31  BUN 44  Cr 1.50     04-25 @ 07:24  Na+ 152  K+ 3.5  Cl- 115  CO2 32  BUN 46  Cr 1.63     04-24 @ 07:26  Na+ 148  K+ 3.6  Cl- 112  CO2 33  BUN 46  Cr 1.75     04-23 @ 07:11  Na+ 147  K+ 3.5  Cl- 111  CO2 30  BUN 42  Cr 1.82     04-22 @ 08:23  Na+ 144  K+ 3.4  Cl- 111  CO2 27  BUN 37  Cr 2.00     04-21 @ 04:53  Na+ 143  K+ 3.8  Cl- 111  CO2 23  BUN 35  Cr 1.99     04-20 @ 03:32  Na+ 139  K+ 4.5  Cl- 108  CO2 22  BUN 40  Cr 1.94     04-19 @ 20:53  Na+ 139  K+ 4.2  Cl- 108  CO2 21  BUN 46  Cr 2.25     04-19 @ 15:01  Na+ 123  K+ 3.8  Cl- 89  CO2 21  BUN 45  Cr 2.12         Glucose, Serum: 124 mg/dL (04-26 @ 06:44)  Glucose, Serum: 130 mg/dL (04-25 @ 07:24)  Glucose, Serum: 130 mg/dL (04-24 @ 07:26)  Glucose, Serum: 130 mg/dL (04-23 @ 07:11)  Glucose, Serum: 128 mg/dL (04-22 @ 08:23)  Glucose, Serum: 99 mg/dL (04-21 @ 04:53)  Glucose, Serum: 110 mg/dL (04-20 @ 03:32)  Glucose, Serum: 191 mg/dL (04-19 @ 20:53)  Glucose, Serum: 713 mg/dL (04-19 @ 15:01)      26 Apr 2021 06:44    150    |  115    |  44     ----------------------------<  124    3.3     |  31     |  1.50   25 Apr 2021 07:24    152    |  115    |  46     ----------------------------<  130    3.5     |  32     |  1.63   24 Apr 2021 07:26    148    |  112    |  46     ----------------------------<  130    3.6     |  33     |  1.75   23 Apr 2021 07:11    147    |  111    |  42     ----------------------------<  130    3.5     |  30     |  1.82   22 Apr 2021 08:23    144    |  111    |  37     ----------------------------<  128    3.4     |  27     |  2.00   21 Apr 2021 04:53    143    |  111    |  35     ----------------------------<  99     3.8     |  23     |  1.99   20 Apr 2021 03:32    139    |  108    |  40     ----------------------------<  110    4.5     |  22     |  1.94     Ca    8.4        26 Apr 2021 06:44  Ca    8.7        25 Apr 2021 07:24  Ca    9.2        24 Apr 2021 07:26  Ca    9.2        23 Apr 2021 07:11  Ca    8.8        22 Apr 2021 08:23  Ca    8.1        21 Apr 2021 04:53  Ca    7.9        20 Apr 2021 03:32  Phos  3.2       22 Apr 2021 08:23  Phos  3.2       21 Apr 2021 04:53  Phos  2.9       20 Apr 2021 03:32  Phos  3.2       19 Apr 2021 15:01  Mg     2.6       22 Apr 2021 08:23  Mg     2.3       21 Apr 2021 04:53  Mg     2.1       20 Apr 2021 03:32  Mg     2.2       19 Apr 2021 15:01    TPro  5.3    /  Alb  2.2    /  TBili  0.5    /  DBili  x      /  AST  34     /  ALT  43     /  AlkPhos  67     21 Apr 2021 04:53  TPro  5.4    /  Alb  2.5    /  TBili  0.7    /  DBili  x      /  AST  38     /  ALT  54     /  AlkPhos  76     20 Apr 2021 03:32  TPro  5.6    /  Alb  2.9    /  TBili  1.3    /  DBili  x      /  AST  37     /  ALT  59     /  AlkPhos  88     19 Apr 2021 15:01              CAPILLARY BLOOD GLUCOSE              RADIOLOGY & ADDITIONAL TESTS:    Imaging Personally Reviewed:  [ ] YES  [ ] NO    Consultant(s) Notes Reviewed:  [ ] YES  [ ] NO    Care Discussed with Consultants/Other Providers [ ] YES  [ ] NO

## 2021-04-26 NOTE — PROGRESS NOTE ADULT - PROBLEM SELECTOR PLAN 8
patient's daughter/HCP Megan Lyons , spoke today with patient's daughter/HCP Megan Lyons , she stated she would want to deal with these issues on case by case basis that if her mother was to have events requiring ventilator support or other critical interventions she would want them done and then deal with situation after attempts made to remedy the situation or in effect, save her mother's life. Megan was happy to learn her mother passed her swallow eval and that is one less thing she needs to make a decision about and mentioned she understands that no one lives forever but wants her mother to have the best chances and have preserved quality. She is hopeful her mother can regain her ambulatory ability at rehab which is the plan from here.

## 2021-04-26 NOTE — PROGRESS NOTE ADULT - SUBJECTIVE AND OBJECTIVE BOX
INTERVAL HPI/OVERNIGHT EVENTS:    Code Status:   Allergies    sulfa drugs (Unknown)    Intolerances    MEDICATIONS  (STANDING):  atorvastatin 20 milliGRAM(s) Oral at bedtime  chlorhexidine 2% Cloths 1 Application(s) Topical <User Schedule>  dextrose 5% + sodium chloride 0.45%. 1000 milliLiter(s) (80 mL/Hr) IV Continuous <Continuous>  furosemide    Tablet 20 milliGRAM(s) Oral daily  heparin   Injectable 5000 Unit(s) SubCutaneous every 12 hours  metoprolol tartrate 12.5 milliGRAM(s) Enteral Tube two times a day    MEDICATIONS  (PRN):      PRESENT SYMPTOMS: [ ]Unable to obtain due to poor mentation   Source if other than patient:  [ ]Family   [ ]Team     Pain (Impact on QOL):    Location:  Severity:  Minimal acceptable level (0-10 scale):       Quality:       Onset:  Duration:  Aggravating factors:  Relieving Factors  Radiation:    Dyspnea:  Yes [ ] No [ ] - [ ]Mild [ ]Moderate [ ]Severe  Anxiety:    Yes [ ] No [ ] - [ ]Mild [ ]Moderate [ ]Severe  Fatigue:    Yes [ ] No [ ] - [ ]Mild [ ]Moderate [ ]Severe  Nausea:    Yes [ ] No [ ] - [ ]Mild [ ]Moderate [ ]Severe                         Loss of appetite: Yes [ ] No [ ] - [ ]Mild [ ]Moderate [ ]Severe             Constipation:  Yes [ ] No [ ] - [ ]Mild [ ]Moderate [ ]Severe  Grief: Yes [ ] No [ ]     PAIN AD Score:	  [email or web address] (Ctrl + left click to view)    Other Symptoms:  [ ]All other review of systems negative     Karnofsky Performance Score/Palliative Performance Status Version 2:         %    http://palliative.info/resource_material/[email or web address].pdf    PHYSICAL EXAM:  Vital Signs Last 24 Hrs  T(C): 36.6 (26 Apr 2021 10:42), Max: 36.8 (25 Apr 2021 17:00)  T(F): 97.9 (26 Apr 2021 10:42), Max: 98.2 (25 Apr 2021 17:00)  HR: 60 (26 Apr 2021 10:42) (60 - 75)  BP: 137/58 (26 Apr 2021 10:42) (134/74 - 187/72)  BP(mean): --  RR: 18 (26 Apr 2021 10:42) (18 - 18)  SpO2: 96% (26 Apr 2021 10:42) (96% - 99%) I&O's Summary    25 Apr 2021 07:01  -  26 Apr 2021 07:00  --------------------------------------------------------  IN: 1380 mL / OUT: 550 mL / NET: 830 mL         GENERAL:  [ ]Alert  [ ]Oriented x   [ ]Lethargic  [ ]Cachexia  [ ]Unarousable  [ ]Verbal  [ ]Non-Verbal  Behavioral:   [ ] Anxiety  [ ] Delirium [ ] Agitation [ ] Other  HEENT:  [ ]Normal   [ ]Dry mouth   [ ]ET Tube/Trach  [ ]Oral lesions  PULMONARY:   [ ]Clear [ ]Tachypnea  [ ]Audible excessive secretions   [ ]Rhonchi        [ ]Right [ ]Left [ ]Bilateral  [ ]Crackles        [ ]Right [ ]Left [ ]Bilateral  [ ]Wheezing     [ ]Right [ ]Left [ ]Bilateral  CARDIOVASCULAR:    [ ]Regular [ ]Irregular [ ]Tachy  [ ]Dalton [ ]Murmur [ ]Other  GASTROINTESTINAL:  [ ]Soft  [ ]Distended   [ ]+BS  [ ]Non tender [ ]Tender  [ ]PEG [ ]OGT/ NGT   Last BM:      GENITOURINARY:  [ ]Normal [ ] Incontinent   [ ]Oliguria/Anuria   [ ]Maddox  MUSCULOSKELETAL:   [ ]Normal   [ ]Weakness  [ ]Bed/Wheelchair bound [ ]Edema  NEUROLOGIC:   [ ]No focal deficits  [ ] Cognitive impairment  [ ] Dysphagia [ ]Dysarthria [ ] Paresis [ ]Other   SKIN:   [ ]Normal   [ ]Pressure ulcer(s)  [ ]Rash    CRITICAL CARE:  [ ] Shock Present  [ ]Septic [ ]Cardiogenic [ ]Neurologic [ ]Hypovolemic  [ ]  Vasopressors [ ]  Inotropes   [ ] Respiratory failure present  [ ] Acute  [ ] Chronic [ ] Hypoxic  [ ] Hypercarbic [ ] Other  [ ] Other organ failure     LABS:                        10.4   8.26  )-----------( 217      ( 26 Apr 2021 06:44 )             33.6   04-26    150<H>  |  115<H>  |  44<H>  ----------------------------<  124<H>  3.3<L>   |  31  |  1.50<H>    Ca    8.4<L>      26 Apr 2021 06:44          RADIOLOGY & ADDITIONAL STUDIES:    Protein Calorie Malnutrition Present: [ ] yes [ ] no  [ ] PPSV2 < or = 30%  [ ] significant weight loss [ ] poor nutritional intake [ ] anasarca [ ] catabolic state Albumin, Serum: 2.2 g/dL (04-21-21 @ 04:53)      REFERRALS:   [ ]Chaplaincy  [ ] Hospice  [ ]Child Life  [ ]Social Work  [ ]Case management [ ]Holistic Therapy   Goals of Care Document:  INTERVAL HPI/OVERNIGHT EVENTS: passed swallow eval    Code Status: full  Allergies    sulfa drugs (Unknown)    Intolerances    MEDICATIONS  (STANDING):  atorvastatin 20 milliGRAM(s) Oral at bedtime  chlorhexidine 2% Cloths 1 Application(s) Topical <User Schedule>  dextrose 5% + sodium chloride 0.45%. 1000 milliLiter(s) (80 mL/Hr) IV Continuous <Continuous>  furosemide    Tablet 20 milliGRAM(s) Oral daily  heparin   Injectable 5000 Unit(s) SubCutaneous every 12 hours  metoprolol tartrate 12.5 milliGRAM(s) Enteral Tube two times a day    MEDICATIONS  (PRN):      PRESENT SYMPTOMS: [ ]Unable to obtain due to poor mentation   Source if other than patient:  [ ]Family   [ ]Team     Pain (Impact on QOL):  denies pain   Location:  Severity:  Minimal acceptable level (0-10 scale):       Quality:       Onset:  Duration:  Aggravating factors:  Relieving Factors  Radiation:    Dyspnea:  Yes [ ] No [ ] - [ ]Mild [ ]Moderate [ ]Severe  Anxiety:    Yes [ ] No [ ] - [ ]Mild [ ]Moderate [ ]Severe  Fatigue:    Yes [ ] No [ ] - [ ]Mild [ ]Moderate [ ]Severe  Nausea:    Yes [ ] No [ ] - [ ]Mild [ ]Moderate [ ]Severe                         Loss of appetite: Yes [ ] No [ ] - [ ]Mild [ ]Moderate [ ]Severe             Constipation:  Yes [ ] No [ ] - [ ]Mild [ ]Moderate [ ]Severe  Grief: Yes [ ] No [ ]     PAIN AD Score:	  [email or web address]/painad.[email or web address] (Ctrl + left click to view)    Other Symptoms:  [ ]All other review of systems negative     Karnofsky Performance Score/Palliative Performance Status Version 2:         %    http://palliative.info/resource_material/[email or web address].pdf    PHYSICAL EXAM:  Vital Signs Last 24 Hrs  T(C): 36.6 (26 Apr 2021 10:42), Max: 36.8 (25 Apr 2021 17:00)  T(F): 97.9 (26 Apr 2021 10:42), Max: 98.2 (25 Apr 2021 17:00)  HR: 60 (26 Apr 2021 10:42) (60 - 75)  BP: 137/58 (26 Apr 2021 10:42) (134/74 - 187/72)  BP(mean): --  RR: 18 (26 Apr 2021 10:42) (18 - 18)  SpO2: 96% (26 Apr 2021 10:42) (96% - 99%) I&O's Summary    25 Apr 2021 07:01  -  26 Apr 2021 07:00  --------------------------------------------------------  IN: 1380 mL / OUT: 550 mL / NET: 830 mL         GENERAL:  [ ]Alert  [ ]Oriented x   [ ]Lethargic  [ ]Cachexia  [ ]Unarousable  [ ]Verbal  [ ]Non-Verbal  Behavioral:   [ ] Anxiety  [ ] Delirium [ ] Agitation [ ] Other  HEENT:  [ ]Normal   [ ]Dry mouth   [ ]ET Tube/Trach  [ ]Oral lesions  PULMONARY:   [ ]Clear [ ]Tachypnea  [ ]Audible excessive secretions   [ ]Rhonchi        [ ]Right [ ]Left [ ]Bilateral  [ ]Crackles        [ ]Right [ ]Left [ ]Bilateral  [ ]Wheezing     [ ]Right [ ]Left [ ]Bilateral  CARDIOVASCULAR:    [ ]Regular [ ]Irregular [ ]Tachy  [ ]Dalton [ ]Murmur [ ]Other  GASTROINTESTINAL:  [ ]Soft  [ ]Distended   [ ]+BS  [ ]Non tender [ ]Tender  [ ]PEG [ ]OGT/ NGT   Last BM:      GENITOURINARY:  [ ]Normal [ ] Incontinent   [ ]Oliguria/Anuria   [ ]Maddox  MUSCULOSKELETAL:   [ ]Normal   [ ]Weakness  [ ]Bed/Wheelchair bound [ ]Edema  NEUROLOGIC:   [ ]No focal deficits  [ ] Cognitive impairment  [ ] Dysphagia [ ]Dysarthria [ ] Paresis [ ]Other   SKIN:   [ ]Normal   [ ]Pressure ulcer(s)  [ ]Rash    CRITICAL CARE:  [ ] Shock Present  [ ]Septic [ ]Cardiogenic [ ]Neurologic [ ]Hypovolemic  [ ]  Vasopressors [ ]  Inotropes   [ ] Respiratory failure present  [ ] Acute  [ ] Chronic [ ] Hypoxic  [ ] Hypercarbic [ ] Other  [ ] Other organ failure     LABS:                        10.4   8.26  )-----------( 217      ( 26 Apr 2021 06:44 )             33.6   04-26    150<H>  |  115<H>  |  44<H>  ----------------------------<  124<H>  3.3<L>   |  31  |  1.50<H>    Ca    8.4<L>      26 Apr 2021 06:44          RADIOLOGY & ADDITIONAL STUDIES:    Protein Calorie Malnutrition Present: [ ] yes [ ] no  [ ] PPSV2 < or = 30%  [ ] significant weight loss [ ] poor nutritional intake [ ] anasarca [ ] catabolic state Albumin, Serum: 2.2 g/dL (04-21-21 @ 04:53)      REFERRALS:   [ ]Chaplaincy  [ ] Hospice  [ ]Child Life  [ ]Social Work  [ ]Case management [ ]Holistic Therapy   Goals of Care Document:  INTERVAL HPI/OVERNIGHT EVENTS: passed swallow eval    Code Status: full  Allergies    sulfa drugs (Unknown)    Intolerances    MEDICATIONS  (STANDING):  atorvastatin 20 milliGRAM(s) Oral at bedtime  chlorhexidine 2% Cloths 1 Application(s) Topical <User Schedule>  dextrose 5% + sodium chloride 0.45%. 1000 milliLiter(s) (80 mL/Hr) IV Continuous <Continuous>  furosemide    Tablet 20 milliGRAM(s) Oral daily  heparin   Injectable 5000 Unit(s) SubCutaneous every 12 hours  metoprolol tartrate 12.5 milliGRAM(s) Enteral Tube two times a day    MEDICATIONS  (PRN):      PRESENT SYMPTOMS: [ ]Unable to obtain due to poor mentation   Source if other than patient:  [ ]Family   [ ]Team     Pain (Impact on QOL):  denies pain   Location:  Severity:  Minimal acceptable level (0-10 scale):       Quality:       Onset:  Duration:  Aggravating factors:  Relieving Factors  Radiation:    Dyspnea:  Yes [ ] No [x ] - [ ]Mild [ ]Moderate [ ]Severe  Anxiety:    Yes [ ] No [x ] - [ ]Mild [ ]Moderate [ ]Severe  Fatigue:    Yes [ ] No [x ] - [ ]Mild [ ]Moderate [ ]Severe  Nausea:    Yes [ ] No [ x] - [ ]Mild [ ]Moderate [ ]Severe                         Loss of appetite: Yes [ ] No [x ] - [ ]Mild [ ]Moderate [ ]Severe             Constipation:  Yes [ ] No [ x] - [ ]Mild [ ]Moderate [ ]Severe  Grief: Yes [ ] No [x ]     PAIN AD Score:	  [email or web address].[email or web address]/painad.pdf (Ctrl + left click to view)    Other Symptoms:  [x ]All other review of systems negative     Karnofsky Performance Score/Palliative Performance Status Version 2:      20   %    http://palliative.info/resource_material/[email or web address].pdf    PHYSICAL EXAM:  Vital Signs Last 24 Hrs  T(C): 36.6 (26 Apr 2021 10:42), Max: 36.8 (25 Apr 2021 17:00)  T(F): 97.9 (26 Apr 2021 10:42), Max: 98.2 (25 Apr 2021 17:00)  HR: 60 (26 Apr 2021 10:42) (60 - 75)  BP: 137/58 (26 Apr 2021 10:42) (134/74 - 187/72)  BP(mean): --  RR: 18 (26 Apr 2021 10:42) (18 - 18)  SpO2: 96% (26 Apr 2021 10:42) (96% - 99%) I&O's Summary    25 Apr 2021 07:01  -  26 Apr 2021 07:00  --------------------------------------------------------  IN: 1380 mL / OUT: 550 mL / NET: 830 mL         GENERAL:  [x ]Alert  [x ]Oriented x 3  [ ]Lethargic  [ ]Cachexia  [ ]Unarousable  [x ]Verbal  [ ]Non-Verbal  Behavioral:   [ ] Anxiety  [ ] Delirium [ ] Agitation [ ] Other  HEENT:  [ ]Normal   [x ]Dry mouth   [ ]ET Tube/Trach  [ ]Oral lesions  PULMONARY:   [ ]Clear [x ]Tachypnea  [ ]Audible excessive secretions   [ ]Rhonchi        [ ]Right [ ]Left [ ]Bilateral  [ ]Crackles        [ ]Right [ ]Left [ ]Bilateral  [ ]Wheezing     [ ]Right [ ]Left [ ]Bilateral  CARDIOVASCULAR:    [ ]Regular [ ]Irregular [ ]Tachy  [x ]Dalton [ ]Murmur [ ]Other  GASTROINTESTINAL:  [x ]Soft  [ ]Distended   [x ]+BS  [ x]Non tender [ ]Tender  [ ]PEG [x ]OGT/ NGT   Last BM: 4/26     GENITOURINARY:  [ ]Normal [x ] Incontinent   [ ]Oliguria/Anuria   [ ]Maddox  MUSCULOSKELETAL:   [ ]Normal   [x ]Weakness  [ ]Bed/Wheelchair bound [ ]Edema  NEUROLOGIC:   [ ]No focal deficits  [ x] Cognitive impairment  [x ] Dysphagia [ ]Dysarthria [ ] Paresis [ ]Other   SKIN:   [x ]Normal   [ ]Pressure ulcer(s)  [ ]Rash    CRITICAL CARE:  [ ] Shock Present  [ ]Septic [ ]Cardiogenic [ ]Neurologic [ ]Hypovolemic  [ ]  Vasopressors [ ]  Inotropes   [ ] Respiratory failure present  [ ] Acute  [ ] Chronic [ ] Hypoxic  [ ] Hypercarbic [ ] Other  [ ] Other organ failure     LABS:                        10.4   8.26  )-----------( 217      ( 26 Apr 2021 06:44 )             33.6   04-26    150<H>  |  115<H>  |  44<H>  ----------------------------<  124<H>  3.3<L>   |  31  |  1.50<H>    Ca    8.4<L>      26 Apr 2021 06:44          RADIOLOGY & ADDITIONAL STUDIES:    Protein Calorie Malnutrition Present: [ x] yes [ ] no  [x ] PPSV2 < or = 30%  [x ] significant weight loss [x ] poor nutritional intake [ ] anasarca [x ] catabolic state Albumin, Serum: 2.2 g/dL (04-21-21 @ 04:53)      REFERRALS:   [ ]Chaplaincy  [ ] Hospice  [ ]Child Life  [x ]Social Work  [ x]Case management [ ]Holistic Therapy   Goals of Care Document:

## 2021-04-26 NOTE — SWALLOW BEDSIDE ASSESSMENT ADULT - SWALLOW EVAL: RECOMMENDED FEEDING/EATING TECHNIQUES
oral hygiene
allow for swallow between intakes/maintain upright posture during/after eating for 30 mins/oral hygiene/small sips/bites

## 2021-04-26 NOTE — SWALLOW BEDSIDE ASSESSMENT ADULT - COMMENTS
CT head no contrast 4/19/2021 IMPRESSION:  Mild chronic microvascular changes without evidence of an acute transcortical infarction or hemorrhage.    CXR 4/21/2021 IMPRESSION: Increasing basilar effusions particularly on the right. NG tube inserted.

## 2021-04-26 NOTE — PROGRESS NOTE ADULT - ASSESSMENT
89 F with CAD s/p CABG, HTN, HLD and Parkinson's disease presents with severe sepsis without shock and acute hypoxemic respiratory failure requiring intubation and mechanical ventilation. Suspect aspiration pneumonia vs UTI / cystitis. On empiric with Zosyn.    severe sepsis without shock  acute hypoxemic respiratory failure requiring intubation and mechanical ventilation.   Suspect aspiration pneumonia and UTI / cystitis.   Acute metabolic encephalopathy  Debility  Lacitic acidosis  -Completed Zosyn. ivf  -Mental status continues to improve. Responds to simple yes/no questions.  -50,000 - 99,000 CFU/mL Escherichia coli    hypertension and mild edema.  CAD s/p CABG, HLD  -cont on current care  -TTE;     1. Left ventricular ejection fraction, by visual estimation, is 55 to 60%.   2. Mildly enlarged left atrium.   3. Mild mitral annular calcification.   4. No evidence of mitral valve regurgitation.   5. Mild tricuspid regurgitation.   6. Moderate aortic regurgitation.   7. Sclerotic aortic valve with normal opening.   8. Mild pulmonic valve regurgitation.    Dysphagia  -seen by speech pathology.- Dysphagia 1-puree with nectar thick liquids  -GI following  -aspiration precaution maintained     DONITA improved  Hypokalemia- replaced, monitor     Parkinson's Disease  -supportive care, PT, Sinemet    PPX: HSQ  Full Code, Palliative care  PT Eval- Family prefers [facility]  daughter Megan Lyons [phone].

## 2021-04-26 NOTE — PROGRESS NOTE ADULT - PROBLEM SELECTOR PLAN 3
progressively more dependent for care  has lived at [facility] for 2 years now, pt's daughter noted a change and more precipitous decline in memory and function in the past year possibly due to isolation during COVID lockdown progressively more dependent for care  has lived at [facility] for 2 years now, pt's daughter interested in patient going to rehab to regain ambulation

## 2021-04-26 NOTE — PROGRESS NOTE ADULT - CONVERSATION DETAILS
spoke with pt's daughter at bedside regarding pt's aspiration, intubation, possible recurrence of these events, if there is any consideration for If this were to happen again or if she would require resuscitation. Megan wants her mother to have any interventions needed and then make decisions based on her condition at that time. Pt to remain full code.

## 2021-04-26 NOTE — SWALLOW BEDSIDE ASSESSMENT ADULT - SWALLOW EVAL: DIAGNOSIS
oropharyngeal dysphagia marked by intermittent oral holding with puree, suspected timely trigger for pharyngeal swallow and adequate laryngeal elevation, multiple swallows needed to clear bolus with thin liquids. oropharyngeal dysphagia marked by intermittent oral holding with puree, suspected timely trigger for pharyngeal swallow and adequate laryngeal elevation, multiple swallows needed to clear bolus with thin liquids. No overt signs of aspiration with consistencies trialed.

## 2021-04-26 NOTE — SWALLOW BEDSIDE ASSESSMENT ADULT - H & P REVIEW
Patient is a 89yo F with PMHx CAD s/p CABG, HTN, HLD, Parkinsons. History collected from daughter. Patient was eating breakfast this morning at the table when she developed some choking and respiratory distress. Patients daughter called EMS. In the ED, noted to have O2 saturation in the 80s. Patient started on BIPAP for support. Patient then developed vomiting while on BIPAP. Patient with worsening mental status at the time and given likely aspiration was intubated for airway protection. ICU consulted for respiratory failure and airway management. pt intubatred on admission 4/19 and extubated 4/20/2021/yes
yes

## 2021-04-26 NOTE — SWALLOW BEDSIDE ASSESSMENT ADULT - SLP PERTINENT HISTORY OF CURRENT PROBLEM
pt denied difficulty eating and swallowing pt denied difficulty eating and swallowing. swallow eval completed on 4/21/2021 at which time NPO was recommended. see report for details.

## 2021-04-26 NOTE — PROGRESS NOTE ADULT - ASSESSMENT
89yo F with PMHx CAD s/p CABG, HTN, HLD, Parkinsons who resides at [facility] admitted initially to ICU for acute respiratory failure after having a choking episode has since been extubated but remains with dysphagia and NGT in place. Palliative Care consulted for assistance with GOC specifically in regards to PEG discussion.

## 2021-04-26 NOTE — PROGRESS NOTE ADULT - PROBLEM SELECTOR PLAN 4
remains on Zosyn.   no 100% way to prevent aspiration = explained this to daughter as did ICU physician previously remains on Zosyn.   no 100% way to prevent aspiration = explained this to daughter as did ICU physician previously  spoke with daughter today again, she stated she would want to deal with these issues as they come- she was happy to not have to make any decisions regarding feeding but this may be the first of many visits to hospital for aspiration and I counseled her to be cautiously optimistic at this point.

## 2021-04-27 NOTE — PROGRESS NOTE ADULT - ASSESSMENT
89 F with CAD s/p CABG, HTN, HLD and Parkinson's disease presents with severe sepsis without shock and acute hypoxemic respiratory failure requiring intubation and mechanical ventilation. Suspect aspiration pneumonia vs UTI / cystitis. On empiric with Zosyn.    severe sepsis without shock  acute hypoxemic respiratory failure requiring intubation and mechanical ventilation.   Suspect aspiration pneumonia and UTI / cystitis.   Acute metabolic encephalopathy  Debility  Lacitic acidosis  -Completed Zosyn. ivf  -Mental status continues to improve. Responds to simple yes/no questions.  -50,000 - 99,000 CFU/mL Escherichia coli    hypertension and mild edema.  CAD s/p CABG, HLD  -cont on current care  -TTE;     1. Left ventricular ejection fraction, by visual estimation, is 55 to 60%.   2. Mildly enlarged left atrium.   3. Mild mitral annular calcification.   4. No evidence of mitral valve regurgitation.   5. Mild tricuspid regurgitation.   6. Moderate aortic regurgitation.   7. Sclerotic aortic valve with normal opening.   8. Mild pulmonic valve regurgitation.    Dysphagia  -seen by speech pathology.- Dysphagia 1-puree with nectar thick liquids  -GI following  -aspiration precaution maintained     DONITA improved  Hypokalemia- replaced, monitor     Parkinson's Disease  -supportive care, PT, Sinemet    PPX: HSQ  Full Code, Palliative care  PT Eval- [facility], pending Auth  daughter Megan Lyons [phone].

## 2021-04-27 NOTE — PROGRESS NOTE ADULT - PROBLEM SELECTOR PROBLEM 4
Aspiration pneumonia of right lung, unspecified aspiration pneumonia type, unspecified part of lung

## 2021-04-27 NOTE — PROGRESS NOTE ADULT - ASSESSMENT
89yo F with PMHx CAD s/p CABG, HTN, HLD, Parkinsons who resides at [facility] admitted initially to ICU for acute respiratory failure after having a choking episode has since been extubated but remains with dysphagia - pt passed re-eval for speech and had PO diet, daughter request pt remain full code with plan for rehab.

## 2021-04-27 NOTE — PROGRESS NOTE ADULT - PROBLEM SELECTOR PLAN 2
remains with NGT feeds on- pending repeat swallow bebeal  explained to daughter via phone that PEG would not completely remove risk of aspiration nor would it prolong life  yesterday when spoke to patient she stated she did not want feeding tube, today is more amenable to it saying "it would only be temporary" attempted to explain that at this point in her life it would likely be permanent- do not think pt has the mental capacity to decide- daughter awaiting repeat speech pathology evaluation to determine next steps
seen by speech pathology - dysphagia diet
seen by speech pathology - dysphagia diet recommended

## 2021-04-27 NOTE — PROGRESS NOTE ADULT - ASSESSMENT
gradual progress  remains frail but well compensated from resp viewpoint  no significant tremor now  PT, OT, short term rehab  usual cv meds, resume antihypertensives as necessary  wean off O2

## 2021-04-27 NOTE — CHART NOTE - NSCHARTNOTEFT_GEN_A_CORE
Assessment: Pt admitted for severe sepsis without shock and acute hypoxemic respiratory failure requiring intubation and mechanical ventilation; suspect aspiration pneumonia vs UTI/cystitis. Pt s/p ICU stay, s/p extubation 4/20. Pt with dysphagia; per swallow evaluation on 4/26, SLP recommended Dysphagia 1 Pureed-Nectar Thick Liquids; pt tolerating/managing well. PMHx includes CAD s/p CABG, HTN, HLD and Parkinson's disease.    Factors impacting intake: [ ] none [ ] nausea  [ ] vomiting [ ] diarrhea [ ] constipation  [x]chewing problems [x] swallowing issues [x] other: some difficulty feeding self    Diet Prescription: Diet, Dysphagia 1 Pureed-Nectar Consistency Fluid (04-26-21 @ 11:56)    Intake: Po intake 75%    Current Weight: 66 kg (4/27), 66.7 kg (4/20)  % Weight Change: 1% wt loss x 1 week  Fluid accumulation: 1+ edema L arm & R arm (4/27); 1+ & 2+ edema on 4/20    Physical appearance: Pt with mild muscle wasting of orbital and triceps regions & mild fat depletion of clavicle region    Pertinent Medications: MEDICATIONS  (STANDING):  aspirin  chewable 81 milliGRAM(s) Oral daily  atorvastatin 20 milliGRAM(s) Oral at bedtime  chlorhexidine 2% Cloths 1 Application(s) Topical <User Schedule>  furosemide    Tablet 20 milliGRAM(s) Oral daily  heparin   Injectable 5000 Unit(s) SubCutaneous every 12 hours  metoprolol tartrate 12.5 milliGRAM(s) Enteral Tube two times a day  potassium chloride    Tablet ER 10 milliEquivalent(s) Oral once    MEDICATIONS  (PRN):    Pertinent Labs: 04-27 Na144 mmol/L Glu 258 mg/dL<H> K+ 3.4 mmol/L<L> Cr  1.51 mg/dL<H> BUN 36 mg/dL<H> 04-22 Phos 3.2 mg/dL 04-21 Alb 2.2 g/dL<L>    CAPILLARY BLOOD GLUCOSE    Skin: WDL    Estimated Needs:   [x] no change since previous assessment on 4/20  [ ] recalculated:     Previous Nutrition Diagnosis:   Nutrition Diagnostic Terminology #1 Inadequate Oral Intake.     Etiology altered swallow, s/p choking, respiratory distress.     Signs/Symptoms NPO x 1.     Goal/Expected Outcome pt to meet >50-75% protein-energy needs via tolerated route; goal met    Nutrition Diagnosis is [ ] ongoing  [x] resolved [ ] not applicable     New Nutrition Diagnosis: [x] not applicable      Interventions:   Recommend  [x] Change Diet To: Dysphagia 1 Pureed-Nectar Thick, Low Sodium diet  [x] Nutrition Supplement: Add Health Shake 1x daily (520 kcal & 22 gm protein) to current diet rx  [ ] Nutrition Support  [ ] Other:     Monitoring and Evaluation:   [ x ] PO intake [ x ] Tolerance to diet prescription [ x ] weights [ x ] labs (glucose, A1c) [ x ] follow up per protocol  [ ] other:

## 2021-04-27 NOTE — PROGRESS NOTE ADULT - SUBJECTIVE AND OBJECTIVE BOX
INTERVAL HPI/OVERNIGHT EVENTS: none    Code Status: full  Allergies    sulfa drugs (Unknown)    Intolerances    MEDICATIONS  (STANDING):  aspirin  chewable 81 milliGRAM(s) Oral daily  atorvastatin 20 milliGRAM(s) Oral at bedtime  chlorhexidine 2% Cloths 1 Application(s) Topical <User Schedule>  dextrose 5%. 1000 milliLiter(s) (100 mL/Hr) IV Continuous <Continuous>  furosemide    Tablet 20 milliGRAM(s) Oral daily  heparin   Injectable 5000 Unit(s) SubCutaneous every 12 hours  metoprolol tartrate 12.5 milliGRAM(s) Enteral Tube two times a day    MEDICATIONS  (PRN):      PRESENT SYMPTOMS: [ ]Unable to obtain due to poor mentation   Source if other than patient:  [ ]Family   [ ]Team     Pain (Impact on QOL):  denies pain  Location:  Severity:  Minimal acceptable level (0-10 scale):       Quality:       Onset:  Duration:  Aggravating factors:  Relieving Factors  Radiation:    Dyspnea:  Yes [ ] No x[ ] - [ ]Mild [ ]Moderate [ ]Severe  Anxiety:    Yes [ ] No [ x] - [ ]Mild [ ]Moderate [ ]Severe  Fatigue:    Yes [ ] No [ x] - [ ]Mild [ ]Moderate [ ]Severe  Nausea:    Yes [ ] No [x ] - [ ]Mild [ ]Moderate [ ]Severe                         Loss of appetite: Yes [ ] No [ x] - [ ]Mild [ ]Moderate [ ]Severe             Constipation:  Yes [ ] No [x ] - [ ]Mild [ ]Moderate [ ]Severe  Grief: Yes [ ] No [ x]     PAIN AD Score:	  [email or web address].[email or web address]/[email or web address] (Ctrl + left click to view)    Other Symptoms:  [ x]All other review of systems negative     Karnofsky Performance Score/Palliative Performance Status Version 2:     30    %    http://palliative.info/resource_material/[email or web address].pdf    PHYSICAL EXAM:  Vital Signs Last 24 Hrs  T(C): 36.4 (27 Apr 2021 12:22), Max: 36.7 (26 Apr 2021 16:47)  T(F): 97.6 (27 Apr 2021 12:22), Max: 98.1 (26 Apr 2021 16:47)  HR: 60 (27 Apr 2021 12:22) (59 - 64)  BP: 155/78 (27 Apr 2021 12:22) (145/72 - 170/84)  BP(mean): --  RR: 18 (27 Apr 2021 12:22) (17 - 18)  SpO2: 99% (27 Apr 2021 12:22) (95% - 99%) I&O's Summary       GENERAL:  [x ]Alert  [x ]Oriented x  2-3 [ ]Lethargic  [ ]Cachexia  [ ]Unarousable  [x ]Verbal  [ ]Non-Verbal  Behavioral:   [ ] Anxiety  [ ] Delirium [ ] Agitation [ ] Other  HEENT:  [ ]Normal   [ ]Dry mouth   [ ]ET Tube/Trach  [ ]Oral lesions  PULMONARY:   [ x]Clear [ x]Tachypnea  [ ]Audible excessive secretions   [ ]Rhonchi        [ ]Right [ ]Left [ ]Bilateral  [ ]Crackles        [ ]Right [ ]Left [ ]Bilateral  [ ]Wheezing     [ ]Right [ ]Left [ ]Bilateral  CARDIOVASCULAR:    [x ]Regular [ ]Irregular [ ]Tachy  [ ][place] [ ]Murmur [ ]Other  GASTROINTESTINAL:  [x ]Soft  [ ]Distended   [x ]+BS  [ x]Non tender [ ]Tender  [ ]PEG [ ]OGT/ NGT   Last BM: 4/26     GENITOURINARY:  [ ]Normal [x ] Incontinent   [ ]Oliguria/Anuria   [ ][place]  MUSCULOSKELETAL:   [ ]Normal   [x ]Weakness  [ ]Bed/Wheelchair bound [ ]Edema  NEUROLOGIC:   [ ]No focal deficits  [x ] Cognitive impairment  [ x] Dysphagia [ ]Dysarthria [ ] Paresis [ ]Other   SKIN:   [ x]Normal   [ ]Pressure ulcer(s)  [ ]Rash    CRITICAL CARE:  [ ] Shock Present  [ ]Septic [ ]Cardiogenic [ ]Neurologic [ ]Hypovolemic  [ ]  Vasopressors [ ]  Inotropes   [ ] Respiratory failure present  [ ] Acute  [ ] Chronic [ ] Hypoxic  [ ] Hypercarbic [ ] Other  [ ] Other organ failure     LABS:                        10.4   8.26  )-----------( 217      ( 26 Apr 2021 06:44 )             33.6   04-27    144  |  110<H>  |  36<H>  ----------------------------<  258<H>  3.4<L>   |  27  |  1.51<H>    Ca    8.3<L>      27 Apr 2021 12:41          RADIOLOGY & ADDITIONAL STUDIES:    Protein Calorie Malnutrition Present: [ ] yes [ ] no  [ ] PPSV2 < or = 30%  [ ] significant weight loss [ ] poor nutritional intake [ ] anasarca [ ] catabolic state Albumin, Serum: 2.2 g/dL (04-21-21 @ 04:53)      REFERRALS:   [ ]Chaplaincy  [ ] Hospice  [ ]Child Life  [ ]Social Work  [ ]Case management [ ]Holistic Therapy   Goals of Care Document:

## 2021-04-27 NOTE — PROGRESS NOTE ADULT - PROBLEM SELECTOR PLAN 4
remains on Zosyn.   no 100% way to prevent aspiration = explained this to daughter as did ICU physician previously  daughter remains quite optimistic

## 2021-04-27 NOTE — PROGRESS NOTE ADULT - PROBLEM SELECTOR PLAN 8
spoke yesterday with patient's daughter/HCP Megan Lyons , she maintains full code for her mother and is relieved she did not have to decide about feeding for now. She wants to take the issues as they come and remains hopeful her mother can regain her ambulatory ability at rehab which is the plan from here.  GOC are established, will no longer actively follow please recall should need arise.

## 2021-04-27 NOTE — PROGRESS NOTE ADULT - PROBLEM SELECTOR PLAN 1
pt denies any dyspnea but is visibly breathing with some effort -is tachypneic   has nasal oxygen on with good O2 sat.

## 2021-04-27 NOTE — PROGRESS NOTE ADULT - PROBLEM SELECTOR PLAN 7
previously was followed by a neurologist who initially thought pt had Parkinson's but pt did not tolerate the medications and physician felt they were not beneficial and she was moreso experiencing essential tremors.
previously was followed by a neurologist who initially thought pt had Parkinson's but pt did not tolerate the medications and physician felt they were not beneficial and she was moreso experiencing essential tremors. If pt does in fact have some Parkinson's or variant she could potentially benefit from treatment if able to tolerate meds as it may help her overall function including swallowing
previously was followed by a neurologist who initially thought pt had Parkinson's but pt did not tolerate the medications and physician felt they were not beneficial and she was moreso experiencing essential tremors. If pt does in fact have some Parkinson's or variant she could potentially benefit from treatment if able to tolerate meds as it may help her overall function including swallowing

## 2021-04-27 NOTE — PROGRESS NOTE ADULT - PROBLEM SELECTOR PLAN 3
progressively more dependent for care  has lived at [facility] for 2 years now, pt's daughter interested in patient going to rehab to regain ambulation prior to return to [facility]

## 2021-04-27 NOTE — PROGRESS NOTE ADULT - PROBLEM SELECTOR PLAN 6
mental status steadily improving close to baseline

## 2021-04-27 NOTE — PROGRESS NOTE ADULT - SUBJECTIVE AND OBJECTIVE BOX
Patient is a 89y old  Female who presents with a chief complaint of Aspiration PNA, Respiratory Failure (26 Apr 2021 14:50)      OVERNIGHT EVENTS:  none     REVIEW OF SYSTEMS: poor historian     MEDICATIONS  (STANDING):  aspirin  chewable 81 milliGRAM(s) Oral daily  atorvastatin 20 milliGRAM(s) Oral at bedtime  chlorhexidine 2% Cloths 1 Application(s) Topical <User Schedule>  dextrose 5%. 1000 milliLiter(s) (100 mL/Hr) IV Continuous <Continuous>  furosemide    Tablet 20 milliGRAM(s) Oral daily  heparin   Injectable 5000 Unit(s) SubCutaneous every 12 hours  metoprolol tartrate 12.5 milliGRAM(s) Enteral Tube two times a day    MEDICATIONS  (PRN):      Allergies    sulfa drugs (Unknown)    Intolerances        T(F): 97.4 (04-27-21 @ 04:53), Max: 98.1 (04-26-21 @ 16:47)  HR: 60 (04-27-21 @ 04:53) (59 - 64)  BP: 170/84 (04-27-21 @ 04:53) (148/83 - 170/84)  RR: 18 (04-27-21 @ 04:53) (18 - 18)  SpO2: 98% (04-27-21 @ 04:53) (96% - 98%)  Wt(kg): --    PHYSICAL EXAM:  GENERAL: NAD  HEAD:  Atraumatic, Normocephalic  EYES: PERRLA, conjunctiva and sclera clear  ENMT: Moist mucous membranes  NECK: Supple, No JVD, Normal thyroid  NERVOUS SYSTEM:  Alert & Awake  CHEST/LUNG: Clear to percussion bilaterally;   HEART: Regular rate and rhythm;   ABDOMEN: Soft, Nontender, Nondistended; Bowel sounds present  EXTREMITIES:  no edema BL LE  SKIN: moist    LABS:                        10.4   8.26  )-----------( 217      ( 26 Apr 2021 06:44 )             33.6     04-27    150<H>  |  113<H>  |  38<H>  ----------------------------<  95  3.5   |  31  |  1.32<H>    Ca    8.8      27 Apr 2021 08:15          Cultures;   CAPILLARY BLOOD GLUCOSE        Lipid panel:           RADIOLOGY & ADDITIONAL TESTS:    Imaging Personally Reviewed:  [x ] YES      Consultant(s) Notes Reviewed:  [x ] YES     Care Discussed with [x ] Consultants [X ] Patient [ ] Family  [x ]    [x ]  Other; RN

## 2021-04-27 NOTE — PROGRESS NOTE ADULT - SUBJECTIVE AND OBJECTIVE BOX
INTERVAL HISTORY:    alert  comfortable  remains on O2  recognizes me, told daughter correctly that I was there earlier  pain free    PAST MEDICAL & SURGICAL HISTORY:  CAD (coronary artery disease)  s/p CABG, PPM    HTN (hypertension)    HLD (hyperlipidemia)    Artificial pacemaker          MEDICATIONS:  MEDICATIONS  (STANDING):  aspirin  chewable 81 milliGRAM(s) Oral daily  atorvastatin 20 milliGRAM(s) Oral at bedtime  chlorhexidine 2% Cloths 1 Application(s) Topical <User Schedule>  furosemide    Tablet 20 milliGRAM(s) Oral daily  heparin   Injectable 5000 Unit(s) SubCutaneous every 12 hours  metoprolol tartrate 12.5 milliGRAM(s) Enteral Tube two times a day    MEDICATIONS  (PRN):      PHYSICAL EXAM:  T(F): 97.6 (04-27-21 @ 12:22), Max: 98.1 (04-26-21 @ 16:47)  HR: 60 (04-27-21 @ 12:22) (59 - 64)  BP: 155/78 (04-27-21 @ 12:22) (145/72 - 170/84)  RR: 18 (04-27-21 @ 12:22) (17 - 18)  SpO2: 99% (04-27-21 @ 12:22) (95% - 99%)  Wt(kg): --  I&O's Summary        PHYSICAL EXAM:    HEENT: sclera anicteric, conjunctiva normal  JVP normal  Carotids: normal upstroke  Lungs:  clear  Cor: normal S1S2, no S3, rub  Abdomen:  normal bs, nontender, nondistended, no organomegaly  Ext:  no edema  Neuro:  no focality       OTHER: 	    LABS:	     CARDIAC MARKERS:                                  10.4   8.26  )-----------( 217      ( 26 Apr 2021 06:44 )             33.6     04-27    144  |  110<H>  |  36<H>  ----------------------------<  258<H>  3.4<L>   |  27  |  1.51<H>    Ca    8.3<L>      27 Apr 2021 12:41        proBNP:   Lipid Profile:   HgA1c:   TSH:   Test                            Date  WBC         --                  04-27 @ 12:41  RBC         --                  04-27 @ 12:41  Hemoglobin  --                  04-27 @ 12:41  Hematocrit  --                  04-27 @ 12:41  Platelets   --                  04-27 @ 12:41  Creatinine  1.51                04-27 @ 12:41  Test                            Date  WBC         --                  04-27 @ 08:15  RBC         --                  04-27 @ 08:15  Hemoglobin  --                  04-27 @ 08:15  Hematocrit  --                  04-27 @ 08:15  Platelets   --                  04-27 @ 08:15  Creatinine  1.32                04-27 @ 08:15  Test                            Date  WBC         8.26                04-26 @ 06:44  RBC         3.51                04-26 @ 06:44  Hemoglobin  10.4                04-26 @ 06:44  Hematocrit  33.6                04-26 @ 06:44  Platelets   217                 04-26 @ 06:44  Creatinine  1.50                04-26 @ 06:44

## 2021-04-28 NOTE — PROGRESS NOTE ADULT - REASON FOR ADMISSION
Aspiration PNA, Respiratory Failure

## 2021-04-28 NOTE — PROGRESS NOTE ADULT - NSICDXPILOT_GEN_ALL_CORE
[place]

## 2021-04-28 NOTE — PROGRESS NOTE ADULT - PROVIDER SPECIALTY LIST ADULT
Cardiology
Critical Care
Gastroenterology
Hospitalist
Palliative Care
Cardiology
Hospitalist
Critical Care
Cardiology
Gastroenterology
Hospitalist
Palliative Care
Palliative Care

## 2021-04-28 NOTE — DISCHARGE NOTE PROVIDER - NSDCMRMEDTOKEN_GEN_ALL_CORE_FT
aspirin 81 mg oral tablet: 1 tab(s) orally once a day  Calcium 600+D oral tablet: 1 tab(s) orally 2 times a day  Centrum oral tablet: 1 tab(s) orally once a day  Colace 100 mg oral capsule: 2 cap(s) orally once a day  furosemide 20 mg oral tablet: 2 tab(s) orally once a day  Metoprolol Succinate ER 25 mg oral tablet, extended release: 1 tab(s) orally once a day  pravastatin 80 mg oral tablet: 1 tab(s) orally once a day   aspirin 81 mg oral tablet: 1 tab(s) orally once a day  atorvastatin 20 mg oral tablet: 1 tab(s) orally once a day (at bedtime)  Calcium 600+D oral tablet: 1 tab(s) orally 2 times a day  Centrum oral tablet: 1 tab(s) orally once a day  Colace 100 mg oral capsule: 2 cap(s) orally once a day  furosemide 20 mg oral tablet: 1 tab(s) orally once a day  K-Tab 8 mEq oral tablet, extended release: 1 tab(s) orally every 48 hours   Metoprolol Succinate ER 25 mg oral tablet, extended release: 1 tab(s) orally once a day

## 2021-04-28 NOTE — PROGRESS NOTE ADULT - TIME BILLING
clinical evaluation, chart review, updating patient/family on care of plan
GI
clinical evaluation, chart review, updating patient/family on care of plan
GI
clinical evaluation, chart review, updating patient/family on care of plan
evaluation of patient, collaboration with care teams and family
evlauation of patient, review of records, collaboration with care teams and family
evaluation of pt, review of records, collaboration with primary attending and consultants and discussion with daughter/HCP. 25 minutes of entire encounter spend face to face discussion with daughter regarding advance care planning.

## 2021-04-28 NOTE — DISCHARGE NOTE PROVIDER - HOSPITAL COURSE
89 F with CAD s/p CABG, HTN, HLD and Parkinson's disease presents with severe sepsis without shock and acute hypoxemic respiratory failure requiring intubation and mechanical ventilation. Suspect aspiration pneumonia vs UTI / cystitis. On empiric with Zosyn.    s/p severe sepsis without shock  s/p acute hypoxemic respiratory failure requiring intubation and mechanical ventilation.   Suspect aspiration pneumonia and UTI / cystitis.   Acute metabolic encephalopathy -Mental status continues to improve. Responds to simple yes/no questions.    Debility  Lacitic acidosis  -Completed Zosyn. ivf  - Doing well, mental status improving    Hypertension and mild edema.  CAD s/p CABG, HLD posble presumed diastolic chf -on echo completed during this admission was indeterminate   -cont on current meds reviewed cardiology note  .  Dysphagia  -was seen by speech pathology.- Dysphagia 1-puree with nectar thick liquids  -aspiration precaution maintained     DONITA improved  Hypokalemia- resolved      Parkinson's Disease  -supportive care, PT, Sinemet    PPX: HSQ  Full Code, Palliative care noted and she signed off  PT Eval- [facility], pending Auth and can d/c   daughter Megan Loyns [phone].   89 F with CAD s/p CABG, HTN, HLD and Parkinson's disease presents with severe sepsis without shock and acute hypoxemic respiratory failure requiring intubation and mechanical ventilation. Suspect aspiration pneumonia vs UTI / cystitis. On empiric with Zosyn.    s/p severe sepsis without shock  s/p acute hypoxemic respiratory failure requiring intubation and mechanical ventilation.   Suspect aspiration pneumonia and UTI / cystitis.   Acute metabolic encephalopathy -Mental status continues to improve. Responds to simple yes/no questions.    Debility  Lacitic acidosis  -Completed Zosyn. ivf  - Doing well, mental status improving    Hypertension and mild edema.  CAD s/p CABG, HLD posble presumed diastolic chf -on echo completed during this admission was indeterminate   -cont on current meds reviewed cardiology note  .  Dysphagia  -was seen by speech pathology.- Dysphagia 1-puree with nectar thick liquids  -aspiration precaution maintained     DONITA improved  Hypokalemia- resolved      Parkinson's Disease  -supportive care, PT, Sinemet    PPX: HSQ  Full Code, Palliative care noted and she signed off  PT Eval- [facility], pending Auth and can d/c   daughter Megan Lyons [phone].  TIME SPENT COMPLETING DISCHARGE AND COORDINATING CARE WITH NURSING,  AND CASE MANAGEMENT APPROX 40MINUTES

## 2021-04-28 NOTE — PROGRESS NOTE ADULT - SUBJECTIVE AND OBJECTIVE BOX
I am inheriting this patient on today 4/28/2021   Patient is a 89y old  Female who presents with a chief complaint of Aspiration PNA, Respiratory Failure (26 Apr 2021 14:50)      OVERNIGHT EVENTS:  none         MEDICATIONS  (STANDING):  aspirin  chewable 81 milliGRAM(s) Oral daily  atorvastatin 20 milliGRAM(s) Oral at bedtime  chlorhexidine 2% Cloths 1 Application(s) Topical <User Schedule>  furosemide    Tablet 20 milliGRAM(s) Oral daily  heparin   Injectable 5000 Unit(s) SubCutaneous every 12 hours  metoprolol tartrate 12.5 milliGRAM(s) Enteral Tube two times a day    MEDICATIONS  (PRN):      Allergies    sulfa drugs (Unknown)    Intolerances      Vital Signs Last 24 Hrs  T(C): 36.4 (28 Apr 2021 05:17), Max: 37.2 (27 Apr 2021 17:28)  T(F): 97.5 (28 Apr 2021 05:17), Max: 98.9 (27 Apr 2021 17:28)  HR: 60 (28 Apr 2021 05:17) (60 - 70)  BP: 148/82 (28 Apr 2021 05:17) (115/52 - 155/78)  BP(mean): --  RR: 18 (28 Apr 2021 05:17) (17 - 18)  SpO2: 98% (28 Apr 2021 05:17) (95% - 99%)      PHYSICAL EXAM:  GENERAL: NAD  HEAD:  Atraumatic, Normocephalic  EYES: PERRLA, conjunctiva and sclera clear  ENMT: Moist mucous membranes  NECK: Supple, No JVD, Normal thyroid  NERVOUS SYSTEM:  Alert & Awake  CHEST/LUNG: Clear to percussion bilaterally;   HEART: Regular rate and rhythm;   ABDOMEN: Soft, Nontender, Nondistended; Bowel sounds present  EXTREMITIES:  no edema BL LE  SKIN: moist    LABS:           04-28    144  |  112<H>  |  30<H>  ----------------------------<  85  3.7   |  29  |  1.15    Ca    8.2<L>      28 Apr 2021 08:21            Cultures;   CAPILLARY BLOOD GLUCOSE        Lipid panel:           RADIOLOGY & ADDITIONAL TESTS:    Imaging Personally Reviewed:  [x ] YES      Consultant(s) Notes Reviewed:  [x ] YES     Care Discussed with [x ] Consultants [X ] Patient [ ] Family  [x ]    [x ]  Other; RN

## 2021-04-28 NOTE — DISCHARGE NOTE NURSING/CASE MANAGEMENT/SOCIAL WORK - PATIENT PORTAL LINK FT
You can access the FollowMyHealth Patient Portal offered by [facility] by registering at the following website: [email or web address]. By joining [facility]’s FollowMyHealth portal, you will also be able to view your health information using other applications (apps) compatible with our system.

## 2021-04-28 NOTE — PROGRESS NOTE ADULT - ASSESSMENT
89 F with CAD s/p CABG, HTN, HLD and Parkinson's disease presents with severe sepsis without shock and acute hypoxemic respiratory failure requiring intubation and mechanical ventilation. Suspect aspiration pneumonia vs UTI / cystitis. On empiric with Zosyn.    s/p severe sepsis without shock  s/p acute hypoxemic respiratory failure requiring intubation and mechanical ventilation.   Suspect aspiration pneumonia and UTI / cystitis.   Acute metabolic encephalopathy -Mental status continues to improve. Responds to simple yes/no questions.    Debility  Lacitic acidosis  -Completed Zosyn. ivf      Hypertension and mild edema.  CAD s/p CABG, HLD posble presumed diastolic chf -on echo completed during this admission was indeterminate   -cont on current meds reviewed cardiology note  .    Dysphagia  -was seen by speech pathology.- Dysphagia 1-puree with nectar thick liquids  -aspiration precaution maintained     DONITA improved  Hypokalemia- resolved      Parkinson's Disease  -supportive care, PT, Sinemet    PPX: HSQ  Full Code, Palliative care noted and she signed off  PT Eval- [facility], pending Auth and can d/c   daughter Megan Lyons [phone].

## 2021-04-28 NOTE — DISCHARGE NOTE PROVIDER - NSDCCPCAREPLAN_GEN_ALL_CORE_FT
PRINCIPAL DISCHARGE DIAGNOSIS  Diagnosis: Acute respiratory failure with hypoxia  Assessment and Plan of Treatment: 89 F with CAD s/p CABG, HTN, HLD and Parkinson's disease presents with severe sepsis without shock and acute hypoxemic respiratory failure requiring intubation and mechanical ventilation. Suspect aspiration pneumonia vs UTI / cystitis. On empiric with Zosyn.        SECONDARY DISCHARGE DIAGNOSES  Diagnosis: Pneumonia  Assessment and Plan of Treatment:      PRINCIPAL DISCHARGE DIAGNOSIS  Diagnosis: Acute respiratory failure with hypoxia  Assessment and Plan of Treatment: 89 F with CAD s/p CABG, HTN, HLD and Parkinson's disease presents with severe Sepsis-        SECONDARY DISCHARGE DIAGNOSES  Diagnosis: Dysphagia  Assessment and Plan of Treatment: Dysphagia    Diagnosis: Aspiration pneumonia of right lung, unspecified aspiration pneumonia type, unspecified part of lung  Assessment and Plan of Treatment: Aspiration pneumonia of right lung, unspecified aspiration pneumonia type, unspecified part of lung    Diagnosis: Pneumonia  Assessment and Plan of Treatment:     Diagnosis: DONITA (acute kidney injury)  Assessment and Plan of Treatment: DONITA (acute kidney injury)    Diagnosis: Severe sepsis  Assessment and Plan of Treatment:     Diagnosis: Acute metabolic encephalopathy  Assessment and Plan of Treatment:

## 2021-06-24 NOTE — SWALLOW VFSS/MBS ASSESSMENT ADULT - PHARYNGEAL PHASE COMMENTS
Triggering of pharyngeal swallow was timely to mildly latent. Hyolaryngeal excursion during the swallow was mildly reduced but functional as well. Pharyngeal motility and cricopharyngeal sphincter opening were unremarkable/age acceptable. Triggering of pharyngeal swallow was timely to mildly latent. Hyolaryngeal excursion during the swallow was mildly reduced but functional as well. Pharyngeal motility and cricopharyngeal sphincter opening were unremarkable/age acceptable. POSTERIOR EPIGLOTTIC WALL COATING WAS DEMONSTRATED DURING THE SWALLOW WITH REPETITIVE SIPS OF THIN LIQUIDS BUT MATERIAL WAS RETRIEVED WITHOUT DISTAL LARYNGEAL PENETRATION UPON COMPLETION OF EPIGLOTTIC RETROFLEXION. NO POSTERIOR EPIGLOTTIC WALL COATING EVIDENT WITH THIN LIQUIDS IN SINGLE SIPS, NECTAR THICK LIQUIDS REGARDLESS OF VOLUME, PUREE FOODS AND SOLIDS. [place], NO ASPIRATION WAS VISUALIZED UNDER FLUOROSCOPIC CONTROL WITH THIN LIQUIDS, NECTAR THICK LIQUIDS, PUREE FOODS AND MECHANICAL SOFT SOLIDS. COARSE SOLIDS NOT OFFERED GIVEN THE SEVERITY OF PATIENT'S DYSPHAGIA. Triggering of pharyngeal swallow was timely to mildly latent. Hyolaryngeal excursion and epiglottic retroflexion during the swallow were mildly reduced but functional. Pharyngeal motility and cricopharyngeal sphincter opening were unremarkable/age acceptable. POSTERIOR EPIGLOTTIC WALL COATING WAS DEMONSTRATED DURING THE SWALLOW WITH REPETITIVE SIPS OF THIN LIQUIDS BUT MATERIAL WAS RETRIEVED WITHOUT DISTAL LARYNGEAL PENETRATION UPON COMPLETION OF EPIGLOTTIC RETROFLEXION. NO POSTERIOR EPIGLOTTIC WALL COATING EVIDENT WITH THIN LIQUIDS IN SINGLE SIPS, NECTAR THICK LIQUIDS REGARDLESS OF VOLUME, PUREE FOODS AND SOLIDS. [place], NO ASPIRATION WAS VISUALIZED UNDER FLUOROSCOPIC CONTROL WITH THIN LIQUIDS, NECTAR THICK LIQUIDS, PUREE FOODS AND MECHANICAL SOFT SOLIDS. COARSE SOLIDS NOT OFFERED GIVEN THE SEVERITY OF PATIENT'S DYSPHAGIA.

## 2021-06-24 NOTE — SWALLOW VFSS/MBS ASSESSMENT ADULT - ORAL PREP COMMENTS
The patient willingly accepted barium altered materials and demonstrated functional labial grading on utensils. No dribbling was noted. The patient willingly accepted barium altered materials. Oral aperture was reduced when accepting PO but she demonstrated functional labial grading on utensils. No dribbling was noted.

## 2021-06-24 NOTE — SWALLOW VFSS/MBS ASSESSMENT ADULT - ORAL PHASE COMMENTS
Bolus formation/transfer were achieved via mildly to moderately prolonged but functional lingual pumping actions and mildly to moderately prolonged/discontinuous munch rotary masticatory motions that were functional with mechanical soft foods. Piecemeal deglutition was evident. Mild scattered tongue debris noted with mechanical soft solids. Tongue base retraction was decreased. Premature loss of thin liquids in repetitive sips to the Valleculae was demonstrated prior to pharyngeal swallowing attempts.

## 2021-06-24 NOTE — SWALLOW VFSS/MBS ASSESSMENT ADULT - CONSISTENCIES ADMINISTERED
All PO was either coated or injected with barium for contrast  purposes./thin liquid/nectar thick/puree/mech soft

## 2021-06-24 NOTE — SWALLOW VFSS/MBS ASSESSMENT ADULT - DIAGNOSTIC IMPRESSIONS
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 ORAL PROCESSING WAS MILDLY TO MODERATELY PROLONGED BUT FELT TO BE FUNCTIONAL WITH FOOD CONSISTENCIES OFFERED DURING THE PROCEDURE. SWALLOW TRIGGER WAS TIMELY TO MILDLY LATENT.  POSTERIOR EPIGLOTTIC WALL COATING WAS DEMONSTRATED DURING THE SWALLOW WITH REPETITIVE SIPS OF THIN LIQUIDS BUT MATERIAL WAS COMPLETELY RETRIEVED WITHOUT DISTAL LARYNGEAL PENETRATION UPON COMPLETION OF EPIGLOTTIC RETROFLEXION. NO POSTERIOR EPIGLOTTIC WALL COATING EVIDENT WITH THIN LIQUIDS IN SINGLE SIPS, NECTAR THICK LIQUIDS REGARDLESS OF VOLUME, PUREE FOODS AND SOLIDS. MOREOVER, NO ASPIRATION WAS VISUALIZED UNDER FLUOROSCOPIC CONTROL WITH THIN LIQUIDS, NECTAR THICK LIQUIDS, PUREE FOODS AND MECHANICAL SOFT SOLIDS. COARSE SOLIDS NOT OFFERED GIVEN THE SEVERITY OF PATIENT'S DYSPHAGIA. PARKINSON'S IS LIKELY CONTRIBUTORY TO HER DYSPHAGIA.

## 2021-06-24 NOTE — SWALLOW VFSS/MBS ASSESSMENT ADULT - ADDITIONAL RECOMMENDATIONS
1) SUGGEST A MECHANICAL SOFT TEXTURE DIET WITH THIN LIQUID CONSISTENCIES AS TOLERATED. THIS IS THE LEAST RESTRICTIVE TOLERABLE DIET CONSISTENCIES FROM AN OROPHARYNGEAL SWALLOWING PERSPECTIVE ON EXAM.     2) PT SHOULD AT AS CLOSE TO A 90 DEGREE ANGLE AS POSSIBLE AND ACCEPT LIQUIDS IN SINGLE NON REPETITIVE SIPS.    3) SPEECH PATHOLOGY FOLLOW UP IS AT DISCRETION OF SLP AT [place].     4) RECONSULT DILMA AYERS MA, [facility]    [phone] 1) SUGGEST A MECHANICAL SOFT TEXTURE DIET WITH THIN LIQUID CONSISTENCIES AS TOLERATED. THIS IS THE LEAST RESTRICTIVE TOLERABLE DIET CONSISTENCIES FROM AN OROPHARYNGEAL SWALLOWING PERSPECTIVE ON EXAM.     2) PT SHOULD SIT  AT AS CLOSE TO A 90 DEGREE ANGLE AS POSSIBLE WHEN EATING AND ACCEPT LIQUIDS IN SINGLE NON REPETITIVE SIPS.    3) SPEECH PATHOLOGY FOLLOW UP IS AT DISCRETION OF SLP AT [place].     4) RECONSULT DILMA AYERS MA, [facility]    [phone]

## 2021-06-24 NOTE — SWALLOW VFSS/MBS ASSESSMENT ADULT - COMMENTS
The patient was referred for an outpatient Modified Barium Swallowing Study(MBS). She currently resides at [facility]. Selected medical history is remarkable for CAD, pacemaker placement, HTN, HLD, constipation and Dysphagia. The patient is on a puree texture diet with nectar thick liquids. MBS requested. The patient was referred for an outpatient Modified Barium Swallowing Study(MBS). She currently resides at [facility]. Selected medical history is remarkable for CAD s/p CABG, pacemaker placement, HTN, HLD, constipation and Dysphagia. The patient is on a puree texture diet with nectar thick liquids. MBS requested. The patient was referred for an outpatient Modified Barium Swallowing Study(MBS). She currently resides at [facility]. Selected medical history is remarkable for Parkinson's, CAD s/p CABG, pacemaker placement, HTN, HLD, constipation and Dysphagia. The patient is reportedly on a puree texture diet with nectar thick liquids at [place]. MBS requested.

## 2021-06-24 NOTE — SWALLOW VFSS/MBS ASSESSMENT ADULT - ADDITIONAL INFORMATION
The patient was postured upright in a VIOLET chair for testing and was studied in the lateral plane.

## 2021-08-07 NOTE — ED PROVIDER NOTE - CLINICAL SUMMARY MEDICAL DECISION MAKING FREE TEXT BOX
Likely sepsis. Plan for labs, urine and antibiotics. Likely sepsis. Plan for labs, urine and antibiotics.  ua suggests uti. previous urine culture sensitive to ceftriaxone

## 2021-08-07 NOTE — ED PROVIDER NOTE - OBJECTIVE STATEMENT
89 yo F w/PMH of CAD, HTN, and HLD presents to the ED from nursing home for altered mental status. Pt is limited by dementia. 89 yo F w/PMH of CAD, HTN, and HLD presents to the ED from [facility] for altered mental status. Pt is limited by dementia. 89 yo F w/PMH of CAD, HTN, and HLD presents to the ED from [facility] for altered mental status. Pt is limited by dementia. Spoke to Pt's daughter, daughter had no information. Spoke to [facility], had no response. 89 yo F w/PMH of CAD, HTN, and HLD presents to the ED from [facility] for altered mental status. Pt is limited by dementia. Spoke to Pt's daughter on the phone, daughter had no information. Spoke to [facility], had no response.

## 2021-08-07 NOTE — ED ADULT NURSE NOTE - NSIMPLEMENTINTERV_GEN_ALL_ED
Implemented All Fall with Harm Risk Interventions:  [place] to call system. Call bell, personal items and telephone within reach. Instruct patient to call for assistance. Room bathroom lighting operational. Non-slip footwear when patient is off stretcher. Physically safe environment: no spills, clutter or unnecessary equipment. Stretcher in lowest position, wheels locked, appropriate side rails in place. Provide visual cue, wrist band, yellow gown, etc. Monitor gait and stability. Monitor for mental status changes and reorient to person, place, and time. Review medications for side effects contributing to fall risk. Reinforce activity limits and safety measures with patient and family. Provide visual clues: red socks.

## 2021-08-07 NOTE — H&P ADULT - HISTORY OF PRESENT ILLNESS
89F with PMHx of CAD (post-CABG), HTN, and Parkinson's Disease sent in from [facility] assisted living for increasing confusion. History is limited as patient has baseline dementia and may be acutely altered. Unable to reach [facility] and daughter not able to offer further information. It seems patient recently here in April 2021 for acute hypoxic respiratory failure secondary to aspiration. She required intubation and then discharged. Today patient is here with fever, but otherwise normal vitals. Patient possibly with UTI. She was incidentally found to be COVID-19 positive, but was fully vaccinated in February 2021 with Pfizer. Patient given 2.3 L LR bolus and Ceftriaxone.

## 2021-08-07 NOTE — ED ADULT NURSE NOTE - NSFALLRSKHRMRISKTYPE_ED_ALL_ED
MOC concerned re: previous dental work contributing to fever. Requesting dental consult.
age(85 years old or older)

## 2021-08-07 NOTE — H&P ADULT - NSHPPHYSICALEXAM_GEN_ALL_CORE
T(C): 38.3 (08-07-21 @ 16:23), Max: 38.3 (08-07-21 @ 16:23)  HR: 70 (08-07-21 @ 16:23) (70 - 70)  BP: 163/70 (08-07-21 @ 16:23) (163/70 - 163/70)  RR: 18 (08-07-21 @ 16:23) (18 - 18)  SpO2: 97% (08-07-21 @ 16:23) (97% - 97%)    GEN: (fe)male in NAD, appears comfortable, no diaphoresis  EYES: No scleral injection, PERRL, EOMI  ENTM: neck supple & symmetric without tracheal deviation, moist membranes, no gross hearing impairment, thyroid gland not enlarged  CV: +S1/S2, no m/r/g, no abdominal bruit, no LE edema  RESP: breathing comfortably, no respiratory accessory muscle use, CTAB, no w/r/r  GI: normoactive BS, soft, NTND, no rebounding/guarding, no palpable masses  LYMPHATICS: no LAD or tenderness to palpation  NEURO: AOx3, no focal deficits, CNII-XII grossly intact  PSYCH: No SI/HI/AVH, appropriate affect, appropriate insight/judgment   SKIN: no petechiae, ecchymosis or maculopapular rash noted T(C): 38.3 (08-07-21 @ 16:23), Max: 38.3 (08-07-21 @ 16:23)  HR: 70 (08-07-21 @ 16:23) (70 - 70)  BP: 163/70 (08-07-21 @ 16:23) (163/70 - 163/70)  RR: 18 (08-07-21 @ 16:23) (18 - 18)  SpO2: 97% (08-07-21 @ 16:23) (97% - 97%)    GEN: female in NAD, appears comfortable, no diaphoresis  EYES: No scleral injection, PERRL, EOMI  ENTM: neck supple & symmetric without tracheal deviation, moist membranes, no gross hearing impairment, thyroid gland not enlarged  CV: +S1/S2, no m/r/g, no abdominal bruit, no LE edema  RESP: breathing comfortably, no respiratory accessory muscle use, CTAB, no w/r/r  GI: normoactive BS, soft, NTND, no rebounding/guarding, no palpable masses  LYMPHATICS: no LAD or tenderness to palpation  NEURO: AOx1, no focal deficits, CNII-XII grossly intact  PSYCH: flat affect, +confused  SKIN: no petechiae, ecchymosis or maculopapular rash noted

## 2021-08-07 NOTE — H&P ADULT - ASSESSMENT
89F with PMHx of CAD, HTN, and Parkinson's Disease presenting from [facility] for increasing confusion. There is limited collateral from daughter and [facility] unable to be reached overnight. On labs patient with pyuria and DONITA and found to be incidentally COVID-19 positive. Patient being admitted for likely infectious encephalopathy.    #Infectious Encephalopathy  -Will cover for possible UTI and aspiration with Zosyn  -Follow up blood and urine cultures  -NPO for now, reassess mentation clinically  -Possibly COVID-19 is contributing to presentation, plan below    #COVID-19 - incidentally positive. Patient with weak antibodies in April 2021  -Currently not hypoxic, so will defer Remdesivir & steroids  -Monitor O2 saturation  -Send inflammatory markers: ferritin, D-Dimer, CRP  -DVT PPx with unfractionated heparin    #DONITA  -Possibly pre-renal in etiology, follow up Cr in AM after 30 cc/kg bolus given by ED  -Renal US ordered by ED    #CAD  -Continue with baby aspirin and Lipitor as tolerated  -Continue with beta blocker as tolerated (on ToprolXL 25 mg daily)    #Parkinson's Disease   -Per prior notes patient is not on any medications due to poor response    NOTE IS NOT COMPLETE YET   89F with PMHx of CAD, HTN, and Parkinson's Disease presenting from [facility] for increasing confusion. There is limited collateral from daughter and [facility] unable to be reached overnight. On labs patient with pyuria and DONITA and found to be incidentally COVID-19 positive. Patient being admitted for likely infectious encephalopathy.    #Infectious Encephalopathy  -Will cover for possible UTI and aspiration with Zosyn  -Follow up blood and urine cultures  -NPO for now, reassess mentation clinically  -Possibly COVID-19 is contributing to presentation, plan below    #COVID-19 - incidentally positive. Patient with weak antibodies in April 2021. She was fully vaccinated on February 2021 with Pfizer. Wonder if this could be breakthrough infection?  -Currently not hypoxic, so will defer Remdesivir & steroids  -Monitor O2 saturation  -Send inflammatory markers: ferritin, D-Dimer, CRP  -DVT PPx with unfractionated heparin    #DONITA  -Possibly pre-renal in etiology, follow up Cr in AM after 30 cc/kg bolus given by ED  -Renal US ordered by ED    #CAD  -Continue with baby aspirin and Lipitor as tolerated  -Continue with beta blocker as tolerated (on ToprolXL 25 mg daily)    #Parkinson's Disease   -Per prior notes patient is not on any medications due to poor response    PT consult when improving mentally  Restart diet when improving mentally 89F with PMHx of CAD, HTN, and Parkinson's Disease presenting from [facility] for increasing confusion. There is limited collateral from daughter and [facility] unable to be reached overnight. On labs patient with pyuria and DONITA and found to be incidentally COVID-19 positive. Patient being admitted for likely infectious encephalopathy.    #Infectious Encephalopathy  -Will cover for possible UTI and aspiration with Zosyn  -Follow up blood and urine cultures  -NPO for now, reassess mentation clinically  -Possibly COVID-19 is contributing to presentation, plan below    #COVID-19 - incidentally positive. Patient with weak antibodies in April 2021. She was fully vaccinated on February 2021 with Pfizer. Wonder if this could be breakthrough infection?  -Currently not hypoxic, so will defer Remdesivir & steroids  -Monitor O2 saturation  -Send inflammatory markers: ferritin, D-Dimer, CRP  -DVT PPx with unfractionated heparin    #DONITA  -Possibly pre-renal in etiology, follow up Cr in AM after 30 cc/kg bolus given by ED  -Renal US ordered by ED    #R/O C. Diff  -Per [facility] medication list patient is on Vancomycin 125 mg daily M/W/F until 8/20 which does not make too much sense, need further collateral regarding reasoning/logic  -Can continue dosing for now until collateral obtained  -If patient has diarrhea then send C. Diff and GI PCR    #CAD  -Continue with baby aspirin and Lipitor as tolerated  -Continue with beta blocker as tolerated (on ToprolXL 25 mg daily)    #Parkinson's Disease   -Per prior notes patient is not on any medications due to poor response    PT consult when improving mentally  Restart diet when improving mentally 89F with PMHx of CAD, HTN, and Parkinson's Disease presenting from [facility] for increasing confusion. There is limited collateral from daughter and [facility] unable to be reached overnight. On labs patient with pyuria and DONITA and found to be incidentally COVID-19 positive. Patient being admitted for likely infectious encephalopathy.    #Infectious Encephalopathy  -Will cover for possible UTI and aspiration with Zosyn  -Follow up blood and urine cultures  -NPO for now, reassess mentation clinically  -Possibly COVID-19 is contributing to presentation, plan below    #COVID-19 - incidentally positive. Patient with weak antibodies in April 2021. She was fully vaccinated on February 2021 with Pfizer. Wonder if this could be breakthrough infection?  -Currently not hypoxic, so will defer Remdesivir & steroids  -Monitor O2 saturation  -Send inflammatory markers: ferritin, D-Dimer, CRP  -DVT PPx with unfractionated heparin    #DONITA  -Possibly pre-renal in etiology, follow up Cr in AM after 30 cc/kg bolus given by ED  -Renal US ordered by ED    #R/O C. Diff  -Per [facility] medication list patient is on Vancomycin 125 mg daily M/W/F until 8/20 which probably means they are pulsing her --> [facility] Pharmacist to follow up  -Can continue dosing for now until collateral obtained  -If patient has diarrhea then send C. Diff and GI PCR    #CAD  -Continue with baby aspirin and Lipitor as tolerated  -Continue with beta blocker as tolerated (on ToprolXL 25 mg daily)    #Parkinson's Disease   -Per prior notes patient is not on any medications due to poor response    PT consult when improving mentally  Restart diet when improving mentally

## 2021-08-07 NOTE — ED PROVIDER NOTE - PHYSICAL EXAMINATION
Gen: Alert, NAD. +pt is a weak, elderly female. Heart regular rhythm, lungs diminished breath sounds b/l.  Head: NC, AT   Eyes: PERRL, EOMI, normal lids/conjunctiva  ENT: normal hearing, patent oropharynx without erythema/exudate, uvula midline  Neck: supple, no tenderness, Trachea midline  Pulm: Bilateral BS, normal resp effort, no wheeze/stridor/retractions  CV: RRR, no M/R/G, 2+ radial and dp pulses bl, no edema  Abd: soft, NT/ND, +BS, no hepatosplenomegaly  Mskel: extremities x4 with normal ROM and no joint effusions. no ctl spine ttp.   Skin: no rash, no bruising   Neuro: AAOx3, no sensory/motor deficits, CN 2-12 intact. +alert and oriented to self only.

## 2021-08-07 NOTE — ED ADULT NURSE NOTE - OBJECTIVE STATEMENT
Pt sent from NH for lethargy, nonverbal and shaking. Hx of dementia baseline some words, alert and responsive. Pt febrile, shaking, lethargic on arrival. Sepsis protocol in place. Urine sample obtained via straight catheterization.

## 2021-08-07 NOTE — ED ADULT NURSE NOTE - PMH
Alzheimer's dementia with behavioral disturbance, unspecified timing of dementia onset    CAD (coronary artery disease)  s/p CABG, PPM  HLD (hyperlipidemia)    HTN (hypertension)

## 2021-08-07 NOTE — H&P ADULT - NSICDXPASTMEDICALHX_GEN_ALL_CORE_FT
PAST MEDICAL HISTORY:  Alzheimer's dementia with behavioral disturbance, unspecified timing of dementia onset     CAD (coronary artery disease) s/p CABG, PPM    HLD (hyperlipidemia)     HTN (hypertension)

## 2021-08-07 NOTE — ED PROVIDER NOTE - CARE PLAN
Principal Discharge DX:	Acute pyelonephritis  Secondary Diagnosis:	Delirium   Principal Discharge DX:	Acute pyelonephritis  Secondary Diagnosis:	Delirium  Secondary Diagnosis:	COVID-19

## 2021-08-08 NOTE — PATIENT PROFILE ADULT - FUNCTIONAL SCREEN CURRENT LEVEL: SWALLOWING (IF SCORE 2 OR MORE FOR ANY ITEM, CONSULT REHAB SERVICES), MLM)
2 = difficulty swallowing liquids/foods I will SWITCH the dose or number of times a day I take the medications listed below when I get home from the hospital:    amitriptyline 10 mg oral tablet  -- 1 tab(s) by mouth once a day (at bedtime)

## 2021-08-08 NOTE — PROGRESS NOTE ADULT - ASSESSMENT
89F with PMHx of CAD, HTN, and Parkinson's Disease presenting from [facility] for increasing confusion. There is limited collateral from daughter and [facility] unable to be reached overnight. On labs patient with pyuria and DONITA and found to be incidentally COVID-19 positive. Patient being admitted for likely infectious encephalopathy.    #Infectious Encephalopathy  -Will cover for possible UTI and aspiration with Zosyn  -Follow up blood and urine cultures  -NPO for now, reassess mentation clinically  -Possibly COVID-19 is contributing to presentation, plan below    #COVID-19 - incidentally positive. Patient with weak antibodies in April 2021. She was fully vaccinated on February 2021 with Pfizer. Wonder if this could be breakthrough infection?  -Currently not hypoxic, so will defer Remdesivir & steroids  -Monitor O2 saturation  -Send inflammatory markers: ferritin, D-Dimer, CRP  -DVT PPx with unfractionated heparin    #CKD stage 3 and DONITA  -Possibly pre-renal in etiology, follow up Cr in AM after 30 cc/kg bolus given by ED  -f/u Renal US ordered by ED  gentle  ivf     #R/O C. Diff  - per my colleague's note he reviewed  [facility] medication list patient is on Vancomycin 125 mg daily M/W/F until 8/20 which probably means they are pulsing her --> [facility] Pharmacist to follow up    no diarrhea reported by nursing staff      #CAD  -Continue with baby aspirin and Lipitor as tolerated  -Continue with beta blocker as tolerated (on Toprol XL 25 mg daily)    #Parkinson's Disease   -Per prior notes patient is not on any medications due to poor response    PT consult  and swallow eval  89F with PMHx of CAD, HTN, and Parkinson's Disease presenting from [facility] for increasing confusion. There is limited collateral from daughter and [facility] unable to be reached overnight. On labs patient with pyuria and DONITA and found to be incidentally COVID-19 positive. Patient being admitted for likely infectious encephalopathy.    #Infectious Encephalopathy  -Will cover for possible UTI and aspiration with Zosyn  -Follow up blood and urine cultures  -Possibly COVID-19 is contributing to presentation, plan below   improving mentation. need to confirm baseline    #COVID-19 - incidentally positive. Patient with weak antibodies in April 2021. She was fully vaccinated on February 2021 with Pfizer. Wonder if this could be breakthrough infection?  -Currently not hypoxic, so will defer Remdesivir & steroids  -Monitor O2 saturation  -Send inflammatory markers: ferritin, D-Dimer, CRP  -DVT PPx with unfractionated heparin    #CKD stage 3 and DONITA  -Possibly pre-renal in etiology, follow up Cr in AM after 30 cc/kg bolus given by ED  -f/u Renal US ordered by ED  gentle  ivf     #R/O C. Diff  - per my colleague's note he reviewed  [facility] medication list patient is on Vancomycin 125 mg daily M/W/F until 8/20 which probably means they are pulsing her --> [facility] Pharmacist to follow up    no diarrhea reported by nursing staff      #CAD  -Continue with baby aspirin and Lipitor as tolerated  -Continue with beta blocker as tolerated (on Toprol XL 25 mg daily)    #Parkinson's Disease   -Per prior notes patient is not on any medications due to poor response    PT consult  and swallow eval

## 2021-08-08 NOTE — PROGRESS NOTE ADULT - SUBJECTIVE AND OBJECTIVE BOX
Patient is a 89y old  Female who presents with a chief complaint of Confusion (07 Aug 2021 20:20)      OVERNIGHT EVENTS:      REVIEW OF SYSTEMS: denies chest pain/SOB, diaphoresis, no F/C, cough, dizziness, headache, blurry vision, nausea, vomiting, abdominal pain. Rest unremarkable     MEDICATIONS  (STANDING):  amLODIPine   Tablet 2.5 milliGRAM(s) Oral daily  aspirin  chewable 81 milliGRAM(s) Oral daily  atorvastatin 20 milliGRAM(s) Oral at bedtime  heparin   Injectable 5000 Unit(s) SubCutaneous every 8 hours  metoprolol succinate ER 25 milliGRAM(s) Oral daily  piperacillin/tazobactam IVPB.. 3.375 Gram(s) IV Intermittent every 8 hours  vancomycin    Solution 125 milliGRAM(s) Oral <User Schedule>    MEDICATIONS  (PRN):  acetaminophen   Tablet .. 650 milliGRAM(s) Oral every 6 hours PRN Temp greater or equal to 38C (100.4F), Mild Pain (1 - 3), Moderate Pain (4 - 6), Severe Pain (7 - 10)      Allergies    sulfa drugs (Unknown)    Intolerances        SUBJECTIVE: in bed in NAD, no acute events overnight     T(F): 99 (21 @ 12:47), Max: 101 (21 @ 16:23)  HR: 74 (21 @ 12:47) (68 - 75)  BP: 126/60 (21 @ 12:47) (126/60 - 163/70)  RR: 16 (21 @ 12:47) (16 - 20)  SpO2: 97% (21 @ 12:47) (93% - 97%)  Wt(kg): --    PHYSICAL EXAM:  GENERAL: NAD, thin and elderly   HEAD:  Atraumatic, Normocephalic  EYES: EOMI, PERRLA, conjunctiva and sclera clear  ENMT: No tonsillar erythema, exudates, or enlargement; Moist mucous membranes, Good dentition, No lesions  NECK: Supple, No JVD, Normal thyroid  CHEST/LUNG: Clear to  auscultation bilaterally; No rales, rhonchi, wheezing, or rubs  bilaterally  HEART: Regular rate and rhythm; No murmurs, rubs, or gallops  ABDOMEN: Soft, Nontender, Nondistended; Bowel sounds present  EXTREMITIES:  2+ Peripheral Pulses, No clubbing, cyanosis, or edema BL LE  SKIN: No rashes or lesions  NERVOUS SYSTEM:        LABS:                        8.7    5.45  )-----------( 172      ( 08 Aug 2021 07:11 )             27.1     08-08    142  |  110<H>  |  40<H>  ----------------------------<  81  4.9   |  23  |  1.55<H>    Ca    8.3<L>      08 Aug 2021 07:11  Phos  3.0     08-08  Mg     2.4     08-08    TPro  6.2  /  Alb  2.5<L>  /  TBili  0.4  /  DBili  x   /  AST  33  /  ALT  49  /  AlkPhos  115  08-08    PT/INR - ( 07 Aug 2021 17:38 )   PT: 12.6 sec;   INR: 1.09 ratio         PTT - ( 07 Aug 2021 17:38 )  PTT:28.4 sec  Urinalysis Basic - ( 07 Aug 2021 18:30 )    Color: Yellow / Appearance: Slightly Turbid / S.010 / pH: x  Gluc: x / Ketone: Negative  / Bili: Negative / Urobili: Negative mg/dL   Blood: x / Protein: 30 mg/dL / Nitrite: Negative   Leuk Esterase: Moderate / RBC: 0-2 /HPF / WBC 26-50   Sq Epi: x / Non Sq Epi: Occasional / Bacteria: Many      Cultures;   CAPILLARY BLOOD GLUCOSE        Lipid panel:           RADIOLOGY & ADDITIONAL TESTS:      Imaging Personally Reviewed:  [ x] YES      Consultant(s) Notes Reviewed:  [x ] YES     Care Discussed with [x ] Consultants [X ] Patient [x ] Family  [x ]    [x ]  Other; RN Patient is a 89y old  Female who presents with a chief complaint of Confusion (07 Aug 2021 20:20)      OVERNIGHT EVENTS:      REVIEW OF SYSTEMS: denies chest pain/SOB, diaphoresis, no F/C, cough, dizziness, headache, blurry vision, nausea, vomiting, abdominal pain. Rest unremarkable     MEDICATIONS  (STANDING):  amLODIPine   Tablet 2.5 milliGRAM(s) Oral daily  aspirin  chewable 81 milliGRAM(s) Oral daily  atorvastatin 20 milliGRAM(s) Oral at bedtime  heparin   Injectable 5000 Unit(s) SubCutaneous every 8 hours  metoprolol succinate ER 25 milliGRAM(s) Oral daily  piperacillin/tazobactam IVPB.. 3.375 Gram(s) IV Intermittent every 8 hours  vancomycin    Solution 125 milliGRAM(s) Oral <User Schedule>    MEDICATIONS  (PRN):  acetaminophen   Tablet .. 650 milliGRAM(s) Oral every 6 hours PRN Temp greater or equal to 38C (100.4F), Mild Pain (1 - 3), Moderate Pain (4 - 6), Severe Pain (7 - 10)      Allergies    sulfa drugs (Unknown)    Intolerances        SUBJECTIVE: in bed in NAD, no acute events overnight     T(F): 99 (21 @ 12:47), Max: 101 (21 @ 16:23)  HR: 74 (21 @ 12:47) (68 - 75)  BP: 126/60 (21 @ 12:47) (126/60 - 163/70)  RR: 16 (21 @ 12:47) (16 - 20)  SpO2: 97% (21 @ 12:47) (93% - 97%)  Wt(kg): --    PHYSICAL EXAM:  GENERAL: NAD, thin and elderly   HEAD:  Atraumatic, Normocephalic  EYES: EOMI, PERRLA, conjunctiva and sclera clear  ENMT: No tonsillar erythema, exudates, or enlargement; Moist mucous membranes, Good dentition, No lesions  NECK: Supple, No JVD, Normal thyroid  CHEST/LUNG: Clear to  auscultation bilaterally; No rales, rhonchi, wheezing, or rubs  bilaterally  HEART: Regular rate and rhythm; No murmurs, rubs, or gallops  ABDOMEN: Soft, Nontender, Nondistended; Bowel sounds present  EXTREMITIES:  2+ Peripheral Pulses, No clubbing, cyanosis, or edema BL LE  SKIN: No rashes or lesions  NERVOUS SYSTEM:  alert oriented times 2       LABS:                        8.7    5.45  )-----------( 172      ( 08 Aug 2021 07:11 )             27.1     08-08    142  |  110<H>  |  40<H>  ----------------------------<  81  4.9   |  23  |  1.55<H>    Ca    8.3<L>      08 Aug 2021 07:11  Phos  3.0     08-08  Mg     2.4     08-08    TPro  6.2  /  Alb  2.5<L>  /  TBili  0.4  /  DBili  x   /  AST  33  /  ALT  49  /  AlkPhos  115  08-08    PT/INR - ( 07 Aug 2021 17:38 )   PT: 12.6 sec;   INR: 1.09 ratio         PTT - ( 07 Aug 2021 17:38 )  PTT:28.4 sec  Urinalysis Basic - ( 07 Aug 2021 18:30 )    Color: Yellow / Appearance: Slightly Turbid / S.010 / pH: x  Gluc: x / Ketone: Negative  / Bili: Negative / Urobili: Negative mg/dL   Blood: x / Protein: 30 mg/dL / Nitrite: Negative   Leuk Esterase: Moderate / RBC: 0-2 /HPF / WBC 26-50   Sq Epi: x / Non Sq Epi: Occasional / Bacteria: Many      Cultures;   CAPILLARY BLOOD GLUCOSE        Lipid panel:           RADIOLOGY & ADDITIONAL TESTS:      Imaging Personally Reviewed:  [ x] YES      Consultant(s) Notes Reviewed:  [x ] YES     Care Discussed with [x ] Consultants [X ] Patient [x ] Family  [x ]    [x ]  Other; RN

## 2021-08-09 NOTE — PROGRESS NOTE ADULT - ASSESSMENT
89F with PMHx of CAD, HTN, and Parkinson's Disease presenting from [facility] for increasing confusion. There is limited collateral from daughter and [facility] unable to be reached overnight. On labs patient with pyuria and DONITA and found to be incidentally COVID-19 positive. Patient being admitted for likely infectious encephalopathy.    #Infectious Encephalopathy  -Will cover for possible UTI and aspiration with Zosyn  -Follow up blood and urine cultures  -Possibly COVID-19 is contributing to presentation, plan below   improving mentation. need to confirm baseline      #COVID-19 - incidentally positive. Patient with weak antibodies in April 2021. She was fully vaccinated on February 2021 with Pfizer. Wonder if this could be breakthrough infection?  -Currently not hypoxic, so will defer Remdesivir & steroids  -Monitor O2 saturation  -Send inflammatory markers: ferritin, D-Dimer, CRP  -DVT PPx with unfractionated heparin  8/9/2021 fever but not hypoxic. supportive care   ID consulted     #CKD stage 3 and DONITA  -Possibly pre-renal in etiology, follow up Cr in AM after 30 cc/kg bolus given by ED  -Renal US medical renal disease.   continue with gentle  ivf   8/9/2021 will get renal consult      #R/O C. Diff  - per my colleague's note he reviewed  [facility] medication list patient is on Vancomycin 125 mg daily M/W/F until 8/20 which probably means they are pulsing her --> [facility] Pharmacist to follow up    no diarrhea reported by nursing staff      #CAD  -Continue with baby aspirin and Lipitor as tolerated  -Continue with beta blocker as tolerated (on Toprol XL 25 mg daily)    #Parkinson's Disease   -Per prior notes patient is not on any medications due to poor response    PT consult    had barium swallow June 24th 2021 as outpt results noted diet ordered as recommended then  89F with PMHx of CAD, HTN, and Parkinson's Disease presenting from [facility] for increasing confusion. There is limited collateral from daughter and [facility] unable to be reached overnight. On labs patient with pyuria and DONITA and found to be incidentally COVID-19 positive. Patient being admitted for likely infectious encephalopathy.    #Infectious Encephalopathy  -Will cover for possible UTI and aspiration with Zosyn  -Follow up blood and urine cultures  -Possibly COVID-19 is contributing to presentation, plan below   improving mentation.    d/w daughter has some memory /cognitive issues      #COVID-19 - incidentally positive. Patient with weak antibodies in April 2021. She was fully vaccinated on February 2021 with Pfizer. Wonder if this could be breakthrough infection?  -Currently not hypoxic, so will defer Remdesivir & steroids  -Monitor O2 saturation  -Send inflammatory markers: ferritin, D-Dimer, CRP  -DVT PPx with unfractionated heparin  8/9/2021 fever but not hypoxic. supportive care . glen get CXR on today had some rhonci (?aspiration vs covid pna)  ID consulted     #CKD stage 3 and DONITA  -Possibly pre-renal in etiology, follow up Cr in AM after 30 cc/kg bolus given by ED  -Renal US medical renal disease.   continue with gentle  ivf   8/9/2021 will get renal consult      #R/O C. Diff  - per my colleague's note he reviewed  [facility] medication list patient is on Vancomycin 125 mg daily M/W/F until 8/20 which probably means they are pulsing her --> [facility] Pharmacist to follow up    no diarrhea reported by nursing staff      #CAD  -Continue with baby aspirin and Lipitor as tolerated  -Continue with beta blocker as tolerated (on Toprol XL 25 mg daily)    #Parkinson's Disease   -Per prior notes patient is not on any medications due to poor response      PT consult    had barium swallow June 24th 2021 as outpt results noted diet ordered as recommended then    d/w daughter via phone

## 2021-08-09 NOTE — CONSULT NOTE ADULT - SUBJECTIVE AND OBJECTIVE BOX
[facility] NEPHROLOGY SERVICES, [facility]  NEPHROLOGY AND HYPERTENSION  [address]  [address]  [place] [address]  [phone]    MD IRMA CASTANEDA MD ANDREY GONCHARUK, MD MADHU KORRAPATI, MD YELENA ROSENBERG, MD BINNY KOSHY, MD CHRISTOPHER CAPUTO, MD EDWARD BOVER, MD      Information from chart:  "Patient is a 89y old  Female who presents with a chief complaint of Confusion (09 Aug 2021 14:03)    HPI:  89F with PMHx of CAD (post-CABG), HTN, and Parkinson's Disease sent in from [facility] assisted living for increasing confusion. History is limited as patient has baseline dementia and may be acutely altered. Unable to reach [facility] and daughter not able to offer further information. It seems patient recently here in April 2021 for acute hypoxic respiratory failure secondary to aspiration. She required intubation and then discharged. Today patient is here with fever, but otherwise normal vitals. Patient possibly with UTI. She was incidentally found to be COVID-19 positive, but was fully vaccinated in February 2021 with Pfizer. Patient given 2.3 L LR bolus and Ceftriaxone. (07 Aug 2021 20:20)   "  Patient appears comfortable   No distress    PAST MEDICAL & SURGICAL HISTORY:  CAD (coronary artery disease)  s/p CABG, PPM    HTN (hypertension)    HLD (hyperlipidemia)    Alzheimer&#x27;s dementia with behavioral disturbance, unspecified timing of dementia onset    Artificial pacemaker      FAMILY HISTORY:  No pertinent family history in first degree relatives      Allergies    sulfa drugs (Unknown)    Intolerances      Home Medications:  aspirin 81 mg oral tablet: 1 tab(s) orally once a day (07 Aug 2021 20:58)  atorvastatin 20 mg oral tablet: 1 tab(s) orally once a day (at bedtime) (07 Aug 2021 20:58)  Firvanq 50 mg/mL oral liquid: 2.5 milliliter(s) orally 3 times a week (07 Aug 2021 20:58)  Metoprolol Succinate ER 25 mg oral tablet, extended release: 1 tab(s) orally once a day (07 Aug 2021 20:58)  Norvasc 2.5 mg oral tablet: 1 tab(s) orally once a day (07 Aug 2021 20:58)    MEDICATIONS  (STANDING):  amLODIPine   Tablet 2.5 milliGRAM(s) Oral daily  aspirin  chewable 81 milliGRAM(s) Oral daily  atorvastatin 20 milliGRAM(s) Oral at bedtime  heparin   Injectable 5000 Unit(s) SubCutaneous every 8 hours  metoprolol succinate ER 25 milliGRAM(s) Oral daily  piperacillin/tazobactam IVPB.. 3.375 Gram(s) IV Intermittent every 12 hours  vancomycin    Solution 125 milliGRAM(s) Oral <User Schedule>    MEDICATIONS  (PRN):  acetaminophen   Tablet .. 650 milliGRAM(s) Oral every 6 hours PRN Temp greater or equal to 38C (100.4F), Mild Pain (1 - 3), Moderate Pain (4 - 6), Severe Pain (7 - 10)  ALBUTerol    90 MICROgram(s) HFA Inhaler 2 Puff(s) Inhalation every 6 hours PRN Shortness of Breath and/or Wheezing    Vital Signs Last 24 Hrs  T(C): 38.4 (10 Aug 2021 05:47), Max: 38.4 (10 Aug 2021 05:47)  T(F): 101.1 (10 Aug 2021 05:47), Max: 101.1 (10 Aug 2021 05:47)  HR: 65 (10 Aug 2021 05:47) (60 - 83)  BP: 106/72 (10 Aug 2021 05:47) (106/72 - 177/94)  BP(mean): --  RR: 18 (10 Aug 2021 05:47) (17 - 18)  SpO2: 93% (10 Aug 2021 05:47) (93% - 96%)    Daily     Daily     CAPILLARY BLOOD GLUCOSE        PHYSICAL EXAM:      T(C): 38.4 (08-10-21 @ 05:47), Max: 38.4 (08-10-21 @ 05:47)  HR: 65 (08-10-21 @ 05:47) (60 - 83)  BP: 106/72 (08-10-21 @ 05:47) (106/72 - 177/94)  RR: 18 (08-10-21 @ 05:47) (17 - 18)  SpO2: 93% (08-10-21 @ 05:47) (93% - 96%)  Wt(kg): --  Lungs clear  Heart S1S2  Abd soft NT ND  Extremities:   tr edema              08-10    143  |  112<H>  |  35<H>  ----------------------------<  113<H>  3.7   |  22  |  1.69<H>    Ca    7.8<L>      10 Aug 2021 07:43  Phos  3.1     08-10  Mg     2.0     08-10                            9.9    7.18  )-----------( 197      ( 09 Aug 2021 06:45 )             31.6     Creatinine Trend: 1.69<--, 1.83<--, 1.55<--, 1.56<--    cefTRIAXone   IVPB   100 mL/Hr IV Intermittent (08-07-21 @ 18:45)    piperacillin/tazobactam IVPB.   200 mL/Hr IV Intermittent (08-07-21 @ 21:26)    piperacillin/tazobactam IVPB..   25 mL/Hr IV Intermittent (08-09-21 @ 07:09)   25 mL/Hr IV Intermittent (08-08-21 @ 22:46)   25 mL/Hr IV Intermittent (08-08-21 @ 14:36)   25 mL/Hr IV Intermittent (08-08-21 @ 07:04)   25 mL/Hr IV Intermittent (08-07-21 @ 23:20)    piperacillin/tazobactam IVPB..   25 mL/Hr IV Intermittent (08-10-21 @ 05:39)   25 mL/Hr IV Intermittent (08-09-21 @ 18:10)    vancomycin    Solution   125 milliGRAM(s) Oral (08-09-21 @ 10:56)      Trend Bun/Cr  08-10-21 @ 07:43  BUN/CR -  35<H> / 1.69<H>  08-09-21 @ 06:45  BUN/CR -  41<H> / 1.83<H>  08-08-21 @ 07:11  BUN/CR -  40<H> / 1.55<H>  08-07-21 @ 17:38  BUN/CR -  52<H> / 1.56<H>  04-28-21 @ 08:21  BUN/CR -  30<H> / 1.15  04-27-21 @ 12:41  BUN/CR -  36<H> / 1.51<H>  04-27-21 @ 08:15  BUN/CR -  38<H> / 1.32<H>  04-26-21 @ 06:44  BUN/CR -  44<H> / 1.50<H>  04-25-21 @ 07:24  BUN/CR -  46<H> / 1.63<H>  04-24-21 @ 07:26  BUN/CR -  46<H> / 1.75<H>  04-23-21 @ 07:11  BUN/CR -  42<H> / 1.82<H>  04-22-21 @ 08:23  BUN/CR -  37<H> / 2.00<H>      Assessment   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

## 2021-08-09 NOTE — PROGRESS NOTE ADULT - SUBJECTIVE AND OBJECTIVE BOX
Patient is a 89y old  Female who presents with a chief complaint of Confusion (07 Aug 2021 20:20)      OVERNIGHT EVENTS:        MEDICATIONS  (STANDING):  amLODIPine   Tablet 2.5 milliGRAM(s) Oral daily  aspirin  chewable 81 milliGRAM(s) Oral daily  atorvastatin 20 milliGRAM(s) Oral at bedtime  heparin   Injectable 5000 Unit(s) SubCutaneous every 8 hours  metoprolol succinate ER 25 milliGRAM(s) Oral daily  piperacillin/tazobactam IVPB.. 3.375 Gram(s) IV Intermittent every 12 hours  vancomycin    Solution 125 milliGRAM(s) Oral <User Schedule>    MEDICATIONS  (PRN):  acetaminophen   Tablet .. 650 milliGRAM(s) Oral every 6 hours PRN Temp greater or equal to 38C (100.4F), Mild Pain (1 - 3), Moderate Pain (4 - 6), Severe Pain (7 - 10)  ALBUTerol    90 MICROgram(s) HFA Inhaler 2 Puff(s) Inhalation every 6 hours PRN Shortness of Breath and/or Wheezing      Allergies    sulfa drugs (Unknown)    Intolerances        SUBJECTIVE: in bed in NAD, no acute events overnight     Vital Signs Last 24 Hrs  T(C): 37.2 (09 Aug 2021 12:41), Max: 38.8 (09 Aug 2021 00:00)  T(F): 98.9 (09 Aug 2021 12:41), Max: 101.9 (09 Aug 2021 00:00)  HR: 60 (09 Aug 2021 12:41) (60 - 71)  BP: 144/64 (09 Aug 2021 12:41) (144/64 - 162/82)  BP(mean): --  RR: 18 (09 Aug 2021 12:41) (17 - 18)  SpO2: 95% (09 Aug 2021 12:41) (90% - 95%)    PHYSICAL EXAM:  GENERAL: NAD, elderly   HEAD:  Atraumatic, Normocephalic  EYES: EOMI, PERRLA, conjunctiva and sclera clear  ENMT: No tonsillar erythema, exudates, or enlargement; Moist mucous membranes, Good dentition, No lesions  NECK: Supple, No JVD, Normal thyroid  CHEST/LUNG: Clear to  auscultation bilaterally; No rales, rhonchi, wheezing, or rubs  bilaterally  HEART: Regular rate and rhythm; No murmurs, rubs, or gallops  ABDOMEN: Soft, Nontender, Nondistended; Bowel sounds present  EXTREMITIES:  2+ Peripheral Pulses, No clubbing, cyanosis, or edema BL LE  SKIN: No rashes or lesions  NERVOUS SYSTEM:  alert oriented times 2       LABS:                                          9.9    7.18  )-----------( 197      ( 09 Aug 2021 06:45 )             31.6   08-09    142  |  109<H>  |  41<H>  ----------------------------<  82  4.6   |  22  |  1.83<H>    Ca    8.4<L>      09 Aug 2021 06:45  Phos  3.7     08-09  Mg     2.3     08-09    TPro  6.2  /  Alb  2.5<L>  /  TBili  0.4  /  DBili  x   /  AST  33  /  ALT  49  /  AlkPhos  115  08-08       Culture - Blood (collected 08 Aug 2021 00:45)  Source: .Blood Blood-Peripheral  Preliminary Report (09 Aug 2021 01:02):    No growth to date.    Culture - Blood (collected 08 Aug 2021 00:45)  Source: .Blood Blood-Peripheral  Preliminary Report (09 Aug 2021 01:02):    No growth to date.    Culture - Urine (collected 08 Aug 2021 00:42)  Source: Clean Catch Clean Catch (Midstream)  Preliminary Report (09 Aug 2021 12:45):    >100,000 CFU/ml Escherichia coli        Cultures;   CAPILLARY BLOOD GLUCOSE        Lipid panel:           RADIOLOGY & ADDITIONAL TESTS:      Imaging Personally Reviewed:  [ x] YES      Consultant(s) Notes Reviewed:  [x ] YES     Care Discussed with [x ] Consultants [X ] Patient [x ] Family  [x ]    [x ]  Other; RN Patient is a 89y old  Female who presents with a chief complaint of Confusion (07 Aug 2021 20:20)      OVERNIGHT EVENTS:        MEDICATIONS  (STANDING):  amLODIPine   Tablet 2.5 milliGRAM(s) Oral daily  aspirin  chewable 81 milliGRAM(s) Oral daily  atorvastatin 20 milliGRAM(s) Oral at bedtime  heparin   Injectable 5000 Unit(s) SubCutaneous every 8 hours  metoprolol succinate ER 25 milliGRAM(s) Oral daily  piperacillin/tazobactam IVPB.. 3.375 Gram(s) IV Intermittent every 12 hours  vancomycin    Solution 125 milliGRAM(s) Oral <User Schedule>    MEDICATIONS  (PRN):  acetaminophen   Tablet .. 650 milliGRAM(s) Oral every 6 hours PRN Temp greater or equal to 38C (100.4F), Mild Pain (1 - 3), Moderate Pain (4 - 6), Severe Pain (7 - 10)  ALBUTerol    90 MICROgram(s) HFA Inhaler 2 Puff(s) Inhalation every 6 hours PRN Shortness of Breath and/or Wheezing      Allergies    sulfa drugs (Unknown)    Intolerances        SUBJECTIVE: in bed in NAD, no acute events overnight     Vital Signs Last 24 Hrs  T(C): 37.2 (09 Aug 2021 12:41), Max: 38.8 (09 Aug 2021 00:00)  T(F): 98.9 (09 Aug 2021 12:41), Max: 101.9 (09 Aug 2021 00:00)  HR: 60 (09 Aug 2021 12:41) (60 - 71)  BP: 144/64 (09 Aug 2021 12:41) (144/64 - 162/82)  BP(mean): --  RR: 18 (09 Aug 2021 12:41) (17 - 18)  SpO2: 95% (09 Aug 2021 12:41) (90% - 95%)    PHYSICAL EXAM:  GENERAL: NAD, elderly   HEAD:  Atraumatic, Normocephalic  EYES: EOMI, PERRLA, conjunctiva and sclera clear  ENMT: No tonsillar erythema, exudates, or enlargement; Moist mucous membranes, Good dentition, No lesions  NECK: Supple, No JVD, Normal thyroid  CHEST/LUNG:  mild scattered rhonchi,   HEART: Regular rate and rhythm; No murmurs, rubs, or gallops  ABDOMEN: Soft, Nontender, Nondistended; Bowel sounds present  EXTREMITIES:  2+ Peripheral Pulses, No clubbing, cyanosis, or edema BL LE  SKIN: No rashes or lesions  NERVOUS SYSTEM:  alert oriented times 2 , essential tremor, moves all extremities       LABS:                                          9.9    7.18  )-----------( 197      ( 09 Aug 2021 06:45 )             31.6   08-09    142  |  109<H>  |  41<H>  ----------------------------<  82  4.6   |  22  |  1.83<H>    Ca    8.4<L>      09 Aug 2021 06:45  Phos  3.7     08-09  Mg     2.3     08-09    TPro  6.2  /  Alb  2.5<L>  /  TBili  0.4  /  DBili  x   /  AST  33  /  ALT  49  /  AlkPhos  115  08-08       Culture - Blood (collected 08 Aug 2021 00:45)  Source: .Blood Blood-Peripheral  Preliminary Report (09 Aug 2021 01:02):    No growth to date.    Culture - Blood (collected 08 Aug 2021 00:45)  Source: .Blood Blood-Peripheral  Preliminary Report (09 Aug 2021 01:02):    No growth to date.    Culture - Urine (collected 08 Aug 2021 00:42)  Source: Clean Catch Clean Catch (Midstream)  Preliminary Report (09 Aug 2021 12:45):    >100,000 CFU/ml Escherichia coli        Cultures;   CAPILLARY BLOOD GLUCOSE        Lipid panel:           RADIOLOGY & ADDITIONAL TESTS:      Imaging Personally Reviewed:  [ x] YES      Consultant(s) Notes Reviewed:  [x ] YES     Care Discussed with [x ] Consultants [X ] Patient [x ] Family  [x ]    [x ]  Other; RN

## 2021-08-10 NOTE — PROGRESS NOTE ADULT - ASSESSMENT
89F with PMHx of CAD, HTN, and Parkinson's Disease presenting from [facility] for increasing confusion. There is limited collateral from daughter and [facility] unable to be reached overnight. On labs patient with pyuria and DONITA and found to be incidentally COVID-19 positive. Patient being admitted for likely infectious encephalopathy.    #Infectious Encephalopathy  -Will cover for possible UTI and aspiration with Zosyn  -Follow up blood and urine cultures  -Possibly COVID-19 is contributing to presentation, plan below   improving mentation.    d/w daughter has some memory /cognitive issues      #COVID-19 - incidentally positive. Patient with weak antibodies in April 2021. She was fully vaccinated on February 2021 with Pfizer. Wonder if this could be breakthrough infection?  -Currently not hypoxic, so will defer Remdesivir & steroids  -Monitor O2 saturation  -Send inflammatory markers: ferritin, D-Dimer, CRP  -DVT PPx with unfractionated heparin  8/9/2021 fever but not hypoxic. supportive care . glen get CXR on today had some rhonci (?aspiration vs covid pna)  ID consulted   8/10/2021 f/u official CXR read. appears developing a  right side infiltrate    #CKD stage 3 and DONITA  -Possibly pre-renal in etiology, follow up Cr in AM after 30 cc/kg bolus given by ED  -Renal US medical renal disease.   continue with gentle  ivf   8/9/2021 will get renal consult    8/10/2021 improving creatinine/ f/u renal consult    #R/O C. Diff  - per my colleague's note he reviewed  [facility] medication list patient is on Vancomycin 125 mg daily M/W/F until 8/20 which probably means they are pulsing her --> [facility] Pharmacist to follow up    no diarrhea reported by nursing staff      #CAD  -Continue with baby aspirin and Lipitor as tolerated  -Continue with beta blocker as tolerated (on Toprol XL 25 mg daily)    #Parkinson's Disease   -Per prior notes patient is not on any medications due to poor response      PT consult    had barium swallow June 24th 2021 as outpt results noted diet ordered as recommended then    d/w daughter via phone

## 2021-08-10 NOTE — CONSULT NOTE ADULT - NSCONSULTADDITIONALINFOA_GEN_ALL_CORE
Reviewed with Dr. العلي    Thank you for the courtesy of this referral.  Randy Salas MD  Attending Physician  [facility]  Division of Infectious Diseases  [phone]

## 2021-08-10 NOTE — CONSULT NOTE ADULT - ASSESSMENT
Patient with fever.   If fever not from COVID, then in theory would be a candidate for monoclonal antibody. However it is possible that this represents a breakthrough infection in a frail elderly individual. Patients requiring inpatient care/hospitalization for COVID did not benefit from MCAB infusion. With CXR showing possible new RLL infiltrate patient could certainly could have fever from COVID. Fortunately individuals vaccinated have a markedly reduced mortality rate compared with unvaccinated individuals. Patient is not a candidate for RDV or Dex at this time.    Patient could have aspiration pneumonia, though relatively low suspicion. She has pyuria and + urine cx, but also epithelial cells on U/A indicating a degree of external contamination. History here is inadequate to discern between and the presence/absence of urinary symptoms.

## 2021-08-10 NOTE — SWALLOW BEDSIDE ASSESSMENT ADULT - PHARYNGEAL PHASE
Delayed pharyngeal swallow/Decreased laryngeal elevation Delayed pharyngeal swallow/Decreased laryngeal elevation/Cough post oral intake Delayed pharyngeal swallow/Decreased laryngeal elevation/Wet vocal quality post oral intake

## 2021-08-10 NOTE — CONSULT NOTE ADULT - SUBJECTIVE AND OBJECTIVE BOX
[facility]  Division of Infectious Diseases  [phone]    LEANDRO YAÑEZ  89y, Female  [id number]    HPI--  89F, awake, alert, and interactive but not a reliable historian. No recall as to events, year =1926, states born 1924, does not know where she is.   As per HPI:  89F with PMHx of CAD (post-CABG), HTN, and Parkinson's Disease sent in from [facility] assisted living for increasing confusion. History is limited as patient has baseline dementia and may be acutely altered. Unable to reach [facility] and daughter not able to offer further information. It seems patient recently here in April 2021 for acute hypoxic respiratory failure secondary to aspiration. She required intubation and then discharged. Today patient is here with fever, but otherwise normal vitals. Patient possibly with UTI. She was incidentally found to be COVID-19 positive, but was fully vaccinated in February 2021 with Pfizer. Patient given 2.3 L LR bolus and Ceftriaxone. (07 Aug 2021 20:20)    Patient here treated vs. possible UTI and aspiration pneumonia. She is comfortable on RA and for what it's worth, without complaints.      PMH/PSH--  CAD (coronary artery disease)    HTN (hypertension)    HLD (hyperlipidemia)    Alzheimer&#x27;s dementia with behavioral disturbance, unspecified timing of dementia onset    Artificial pacemaker        Allergies--  sulfa drugs (Unknown)      Medications--  Antibiotics: piperacillin/tazobactam IVPB.. 3.375 Gram(s) IV Intermittent every 12 hours  vancomycin    Solution 125 milliGRAM(s) Oral <User Schedule>    Immunologic:   Other: acetaminophen   Tablet .. PRN  ALBUTerol    90 MICROgram(s) HFA Inhaler PRN  amLODIPine   Tablet  aspirin  chewable  atorvastatin  heparin   Injectable  metoprolol succinate ER    Antimicrobials last 90 days per EMR: MEDICATIONS  (STANDING):  cefTRIAXone   IVPB   100 mL/Hr IV Intermittent (08-07-21 @ 18:45)    piperacillin/tazobactam IVPB.   200 mL/Hr IV Intermittent (08-07-21 @ 21:26)    piperacillin/tazobactam IVPB..   25 mL/Hr IV Intermittent (08-09-21 @ 07:09)   25 mL/Hr IV Intermittent (08-08-21 @ 22:46)   25 mL/Hr IV Intermittent (08-08-21 @ 14:36)   25 mL/Hr IV Intermittent (08-08-21 @ 07:04)   25 mL/Hr IV Intermittent (08-07-21 @ 23:20)    piperacillin/tazobactam IVPB..   25 mL/Hr IV Intermittent (08-10-21 @ 05:39)   25 mL/Hr IV Intermittent (08-09-21 @ 18:10)    vancomycin    Solution   125 milliGRAM(s) Oral (08-09-21 @ 10:56)      Social History--  EtOH: none known.  Tobacco: none known.  Drug use: none known.     Family/Marital History--  No pertinent family history in first degree relatives    No pertinent family history in first degree relatives      Review of Systems:  Review of systems unable secondary to clinical condition.       Physical Exam--  Vital Signs: T(F): 99 (08-10-21 @ 11:33), Max: 101.1 (08-10-21 @ 05:47)  HR: 66 (08-10-21 @ 11:33)  BP: 111/61 (08-10-21 @ 11:33)  RR: 17 (08-10-21 @ 11:33)  SpO2: 95% (08-10-21 @ 11:33)  Wt(kg): --  General: Nontoxic-appearing Female in no acute distress.  HEENT: AT/NC. Anicteric. Conjunctiva pink and moist. Oropharynx dry  Neck: Not rigid. No sense of mass.  Nodes: None palpable.  Lungs: Poor effort, grossly clear bilaterally  Chest: PPM LACW C/D/I  Heart: Regular rate and rhythm. No Murmur. No rub. No gallop. No palpable thrill.  Abdomen: Bowel sounds present and normoactive. Soft. Nondistended. Nontender. No sense of mass. No organomegaly.  Extremities: No cyanosis or clubbing. No edema.   Skin: Warm. Dry. Good turgor. No rash. No vasculitic stigmata.  Psychiatric: Disoriened as above, grossly appropriate affect and mood for situation.         Laboratory & Imaging Data--  CBC                        9.9    7.18  )-----------( 197      ( 09 Aug 2021 06:45 )             31.6     WBC trend  WBC Count: 7.18 K/uL (08-09-21 @ 06:45)  WBC Count: 5.45 K/uL (08-08-21 @ 07:11)  WBC Count: 8.44 K/uL (08-07-21 @ 17:38)      Chemistries  08-10    143  |  112<H>  |  35<H>  ----------------------------<  113<H>  3.7   |  22  |  1.69<H>    Creatinine Trend  1.69 mg/dL (08-10-21 @ 07:43)  1.83 mg/dL (08-09-21 @ 06:45)  1.55 mg/dL (08-08-21 @ 07:11)  1.56 mg/dL (08-07-21 @ 17:38)    Ca    7.8<L>      10 Aug 2021 07:43  Phos  3.1     08-10  Mg     2.0     08-10      Urinalysis (08.07.21 @ 18:30)    pH Urine: 7.0    Glucose Qualitative, Urine: Negative mg/dL    Blood, Urine: Trace    Color: Yellow    Urine Appearance: Slightly Turbid    Bilirubin: Negative    Ketone - Urine: Negative    Specific Gravity: 1.010    Protein, Urine: 30 mg/dL    Urobilinogen: Negative mg/dL    Nitrite: Negative    Leukocyte Esterase Concentration: Moderate  Urine Microscopic-Add On (NC) (08.07.21 @ 18:30)    Bacteria: Many    Epithelial Cells: Occasional    White Blood Cell - Urine: 26-50    Red Blood Cell - Urine: 0-2 /HPF      Culture Data    Culture - Blood (collected 08 Aug 2021 00:45)  Source: .Blood Blood-Peripheral  Preliminary Report (09 Aug 2021 01:02):    No growth to date.    Culture - Blood (collected 08 Aug 2021 00:45)  Source: .Blood Blood-Peripheral  Preliminary Report (09 Aug 2021 01:02):    No growth to date.    Culture - Urine (collected 08 Aug 2021 00:42)  Source: Clean Catch Clean Catch (Midstream)  Final Report (10 Aug 2021 11:56):    >100,000 CFU/ml Escherichia coli  Organism: Escherichia coli (10 Aug 2021 11:56)  Organism: Escherichia coli (10 Aug 2021 11:56)    CXRs (personally reviewed)-  8/9 not officially read- possible RML/RLL infiltrate on my review. No effusions/other infiltrates present.     < from: Xray Chest 1 View-PORTABLE IMMEDIATE (08.07.21 @ 17:28) >  Comparison: 04/19/2021    Findings/  Impression: Status post CABG and left pacemaker. Lungs are clear.    --- End of Report ---      < end of copied text >

## 2021-08-10 NOTE — PHYSICAL THERAPY INITIAL EVALUATION ADULT - PERTINENT HX OF CURRENT PROBLEM, REHAB EVAL
Patient is a 89 y/o female admitted to [facility] due to delirium, acute pyelonephritis, +Covid 19.

## 2021-08-10 NOTE — PHYSICAL THERAPY INITIAL EVALUATION ADULT - ADDITIONAL COMMENTS
As per chart, pt lives in an assisted living facility. Required assistance with ADL's and ambulation using rolling walker/rollator. Pt also has own wheelchair.

## 2021-08-10 NOTE — PROGRESS NOTE ADULT - SUBJECTIVE AND OBJECTIVE BOX
Patient is a 89y old  Female who presents with a chief complaint of Confusion (07 Aug 2021 20:20)      OVERNIGHT EVENTS:        MEDICATIONS  (STANDING):  amLODIPine   Tablet 2.5 milliGRAM(s) Oral daily  aspirin  chewable 81 milliGRAM(s) Oral daily  atorvastatin 20 milliGRAM(s) Oral at bedtime  heparin   Injectable 5000 Unit(s) SubCutaneous every 8 hours  metoprolol succinate ER 25 milliGRAM(s) Oral daily  piperacillin/tazobactam IVPB.. 3.375 Gram(s) IV Intermittent every 12 hours  vancomycin    Solution 125 milliGRAM(s) Oral <User Schedule>    MEDICATIONS  (PRN):  acetaminophen   Tablet .. 650 milliGRAM(s) Oral every 6 hours PRN Temp greater or equal to 38C (100.4F), Mild Pain (1 - 3), Moderate Pain (4 - 6), Severe Pain (7 - 10)  ALBUTerol    90 MICROgram(s) HFA Inhaler 2 Puff(s) Inhalation every 6 hours PRN Shortness of Breath and/or Wheezing    Allergies    sulfa drugs (Unknown)    Intolerances        SUBJECTIVE: in bed in NAD, no acute events overnight     Vital Signs Last 24 Hrs  T(C): 37.2 (10 Aug 2021 11:33), Max: 38.4 (10 Aug 2021 05:47)  T(F): 99 (10 Aug 2021 11:33), Max: 101.1 (10 Aug 2021 05:47)  HR: 66 (10 Aug 2021 11:33) (65 - 83)  BP: 111/61 (10 Aug 2021 11:33) (106/72 - 177/94)  BP(mean): --  RR: 17 (10 Aug 2021 11:33) (17 - 18)  SpO2: 95% (10 Aug 2021 11:33) (93% - 96%)    PHYSICAL EXAM:  GENERAL: NAD, elderly   HEAD:  Atraumatic, Normocephalic  EYES: EOMI, PERRLA, conjunctiva and sclera clear  ENMT: No tonsillar erythema, exudates, or enlargement; Moist mucous membranes, Good dentition, No lesions  NECK: Supple, No JVD, Normal thyroid  CHEST/LUNG:  mild scattered rhonchi,   HEART: Regular rate and rhythm; No murmurs, rubs, or gallops  ABDOMEN: Soft, Nontender, Nondistended; Bowel sounds present  EXTREMITIES:  2+ Peripheral Pulses, No clubbing, cyanosis, or edema BL LE  SKIN: No rashes or lesions  NERVOUS SYSTEM:  alert oriented times 2 , essential tremor, moves all extremities       LABS:                                                 9.9    7.18  )-----------( 197      ( 09 Aug 2021 06:45 )             31.6   08-10    143  |  112<H>  |  35<H>  ----------------------------<  113<H>  3.7   |  22  |  1.69<H>    Ca    7.8<L>      10 Aug 2021 07:43  Phos  3.1     08-10  Mg     2.0     08-10      Culture - Blood (collected 08 Aug 2021 00:45)  Source: .Blood Blood-Peripheral  Preliminary Report (09 Aug 2021 01:02):    No growth to date.    Culture - Blood (collected 08 Aug 2021 00:45)  Source: .Blood Blood-Peripheral  Preliminary Report (09 Aug 2021 01:02):    No growth to date.    Culture - Urine (collected 08 Aug 2021 00:42)  Source: Clean Catch Clean Catch (Midstream)  Preliminary Report (09 Aug 2021 12:45):    >100,000 CFU/ml Escherichia coli        Cultures;   CAPILLARY BLOOD GLUCOSE        Lipid panel:           RADIOLOGY & ADDITIONAL TESTS:      Imaging Personally Reviewed:  [ x] YES      Consultant(s) Notes Reviewed:  [x ] YES     Care Discussed with [x ] Consultants [X ] Patient [x ] Family  [x ]    [x ]  Other; RN

## 2021-08-10 NOTE — PHYSICAL THERAPY INITIAL EVALUATION ADULT - GENERAL OBSERVATIONS, REHAB EVAL
Chart (EMR) reviewed. +Covid 19 precautions. Received supine c HOB elevated, NAD. Lethargic but arousable. Ox1. Able to follow simple commands/directions.

## 2021-08-11 NOTE — PROGRESS NOTE ADULT - PROBLEM SELECTOR PLAN 1
Precautions per protocol, ideally airborne and contact in negative pressure room  Supplemental oxygen as required to maintain adequate saturation.  Avoid NIPPV, high flow O2, nebulizers, or other interventions which may increase the likelihood of aerosolization as much as feasible  High threshold for antibiotic use  Vigilance for secondary infection and other superimposed events  Monitor inflammatory markers and lab data  DVT prophylaxis per protocol.  I do not think patient is appropriate for MCAB at this time  She does not merit treatment with RDV or Dex at this time.

## 2021-08-11 NOTE — PROGRESS NOTE ADULT - SUBJECTIVE AND OBJECTIVE BOX
[facility]  Division of Infectious Diseases  [phone]    Name: LEANDRO YAÑEZ  Age: 89y  Gender: Female  MRN: [id number]    Interval History--  Notes reviewed. Seen earlier today. Oriented to self only, no change.     Past Medical History--  CAD (coronary artery disease)    HTN (hypertension)    HLD (hyperlipidemia)    Alzheimer&#x27;s dementia with behavioral disturbance, unspecified timing of dementia onset    Artificial pacemaker        For details regarding the patient's social history, family history, and other miscellaneous elements, please refer the initial infectious diseases consultation and/or the admitting history and physical examination for this admission.    Allergies    sulfa drugs (Unknown)    Intolerances        Medications--  Antibiotics:  piperacillin/tazobactam IVPB.. 3.375 Gram(s) IV Intermittent every 12 hours  vancomycin    Solution 125 milliGRAM(s) Oral <User Schedule>    Immunologic:    Other:  acetaminophen   Tablet .. PRN  ALBUTerol    90 MICROgram(s) HFA Inhaler PRN  amLODIPine   Tablet  aspirin  chewable  atorvastatin  heparin   Injectable  metoprolol succinate ER      Review of Systems--  Review of systems unable due to dementia.     Physical Examination--  Vital Signs: T(F): 98.5 (08-11-21 @ 17:00), Max: 101.4 (08-11-21 @ 12:17)  HR: 65 (08-11-21 @ 17:00)  BP: 141/64 (08-11-21 @ 17:00)  RR: 17 (08-11-21 @ 17:00)  SpO2: 94% (08-11-21 @ 17:00)  Wt(kg): --  General: Nontoxic-appearing Female in no acute distress.  HEENT: AT/NC. PERRL. EOMI. Anicteric. Conjunctiva pink and moist. Oropharynx clear. Dentition fair.  Neck: Not rigid. No sense of mass.  Nodes: None palpable.  Lungs: Clear bilaterally without rales, wheezing or rhonchi  Heart: Regular rate and rhythm. No Murmur. No rub. No gallop. No palpable thrill.  Abdomen: Bowel sounds present and normoactive. Soft. Nondistended. Nontender. No sense of mass. No organomegaly.  Back: No spinal tenderness. No costovertebral angle tenderness.   Extremities: No cyanosis or clubbing. No edema.   Skin: Warm. Dry. Good turgor. No rash. No vasculitic stigmata.  Psychiatric: Appropriate affect and mood for situation.         Laboratory Studies--  CBC      Chemistries  08-10    143  |  112<H>  |  35<H>  ----------------------------<  113<H>  3.7   |  22  |  1.69<H>    Creatinine Trend  1.69 mg/dL (08-10-21 @ 07:43)  1.83 mg/dL (08-09-21 @ 06:45)  1.55 mg/dL (08-08-21 @ 07:11)  1.56 mg/dL (08-07-21 @ 17:38)      Ca    7.8<L>      10 Aug 2021 07:43  Phos  3.1     08-10  Mg     2.0     08-10        Culture Data    Culture - Blood (collected 08 Aug 2021 00:45)  Source: .Blood Blood-Peripheral  Preliminary Report (09 Aug 2021 01:02):    No growth to date.    Culture - Blood (collected 08 Aug 2021 00:45)  Source: .Blood Blood-Peripheral  Preliminary Report (09 Aug 2021 01:02):    No growth to date.    Culture - Urine (collected 08 Aug 2021 00:42)  Source: Clean Catch Clean Catch (Midstream)  Final Report (10 Aug 2021 11:56):    >100,000 CFU/ml Escherichia coli  Organism: Escherichia coli (10 Aug 2021 11:56)  Organism: Escherichia coli (10 Aug 2021 11:56)      < from: Xray Chest 1 View- PORTABLE-Routine (Xray Chest 1 View- PORTABLE-Routine .) (08.09.21 @ 19:39) >  IMPRESSION:  RIGHT lung base focal airspace disease. . Cardiomegaly.      < end of copied text >

## 2021-08-11 NOTE — PROGRESS NOTE ADULT - ASSESSMENT
89F with PMHx of CAD, HTN, and Parkinson's Disease presenting from [facility] for increasing confusion. There is limited collateral from daughter and [facility] unable to be reached overnight. On labs patient with pyuria and DONITA and found to be incidentally COVID-19 positive. Patient being admitted for likely infectious encephalopathy.    #acute metabolic Encephalopathy  acute UTI and Gram-neg pneumonia with Zosyn  -Follow up blood and urine cultures  -Possibly COVID-19 is contributing to presentation, plan below   improving mentation.    d/w daughter has some memory /cognitive issues    #COVID-19 pneumonia- incidentally positive. Patient with weak antibodies in April 2021. She was fully vaccinated on February 2021 with Pfizer. Wonder if this could be breakthrough infection?  -Currently not hypoxic, so will defer Remdesivir & steroids  -Monitor O2 saturation  -Send inflammatory markers: ferritin, D-Dimer, CRP  -DVT PPx with unfractionated heparin  8/9/2021 fever but not hypoxic. supportive care . 8/10/2021 f/u official CXR read. appears developing a  right side infiltrate  She does not merit treatment with RDV or Dex at this time per ID    #CKD stage 3 and DONITA  -Possibly pre-renal in etiology, follow up Cr in AM after 30 cc/kg bolus given by ED  -Renal US medical renal disease.   continue with gentle  ivf   8/9/2021 will get renal consult    8/10/2021 improving creatinine/ f/u renal consult    Presumed past C. Diff  - per my colleague's note he reviewed  [facility] medication list patient is on Vancomycin 125 mg daily M/W/F until 8/20 which probably means they are pulsing her --> [facility] Pharmacist to follow up  no diarrhea reported by nursing staff    #CAD  -Continue with baby aspirin and Lipitor as tolerated  -Continue with beta blocker as tolerated (on Toprol XL 25 mg daily)    #Parkinson's Disease   -Per prior notes patient is not on any medications due to poor response    PT consult    had barium swallow June 24th 2021 as outpt results noted diet ordered as recommended then    d/w daughter via phone

## 2021-08-11 NOTE — PROGRESS NOTE ADULT - ASSESSMENT
Patient with fever.   If fever not from COVID, then in theory would be a candidate for monoclonal antibody. However it is possible that this represents a breakthrough infection in a frail elderly individual. Patients requiring inpatient care/hospitalization for COVID did not benefit from MCAB infusion. With CXR showing possible new RLL infiltrate patient could certainly could have fever from COVID. Fortunately individuals vaccinated have a markedly reduced mortality rate compared with unvaccinated individuals. Patient is not a candidate for RDV or Dex at this time.    Patient could have aspiration pneumonia, though relatively low suspicion. She has pyuria and + urine cx, but also epithelial cells on U/A indicating a degree of external contamination. History here is inadequate to discern between and the presence/absence of urinary symptoms.     8/11: Urine isolate S zosyn, at risk for aspiration with new focal R sided infiltrate. +COvID but if RLL infiltrate  from covid rather than aspiration would not be a candidate for MCAB. Not hypoxic to indicate need for RDV/Dex.

## 2021-08-11 NOTE — PROGRESS NOTE ADULT - SUBJECTIVE AND OBJECTIVE BOX
Patient is a 89y old  Female who presents with a chief complaint of Confusion (10 Aug 2021 15:20)      OVERNIGHT EVENTS:  +fever    REVIEW OF SYSTEMS: poor historian    MEDICATIONS  (STANDING):  amLODIPine   Tablet 2.5 milliGRAM(s) Oral daily  aspirin  chewable 81 milliGRAM(s) Oral daily  atorvastatin 20 milliGRAM(s) Oral at bedtime  heparin   Injectable 5000 Unit(s) SubCutaneous every 8 hours  metoprolol succinate ER 25 milliGRAM(s) Oral daily  piperacillin/tazobactam IVPB.. 3.375 Gram(s) IV Intermittent every 12 hours  vancomycin    Solution 125 milliGRAM(s) Oral <User Schedule>    MEDICATIONS  (PRN):  acetaminophen   Tablet .. 650 milliGRAM(s) Oral every 6 hours PRN Temp greater or equal to 38C (100.4F), Mild Pain (1 - 3), Moderate Pain (4 - 6), Severe Pain (7 - 10)  ALBUTerol    90 MICROgram(s) HFA Inhaler 2 Puff(s) Inhalation every 6 hours PRN Shortness of Breath and/or Wheezing      Allergies    sulfa drugs (Unknown)    Intolerances        T(F): 101.4 (08-11-21 @ 12:17), Max: 101.4 (08-11-21 @ 12:17)  HR: 90 (08-11-21 @ 12:17) (60 - 90)  BP: 122/81 (08-11-21 @ 12:17) (122/81 - 181/76)  RR: 17 (08-11-21 @ 12:17) (17 - 19)  SpO2: 93% (08-11-21 @ 12:17) (93% - 97%)  Wt(kg): --    PHYSICAL EXAM:  GENERAL: NAD  HEAD:  Atraumatic, Normocephalic  EYES: PERRLA, conjunctiva and sclera clear  ENMT: Moist mucous membranes  NECK: Supple, No JVD, Normal thyroid  NERVOUS SYSTEM:  Alert & Awake  CHEST/LUNG: Clear to percussion bilaterally;   HEART: Regular rate and rhythm;   ABDOMEN: Soft, Nontender, Nondistended; Bowel sounds present  EXTREMITIES:  no edema BL LE  SKIN: moist    LABS:    08-10    143  |  112<H>  |  35<H>  ----------------------------<  113<H>  3.7   |  22  |  1.69<H>    Ca    7.8<L>      10 Aug 2021 07:43  Phos  3.1     08-10  Mg     2.0     08-10          Cultures;   CAPILLARY BLOOD GLUCOSE        Lipid panel:           RADIOLOGY & ADDITIONAL TESTS:    Imaging Personally Reviewed:  [x ] YES      Consultant(s) Notes Reviewed:  [x ] YES     Care Discussed with [x ] Consultants [X ] Patient [ ] Family  [x ]    [x ]  Other; RN

## 2021-08-12 NOTE — PROGRESS NOTE ADULT - SUBJECTIVE AND OBJECTIVE BOX
Patient is a 89y old  Female who presents with a chief complaint of Confusion (11 Aug 2021 19:37)      OVERNIGHT EVENTS:    REVIEW OF SYSTEMS: denies chest pain/SOB, diaphoresis, no F/C, cough, dizziness, headache, blurry vision, nausea, vomiting, abdominal pain. All others review of systems negative     MEDICATIONS  (STANDING):  amLODIPine   Tablet 2.5 milliGRAM(s) Oral daily  aspirin  chewable 81 milliGRAM(s) Oral daily  atorvastatin 20 milliGRAM(s) Oral at bedtime  heparin   Injectable 5000 Unit(s) SubCutaneous every 8 hours  metoprolol succinate ER 25 milliGRAM(s) Oral daily  vancomycin    Solution 125 milliGRAM(s) Oral <User Schedule>    MEDICATIONS  (PRN):  acetaminophen   Tablet .. 650 milliGRAM(s) Oral every 6 hours PRN Temp greater or equal to 38C (100.4F), Mild Pain (1 - 3), Moderate Pain (4 - 6), Severe Pain (7 - 10)  ALBUTerol    90 MICROgram(s) HFA Inhaler 2 Puff(s) Inhalation every 6 hours PRN Shortness of Breath and/or Wheezing      Allergies    sulfa drugs (Unknown)    Intolerances        T(F): 97.9 (08-12-21 @ 13:07), Max: 98.5 (08-11-21 @ 17:00)  HR: 100 (08-12-21 @ 13:07) (63 - 100)  BP: 149/59 (08-12-21 @ 13:07) (141/64 - 171/76)  RR: 18 (08-12-21 @ 13:07) (17 - 18)  SpO2: 99% (08-12-21 @ 13:07) (93% - 99%)  Wt(kg): --    PHYSICAL EXAM:  GENERAL: NAD  HEAD:  Atraumatic, Normocephalic  EYES: PERRLA, conjunctiva and sclera clear  ENMT: Moist mucous membranes  NECK: Supple, No JVD, Normal thyroid  NERVOUS SYSTEM:  Alert & Awake  CHEST/LUNG: Clear to percussion bilaterally;   HEART: Regular rate and rhythm;   ABDOMEN: Soft, Nontender, Nondistended; Bowel sounds present  EXTREMITIES:  no edema BL LE  SKIN: moist    LABS:                        10.4   5.49  )-----------( 182      ( 12 Aug 2021 07:03 )             32.9     08-12    148<H>  |  117<H>  |  33<H>  ----------------------------<  98  3.7   |  23  |  1.71<H>    Ca    8.2<L>      12 Aug 2021 07:03          Cultures;   CAPILLARY BLOOD GLUCOSE        Lipid panel:           RADIOLOGY & ADDITIONAL TESTS:    Imaging Personally Reviewed:  [x ] YES      Consultant(s) Notes Reviewed:  [x ] YES     Care Discussed with [x ] Consultants [X ] Patient [ ] Family  [x ]    [x ]  Other; RN

## 2021-08-13 NOTE — PROGRESS NOTE ADULT - ASSESSMENT
Patient with fever.   If fever not from COVID, then in theory would be a candidate for monoclonal antibody. However it is possible that this represents a breakthrough infection in a frail elderly individual. Patients requiring inpatient care/hospitalization for COVID did not benefit from MCAB infusion. With CXR showing possible new RLL infiltrate patient could certainly could have fever from COVID. Fortunately individuals vaccinated have a markedly reduced mortality rate compared with unvaccinated individuals. Patient is not a candidate for RDV or Dex at this time.    Patient could have aspiration pneumonia, though relatively low suspicion. She has pyuria and + urine cx, but also epithelial cells on U/A indicating a degree of external contamination. History here is inadequate to discern between and the presence/absence of urinary symptoms.     8/11: Urine isolate S zosyn, at risk for aspiration with new focal R sided infiltrate. +COvID but if RLL infiltrate  from covid rather than aspiration would not be a candidate for MCAB. Not hypoxic to indicate need for RDV/Dex.   8/13: Isolated low grade temperature, otherwise appears stable. Comfortable on RA without any concern for developing pneumonia on the basis of exam (though effort suboptimal).

## 2021-08-13 NOTE — PROGRESS NOTE ADULT - SUBJECTIVE AND OBJECTIVE BOX
[facility]  Division of Infectious Diseases  [phone]    Name: LEANDRO YAÑEZ  Age: 89y  Gender: Female  MRN: [id number]    Interval History--  Notes reviewed    Past Medical History--  CAD (coronary artery disease)    HTN (hypertension)    HLD (hyperlipidemia)    Alzheimer&#x27;s dementia with behavioral disturbance, unspecified timing of dementia onset    Artificial pacemaker        For details regarding the patient's social history, family history, and other miscellaneous elements, please refer the initial infectious diseases consultation and/or the admitting history and physical examination for this admission.    Allergies    sulfa drugs (Unknown)    Intolerances        Medications--  Antibiotics:  vancomycin    Solution 125 milliGRAM(s) Oral <User Schedule>    Immunologic:    Other:  acetaminophen   Tablet .. PRN  ALBUTerol    90 MICROgram(s) HFA Inhaler PRN  amLODIPine   Tablet  aspirin  chewable  atorvastatin  heparin   Injectable  metoprolol succinate ER      Review of Systems--  A 10-point review of systems was obtained.     Pertinent positives and negatives--  Constitutional: No fevers. No Chills. No Rigors.   Cardiovascular: No chest pain. No palpitations.  Respiratory: No shortness of breath. No cough.  Gastrointestinal: No nausea or vomiting. No diarrhea or constipation.   Psychiatric: Pleasant. Appropriate affect.    Review of systems otherwise negative except as previously noted.    Physical Examination--  Vital Signs: T(F): 98.9 (08-13-21 @ 12:55), Max: 100.4 (08-12-21 @ 23:08)  HR: 65 (08-13-21 @ 12:55)  BP: 147/65 (08-13-21 @ 12:55)  RR: 17 (08-13-21 @ 12:55)  SpO2: 93% (08-13-21 @ 12:55)  Wt(kg): --  General: Nontoxic-appearing Female in no acute distress.  HEENT: AT/NC. PERRL. EOMI. Anicteric. Conjunctiva pink and moist. Oropharynx clear. Dentition fair.  Neck: Not rigid. No sense of mass.  Nodes: None palpable.  Lungs: Clear bilaterally without rales, wheezing or rhonchi  Heart: Regular rate and rhythm. No Murmur. No rub. No gallop. No palpable thrill.  Abdomen: Bowel sounds present and normoactive. Soft. Nondistended. Nontender. No sense of mass. No organomegaly.  Back: No spinal tenderness. No costovertebral angle tenderness.   Extremities: No cyanosis or clubbing. No edema.   Skin: Warm. Dry. Good turgor. No rash. No vasculitic stigmata.  Psychiatric: Appropriate affect and mood for situation.         Laboratory Studies--  CBC                        10.4   5.49  )-----------( 182      ( 12 Aug 2021 07:03 )             32.9       Chemistries  08-12    148<H>  |  117<H>  |  33<H>  ----------------------------<  98  3.7   |  23  |  1.71<H>    Ca    8.2<L>      12 Aug 2021 07:03        Culture Data    Culture - Blood (collected 08 Aug 2021 00:45)  Source: .Blood Blood-Peripheral  Final Report (13 Aug 2021 01:01):    No Growth Final    Culture - Blood (collected 08 Aug 2021 00:45)  Source: .Blood Blood-Peripheral  Final Report (13 Aug 2021 01:01):    No Growth Final    Culture - Urine (collected 08 Aug 2021 00:42)  Source: Clean Catch Clean Catch (Midstream)  Final Report (10 Aug 2021 11:56):    >100,000 CFU/ml Escherichia coli  Organism: Escherichia coli (10 Aug 2021 11:56)  Organism: Escherichia coli (10 Aug 2021 11:56)             [facility]  Division of Infectious Diseases  [phone]    Name: LEANDRO YAÑEZ  Age: 89y  Gender: Female  MRN: [id number]    Interval History--  Notes reviewed. Seen earlier today. Oriented to self only, no change. Remains an unreliable historian.     Past Medical History--  CAD (coronary artery disease)    HTN (hypertension)    HLD (hyperlipidemia)    Alzheimer&#x27;s dementia with behavioral disturbance, unspecified timing of dementia onset    Artificial pacemaker        For details regarding the patient's social history, family history, and other miscellaneous elements, please refer the initial infectious diseases consultation and/or the admitting history and physical examination for this admission.    Allergies    sulfa drugs (Unknown)    Intolerances        Medications--  Antibiotics:  vancomycin    Solution 125 milliGRAM(s) Oral <User Schedule>    Immunologic:    Other:  acetaminophen   Tablet .. PRN  ALBUTerol    90 MICROgram(s) HFA Inhaler PRN  amLODIPine   Tablet  aspirin  chewable  atorvastatin  heparin   Injectable  metoprolol succinate ER      Review of Systems--  Review of systems unable secondary to clinical condition.     Physical Examination--  Vital Signs: T(F): 98.9 (08-13-21 @ 12:55), Max: 100.4 (08-12-21 @ 23:08)  HR: 65 (08-13-21 @ 12:55)  BP: 147/65 (08-13-21 @ 12:55)  RR: 17 (08-13-21 @ 12:55)  SpO2: 93% (08-13-21 @ 12:55)  Wt(kg): --  General: Nontoxic-appearing Female in no acute distress.  HEENT: AT/NC. Anicteric. Conjunctiva pink and moist. Oropharynx dry  Neck: Not rigid. No sense of mass.  Nodes: None palpable.  Lungs: Poor effort, grossly clear bilaterally with diminished BS  Heart: Regular rate and rhythm. No Murmur. No rub. No gallop. No palpable thrill.  Abdomen: Bowel sounds present and normoactive. Soft. Nondistended. Nontender. No sense of mass. No organomegaly.  Extremities: No cyanosis or clubbing. No edema.   Skin: Warm. Dry. Good turgor. No rash. No vasculitic stigmata.  Psychiatric: [place] to self, calm.       Laboratory Studies--  CBC                        10.4   5.49  )-----------( 182      ( 12 Aug 2021 07:03 )             32.9       Chemistries  08-12    148<H>  |  117<H>  |  33<H>  ----------------------------<  98  3.7   |  23  |  1.71<H>    Ca    8.2<L>      12 Aug 2021 07:03        Culture Data    Culture - Blood (collected 08 Aug 2021 00:45)  Source: .Blood Blood-Peripheral  Final Report (13 Aug 2021 01:01):    No Growth Final    Culture - Blood (collected 08 Aug 2021 00:45)  Source: .Blood Blood-Peripheral  Final Report (13 Aug 2021 01:01):    No Growth Final    Culture - Urine (collected 08 Aug 2021 00:42)  Source: Clean Catch Clean Catch (Midstream)  Final Report (10 Aug 2021 11:56):    >100,000 CFU/ml Escherichia coli  Organism: Escherichia coli (10 Aug 2021 11:56)  Organism: Escherichia coli (10 Aug 2021 11:56)

## 2021-08-13 NOTE — PROGRESS NOTE ADULT - SUBJECTIVE AND OBJECTIVE BOX
Patient is a 89y old  Female who presents with a chief complaint of Confusion (12 Aug 2021 14:40)      OVERNIGHT EVENTS:    REVIEW OF SYSTEMS: denies chest pain/SOB, diaphoresis, no F/C, cough, dizziness, headache, blurry vision, nausea, vomiting, abdominal pain. All others review of systems negative     MEDICATIONS  (STANDING):  amLODIPine   Tablet 2.5 milliGRAM(s) Oral daily  aspirin  chewable 81 milliGRAM(s) Oral daily  atorvastatin 20 milliGRAM(s) Oral at bedtime  heparin   Injectable 5000 Unit(s) SubCutaneous every 8 hours  metoprolol succinate ER 25 milliGRAM(s) Oral daily  vancomycin    Solution 125 milliGRAM(s) Oral <User Schedule>    MEDICATIONS  (PRN):  acetaminophen   Tablet .. 650 milliGRAM(s) Oral every 6 hours PRN Temp greater or equal to 38C (100.4F), Mild Pain (1 - 3), Moderate Pain (4 - 6), Severe Pain (7 - 10)  ALBUTerol    90 MICROgram(s) HFA Inhaler 2 Puff(s) Inhalation every 6 hours PRN Shortness of Breath and/or Wheezing      Allergies    sulfa drugs (Unknown)    Intolerances        T(F): 98.9 (08-13-21 @ 12:55), Max: 100.4 (08-12-21 @ 23:08)  HR: 65 (08-13-21 @ 12:55) (65 - 100)  BP: 147/65 (08-13-21 @ 12:55) (128/72 - 172/68)  RR: 17 (08-13-21 @ 12:55) (17 - 19)  SpO2: 93% (08-13-21 @ 12:55) (92% - 99%)  Wt(kg): --    PHYSICAL EXAM:  GENERAL: NAD  HEAD:  Atraumatic, Normocephalic  EYES: PERRLA, conjunctiva and sclera clear  ENMT: Moist mucous membranes  NECK: Supple, No JVD, Normal thyroid  NERVOUS SYSTEM:  Alert & Awake  CHEST/LUNG: Clear to percussion bilaterally;   HEART: Regular rate and rhythm;   ABDOMEN: Soft, Nontender, Nondistended; Bowel sounds present  EXTREMITIES:  no edema BL LE  SKIN: moist    LABS:                        10.4   5.49  )-----------( 182      ( 12 Aug 2021 07:03 )             32.9     08-12    148<H>  |  117<H>  |  33<H>  ----------------------------<  98  3.7   |  23  |  1.71<H>    Ca    8.2<L>      12 Aug 2021 07:03          Cultures;   CAPILLARY BLOOD GLUCOSE        Lipid panel:           RADIOLOGY & ADDITIONAL TESTS:    Imaging Personally Reviewed:  [x ] YES      Consultant(s) Notes Reviewed:  [x ] YES     Care Discussed with [x ] Consultants [X ] Patient [ ] Family  [x ]    [x ]  Other; RN

## 2021-08-13 NOTE — PROGRESS NOTE ADULT - PROBLEM SELECTOR PLAN 1
Isolated low grade fever  Monitor temperature curve  I do not think she merits antibiotics at this time  Will be at risk for recurrent aspiration, atelectasis/plugging, etc.   Vigilance for superimposed infectious process  COVID remains in DDx of her fever

## 2021-08-14 NOTE — PROGRESS NOTE ADULT - SUBJECTIVE AND OBJECTIVE BOX
Patient is a 89y old  Female who presents with a chief complaint of Confusion (14 Aug 2021 12:47)      OVERNIGHT EVENTS:    REVIEW OF SYSTEMS: denies chest pain/SOB, diaphoresis, no F/C, cough, dizziness, headache, blurry vision, nausea, vomiting, abdominal pain. All others review of systems negative     MEDICATIONS  (STANDING):  amLODIPine   Tablet 2.5 milliGRAM(s) Oral daily  aspirin  chewable 81 milliGRAM(s) Oral daily  atorvastatin 20 milliGRAM(s) Oral at bedtime  heparin   Injectable 5000 Unit(s) SubCutaneous every 8 hours  metoprolol succinate ER 25 milliGRAM(s) Oral daily  potassium chloride   Powder 40 milliEquivalent(s) Oral two times a day  vancomycin    Solution 125 milliGRAM(s) Oral <User Schedule>    MEDICATIONS  (PRN):  acetaminophen   Tablet .. 650 milliGRAM(s) Oral every 6 hours PRN Temp greater or equal to 38C (100.4F), Mild Pain (1 - 3), Moderate Pain (4 - 6), Severe Pain (7 - 10)  ALBUTerol    90 MICROgram(s) HFA Inhaler 2 Puff(s) Inhalation every 6 hours PRN Shortness of Breath and/or Wheezing      Allergies    sulfa drugs (Unknown)    Intolerances        T(F): 97 (08-14-21 @ 12:15), Max: 99.3 (08-13-21 @ 23:52)  HR: 68 (08-14-21 @ 12:15) (66 - 79)  BP: 172/78 (08-14-21 @ 12:15) (165/75 - 178/85)  RR: 17 (08-14-21 @ 12:15) (17 - 18)  SpO2: 93% (08-14-21 @ 12:15) (89% - 95%)  Wt(kg): --    PHYSICAL EXAM:  GENERAL: NAD  HEAD:  Atraumatic, Normocephalic  EYES: PERRLA, conjunctiva and sclera clear  ENMT: Moist mucous membranes  NECK: Supple, No JVD, Normal thyroid  NERVOUS SYSTEM:  Alert & Awake  CHEST/LUNG: Clear to percussion bilaterally;   HEART: Regular rate and rhythm;   ABDOMEN: Soft, Nontender, Nondistended; Bowel sounds present  EXTREMITIES:  no edema BL LE  SKIN: moist    LABS:    08-14    155<H>  |  123<H>  |  29<H>  ----------------------------<  116<H>  3.0<L>   |  25  |  1.39<H>    Ca    8.1<L>      14 Aug 2021 07:29          Cultures;   CAPILLARY BLOOD GLUCOSE        Lipid panel:           RADIOLOGY & ADDITIONAL TESTS:    Imaging Personally Reviewed:  [x ] YES      Consultant(s) Notes Reviewed:  [x ] YES     Care Discussed with [x ] Consultants [X ] Patient [ ] Family  [x ]    [x ]  Other; RN

## 2021-08-14 NOTE — PROGRESS NOTE ADULT - SUBJECTIVE AND OBJECTIVE BOX
AZUL YAÑEZL  89y  Female    Patient is a 89y old  Female who presents with a chief complaint of Confusion (13 Aug 2021 13:43)      awake and alert    PAST MEDICAL & SURGICAL HISTORY:  CAD (coronary artery disease)  s/p CABG, PPM    HTN (hypertension)    HLD (hyperlipidemia)    Alzheimer&#x27;s dementia with behavioral disturbance, unspecified timing of dementia onset    Artificial pacemaker            PHYSICAL EXAM:    T(C): 36.1 (08-14-21 @ 12:15), Max: 37.4 (08-13-21 @ 23:52)  HR: 68 (08-14-21 @ 12:15) (65 - 79)  BP: 172/78 (08-14-21 @ 12:15) (147/65 - 178/85)  RR: 17 (08-14-21 @ 12:15) (17 - 18)  SpO2: 93% (08-14-21 @ 12:15) (89% - 95%)  Wt(kg): --    I&O's Detail    14 Aug 2021 07:01  -  14 Aug 2021 12:47  --------------------------------------------------------  IN:    Oral Fluid: 360 mL  Total IN: 360 mL    OUT:  Total OUT: 0 mL    Total NET: 360 mL          Respiratory: clear anteriorly, decreased BS at bases  Cardiovascular: S1 S2  Gastrointestinal: soft NT ND +BS  Extremities: edema   Neuro: Awake and alert    MEDICATIONS  (STANDING):  amLODIPine   Tablet 2.5 milliGRAM(s) Oral daily  aspirin  chewable 81 milliGRAM(s) Oral daily  atorvastatin 20 milliGRAM(s) Oral at bedtime  heparin   Injectable 5000 Unit(s) SubCutaneous every 8 hours  metoprolol succinate ER 25 milliGRAM(s) Oral daily  potassium chloride   Powder 40 milliEquivalent(s) Oral two times a day  vancomycin    Solution 125 milliGRAM(s) Oral <User Schedule>    MEDICATIONS  (PRN):  acetaminophen   Tablet .. 650 milliGRAM(s) Oral every 6 hours PRN Temp greater or equal to 38C (100.4F), Mild Pain (1 - 3), Moderate Pain (4 - 6), Severe Pain (7 - 10)  ALBUTerol    90 MICROgram(s) HFA Inhaler 2 Puff(s) Inhalation every 6 hours PRN Shortness of Breath and/or Wheezing          08-14    155<H>  |  123<H>  |  29<H>  ----------------------------<  116<H>  3.0<L>   |  25  |  1.39<H>    Ca    8.1<L>      14 Aug 2021 07:29        Creatinine Trend: Creatinine Trend: 1.39<--, 1.71<--, 1.69<--, 1.83<--, 1.55<--, 1.56<--

## 2021-08-14 NOTE — PROGRESS NOTE ADULT - ASSESSMENT
89F with PMHx of CAD, HTN, and Parkinson's Disease presenting from [facility] for increasing confusion.   COVID PNA and decreased PO intake.   Renal indices are improving. Supplement potassium.  Needs free water intake.

## 2021-08-14 NOTE — PROGRESS NOTE ADULT - ASSESSMENT
89F with PMHx of CAD, HTN, and Parkinson's Disease presenting from [facility] for increasing confusion. There is limited collateral from daughter and [facility] unable to be reached overnight. On labs patient with pyuria and DONITA and found to be incidentally COVID-19 positive. Patient being admitted for likely infectious encephalopathy.    #acute metabolic Encephalopathy  acute UTI and Gram-neg pneumonia with Zosyn  -Follow up blood and urine cultures  -Possibly COVID-19 is contributing to presentation, plan below   improving mentation.    d/w daughter has some memory /cognitive issues    #COVID-19 pneumonia- incidentally positive. Patient with weak antibodies in April 2021. She was fully vaccinated on February 2021 with Pfizer. Wonder if this could be breakthrough infection?  -Currently not hypoxic, so will defer Remdesivir & steroids  -Monitor O2 saturation  -Send inflammatory markers: ferritin, D-Dimer, CRP  -DVT PPx with unfractionated heparin  8/9/2021 fever but not hypoxic. supportive care . 8/10/2021 f/u official CXR read. appears developing a  right side infiltrate  She does not merit treatment with RDV or Dex at this time per ID    #CKD stage 3 and DONITA  -Possibly pre-renal in etiology, follow up Cr in AM after 30 cc/kg bolus given by ED  -Renal US medical renal disease.   continue with gentle  ivf   8/9/2021 will get renal consult    8/10/2021 improving creatinine/ f/u renal consult    Presumed past C. Diff  - per my colleague's note he reviewed  [facility] medication list patient is on Vancomycin 125 mg daily M/W/F until 8/20 which probably means they are pulsing her --> [facility] Pharmacist to follow up  no diarrhea reported by nursing staff    #CAD  -Continue with baby aspirin and Lipitor as tolerated  -Continue with beta blocker as tolerated (on Toprol XL 25 mg daily)    #Parkinson's Disease   -Per prior notes patient is not on any medications due to poor response    PT consult    had barium swallow June 24th 2021 as outpt results noted diet ordered as recommended then    d/w daughter via phone 89F with PMHx of CAD, HTN, and Parkinson's Disease presenting from [facility] for increasing confusion. There is limited collateral from daughter and [facility] unable to be reached overnight. On labs patient with pyuria and DONITA and found to be incidentally COVID-19 positive. Patient being admitted for likely infectious encephalopathy.    #acute metabolic Encephalopathy  acute UTI and Gram-neg pneumonia with Zosyn  -Follow up blood and urine cultures  -Possibly COVID-19 is contributing to presentation, plan below   improving mentation.    d/w daughter has some memory /cognitive issues  COVID-19 pneumonia- incidentally positive. Patient with weak antibodies in April 2021. She was fully vaccinated on February 2021 with Pfizer. Wonder if this could be breakthrough infection?  -Currently not hypoxic, so will defer Remdesivir & steroids  -Monitor O2 saturation  -Send inflammatory markers: ferritin, D-Dimer, CRP  -DVT PPx with unfractionated heparin  8/9/2021 fever but not hypoxic. supportive care . 8/10/2021 f/u official CXR read. appears developing a  right side infiltrate  She does not merit treatment with RDV or Dex at this time per ID  Hypokalemia- lyte replaced, monitor  CKD stage 3 and DONITA  -Possibly pre-renal in etiology, follow up Cr in AM after 30 cc/kg bolus given by ED  -Renal US medical renal disease.   continue with gentle  ivf   8/9/2021 will get renal consult    8/10/2021 improving creatinine/ f/u renal consult  Presumed past C. Diff  - per my colleague's note he reviewed  [facility] medication list patient is on Vancomycin 125 mg daily M/W/F until 8/20 which probably means they are pulsing her --> [facility] Pharmacist to follow up  no diarrhea reported by nursing staff  CAD  -Continue with baby aspirin and Lipitor as tolerated  -Continue with beta blocker as tolerated (on Toprol XL 25 mg daily)  Parkinson's Disease   -Per prior notes patient is not on any medications due to poor response    PT consult  - [facility] when COVID neg  had barium swallow June 24th 2021 as outpt results noted diet ordered as recommended then    d/w daughter via phone

## 2021-08-15 NOTE — DIETITIAN INITIAL EVALUATION ADULT. - DIET TYPE
Vital Cuisine x 2/day (provides 1040 kcal, 44 g protein)/dysphagia 1, pureed, nectar consistency fluid/supplement (specify)

## 2021-08-15 NOTE — PROGRESS NOTE ADULT - ASSESSMENT
89F with PMHx of CAD, HTN, and Parkinson's Disease presenting from [facility] for increasing confusion. There is limited collateral from daughter and [facility] unable to be reached overnight. On labs patient with pyuria and DONITA and found to be incidentally COVID-19 positive. Patient being admitted for likely infectious encephalopathy.    acute metabolic Encephalopathy  Acute hypernatremia- stat IV hydration pt has poor appetite, rpt bmp today  Dysphagia- had barium swallow June 24th 2021 as outpt results noted diet ordered as recommended then. c/w dysphagia diet, aspiration precaution maintained  acute UTI and Gram-neg pneumonia s/p Zosyn  -blood and urine cultures; No Growth Final   -Possibly COVID-19 is contributing to presentation, plan below  -d/w daughter via phone  COVID-19 pneumonia- incidentally positive. Patient with weak antibodies in April 2021. She was fully vaccinated on February 2021 with Pfizer. Wonder if this could be breakthrough infection?  -Currently not hypoxic, so will defer Remdesivir & steroids  -Monitor O2 saturation  -DVT PPx with unfractionated heparin  8/9/2021 fever but not hypoxic. supportive care . 8/10/2021 f/u official CXR read. appears developing a  right side infiltrate  She does not merit treatment with RDV or Dex at this time per ID  Hypokalemia- lyte replaced, monitor  CKD stage 3 and DONITA  -Possibly pre-renal in etiology, 30 cc/kg bolus given by ED  -Renal US medical renal disease.   -continue with gentle  ivf   -renal o/b  8/10/2021 improving creatinine/ f/u renal consult  Presumed past C. Diff  - per my colleague's note he reviewed  [facility] medication list patient is on Vancomycin 125 mg daily M/W/F until 8/20 which probably means they are pulsing her --> [facility] Pharmacist to follow up  -no diarrhea reported by nursing staff  CAD  -Continue with baby aspirin and Lipitor as tolerated  -Continue with beta blocker as tolerated (on Toprol XL 25 mg daily)  Parkinson's Disease   -Per prior notes patient is not on any medications due to poor response    PT consult  - residential when COVID neg  d/w daughter via phone

## 2021-08-15 NOTE — PROGRESS NOTE ADULT - SUBJECTIVE AND OBJECTIVE BOX
Patient is a 89y old  Female who presents with a chief complaint of Confusion (15 Aug 2021 09:51)      OVERNIGHT EVENTS:     REVIEW OF SYSTEMS: poor historian     MEDICATIONS  (STANDING):  amLODIPine   Tablet 2.5 milliGRAM(s) Oral daily  aspirin  chewable 81 milliGRAM(s) Oral daily  atorvastatin 20 milliGRAM(s) Oral at bedtime  dextrose 10% + sodium chloride 0.45%. 1000 milliLiter(s) (999 mL/Hr) IV Continuous <Continuous>  dextrose 10% + sodium chloride 0.45%. 1000 milliLiter(s) (80 mL/Hr) IV Continuous <Continuous>  heparin   Injectable 5000 Unit(s) SubCutaneous every 8 hours  lactated ringers Bolus 1000 milliLiter(s) IV Bolus once  metoprolol succinate ER 25 milliGRAM(s) Oral daily  vancomycin    Solution 125 milliGRAM(s) Oral <User Schedule>    MEDICATIONS  (PRN):  acetaminophen   Tablet .. 650 milliGRAM(s) Oral every 6 hours PRN Temp greater or equal to 38C (100.4F), Mild Pain (1 - 3), Moderate Pain (4 - 6), Severe Pain (7 - 10)  ALBUTerol    90 MICROgram(s) HFA Inhaler 2 Puff(s) Inhalation every 6 hours PRN Shortness of Breath and/or Wheezing  guaiFENesin Oral Liquid (Sugar-Free) 200 milliGRAM(s) Oral every 6 hours PRN Cough      Allergies    sulfa drugs (Unknown)    Intolerances        T(F): 97.9 (08-15-21 @ 05:06), Max: 98.4 (08-14-21 @ 23:55)  HR: 71 (08-15-21 @ 05:06) (60 - 75)  BP: 165/60 (08-15-21 @ 05:06) (164/74 - 172/78)  RR: 18 (08-15-21 @ 05:06) (17 - 18)  SpO2: 91% (08-15-21 @ 05:06) (91% - 94%)  Wt(kg): --    PHYSICAL EXAM:  GENERAL: NAD  HEAD:  Atraumatic, Normocephalic  EYES: PERRLA, conjunctiva and sclera clear  ENMT: Moist mucous membranes  NECK: Supple, No JVD, Normal thyroid  NERVOUS SYSTEM:  Alert & Awake  CHEST/LUNG: Clear to percussion bilaterally;   HEART: Regular rate and rhythm;   ABDOMEN: Soft, Nontender, Nondistended; Bowel sounds present  EXTREMITIES:  no edema BL LE  SKIN: moist    LABS:    08-15    158<H>  |  126<H>  |  34<H>  ----------------------------<  107<H>  3.5   |  27  |  1.44<H>    Ca    8.4<L>      15 Aug 2021 07:42          Cultures;   CAPILLARY BLOOD GLUCOSE        Lipid panel:           RADIOLOGY & ADDITIONAL TESTS:    Imaging Personally Reviewed:  [x ] YES      Consultant(s) Notes Reviewed:  [x ] YES     Care Discussed with [x ] Consultants [X ] Patient [ ] Family  [x ]    [x ]  Other; RN

## 2021-08-15 NOTE — DIETITIAN INITIAL EVALUATION ADULT. - PERTINENT MEDS FT
MEDICATIONS  (STANDING):  amLODIPine   Tablet 2.5 milliGRAM(s) Oral daily  aspirin  chewable 81 milliGRAM(s) Oral daily  atorvastatin 20 milliGRAM(s) Oral at bedtime  heparin   Injectable 5000 Unit(s) SubCutaneous every 8 hours  metoprolol succinate ER 25 milliGRAM(s) Oral daily  vancomycin    Solution 125 milliGRAM(s) Oral <User Schedule>    MEDICATIONS  (PRN):  acetaminophen   Tablet .. 650 milliGRAM(s) Oral every 6 hours PRN Temp greater or equal to 38C (100.4F), Mild Pain (1 - 3), Moderate Pain (4 - 6), Severe Pain (7 - 10)  ALBUTerol    90 MICROgram(s) HFA Inhaler 2 Puff(s) Inhalation every 6 hours PRN Shortness of Breath and/or Wheezing

## 2021-08-15 NOTE — DIETITIAN INITIAL EVALUATION ADULT. - PERTINENT LABORATORY DATA
08-15 Na158 mmol/L<H> Glu 107 mg/dL<H> K+ 3.5 mmol/L Cr  1.44 mg/dL<H> BUN 34 mg/dL<H> 08-10 Phos 3.1 mg/dL

## 2021-08-15 NOTE — DIETITIAN INITIAL EVALUATION ADULT. - OTHER INFO
Pt w/ acute metabolic encephalopathy ; acute UTI and gram negative pneumonia ; covid-19 ; CKD stage 3 and DONITA ; CAD ; parkinsons disease ; Alzheimers dementia; pt is lethargic and confused. Unable to conduct NFPE at this time. 8/10 s/p swallow eval w/ current recommendations.  Pt has had no weight loss since admission.

## 2021-08-16 NOTE — PROGRESS NOTE ADULT - SUBJECTIVE AND OBJECTIVE BOX
[facility] NEPHROLOGY SERVICES, [facility]  NEPHROLOGY AND HYPERTENSION  [address]  [address]  [place] [address]  [phone]    MD IRMA CASTANEDA, MD RM PERALTA MD YELENA ROSENBERG, MD CLOVIS MAIN, MD ARMANDO DOAN MD          Patient events noted      MEDICATIONS  (STANDING):  aspirin  chewable 81 milliGRAM(s) Oral daily  atorvastatin 20 milliGRAM(s) Oral at bedtime  dexAMETHasone  Injectable 6 milliGRAM(s) IV Push daily  dextrose 5%. 1000 milliLiter(s) (80 mL/Hr) IV Continuous <Continuous>  heparin   Injectable 5000 Unit(s) SubCutaneous every 8 hours  metolazone 2.5 milliGRAM(s) Oral daily  metoprolol succinate ER 25 milliGRAM(s) Oral daily  potassium chloride  20 mEq/100 mL IVPB 20 milliEquivalent(s) IV Intermittent every 2 hours  remdesivir  IVPB   IV Intermittent   vancomycin    Solution 125 milliGRAM(s) Oral <User Schedule>    MEDICATIONS  (PRN):  acetaminophen   Tablet .. 650 milliGRAM(s) Oral every 6 hours PRN Temp greater or equal to 38C (100.4F), Mild Pain (1 - 3), Moderate Pain (4 - 6), Severe Pain (7 - 10)  ALBUTerol    90 MICROgram(s) HFA Inhaler 2 Puff(s) Inhalation every 6 hours PRN Shortness of Breath and/or Wheezing  guaiFENesin Oral Liquid (Sugar-Free) 200 milliGRAM(s) Oral every 6 hours PRN Cough      08-15-21 @ 07:01  -  08-16-21 @ 07:00  --------------------------------------------------------  IN: 0 mL / OUT: 400 mL / NET: -400 mL      PHYSICAL EXAM:      T(C): 37 (08-16-21 @ 17:52), Max: 38.4 (08-16-21 @ 05:59)  HR: 73 (08-16-21 @ 17:52) (64 - 75)  BP: 181/77 (08-16-21 @ 17:52) (131/64 - 181/77)  RR: 17 (08-16-21 @ 17:52) (17 - 19)  SpO2: 93% (08-16-21 @ 17:52) (91% - 95%)  Wt(kg): --  Lungs clear  Heart S1S2  Abd soft NT ND  Extremities:   tr edema                                    8.7    8.71  )-----------( 205      ( 16 Aug 2021 15:47 )             27.1     08-16    149<H>  |  118<H>  |  32<H>  ----------------------------<  103<H>  3.2<L>   |  26  |  1.38<H>    Ca    8.0<L>      16 Aug 2021 15:47    TPro  5.8<L>  /  Alb  1.7<L>  /  TBili  0.3  /  DBili  .10  /  AST  54<H>  /  ALT  27  /  AlkPhos  77  08-16    ABG - ( 16 Aug 2021 12:16 )  pH, Arterial: x     pH, Blood: 7.47  /  pCO2: 29    /  pO2: 93    / HCO3: 20    / Base Excess: -2.1  /  SaO2: 97                LIVER FUNCTIONS - ( 16 Aug 2021 15:47 )  Alb: 1.7 g/dL / Pro: 5.8 gm/dL / ALK PHOS: 77 U/L / ALT: 27 U/L / AST: 54 U/L / GGT: x           Creatinine Trend: 1.38<--, 1.39<--, 1.46<--, 1.44<--, 1.39<--, 1.71<--      Assessment   DONITA CKD pre renal azotemia  Hypernatremia       Plan:  Hypotonic IVF with distal diuretic   Will follow course     Dany Huynh MD

## 2021-08-16 NOTE — PROGRESS NOTE ADULT - SUBJECTIVE AND OBJECTIVE BOX
LEANDRO YAÑEZ  [id number]    Follow Up:  PNA, UTI, COVID 19    Interval History: The pt was seen and examined earlier, not interactive, lethargic, does not follow any commands.  The pt had a temp of 101.2 this morning, no WBC.     PAST MEDICAL & SURGICAL HISTORY:  CAD (coronary artery disease)  s/p CABG, PPM    HTN (hypertension)    HLD (hyperlipidemia)    Alzheimer&#x27;s dementia with behavioral disturbance, unspecified timing of dementia onset    Artificial pacemaker        ROS:    [x ] Unobtainable because: Alzheimer's dementia, not interactive.   [ ] All other systems negative    Constitutional: no fever, no chills  Head: no trauma  Eyes: no vision changes, no eye pain  ENT:  no sore throat, no rhinorrhea  Cardiovascular:  no chest pain, no palpitation  Respiratory:  no SOB, no cough  GI:  no abd pain, no vomiting, no diarrhea  urinary: no dysuria, no hematuria, no flank pain  musculoskeletal:  no joint pain, no joint swelling  skin:  no rash  neurology:  no headache, no seizure, no change in mental status  psych: no anxiety, no depression         Allergies  sulfa drugs (Unknown)        ANTIMICROBIALS:  remdesivir  IVPB    vancomycin    Solution 125 <User Schedule>      OTHER MEDS:  acetaminophen   Tablet .. 650 milliGRAM(s) Oral every 6 hours PRN  ALBUTerol    90 MICROgram(s) HFA Inhaler 2 Puff(s) Inhalation every 6 hours PRN  amLODIPine   Tablet 2.5 milliGRAM(s) Oral daily  aspirin  chewable 81 milliGRAM(s) Oral daily  atorvastatin 20 milliGRAM(s) Oral at bedtime  dexAMETHasone  Injectable 6 milliGRAM(s) IV Push daily  dextrose 5%. 1000 milliLiter(s) IV Continuous <Continuous>  guaiFENesin Oral Liquid (Sugar-Free) 200 milliGRAM(s) Oral every 6 hours PRN  heparin   Injectable 5000 Unit(s) SubCutaneous every 8 hours  metolazone 2.5 milliGRAM(s) Oral daily  metoprolol succinate ER 25 milliGRAM(s) Oral daily      Vital Signs Last 24 Hrs  T(C): 38.3 (16 Aug 2021 13:30), Max: 38.4 (16 Aug 2021 05:59)  T(F): 100.9 (16 Aug 2021 13:30), Max: 101.2 (16 Aug 2021 05:59)  HR: 64 (16 Aug 2021 13:30) (63 - 75)  BP: 131/64 (16 Aug 2021 13:30) (131/64 - 180/68)  BP(mean): --  RR: 18 (16 Aug 2021 13:30) (17 - 20)  SpO2: 93% (16 Aug 2021 13:30) (91% - 95%)    Physical Exam:  General: no acute distress, on NC  HEENT: AT/NC. unable to assess pt's eyes and mouth due to non-compliance.   Neck: Not rigid. No sense of mass.  Nodes: None palpable.  Lungs: Poor effort, diminished breath sounds bilaterally   Heart: Regular rate and rhythm. No Murmur. No rub. No gallop. No palpable thrill.  Abdomen: Bowel sounds present and normoactive. Soft. Nondistended. Nontender. No sense of mass. No organomegaly.  Extremities: No cyanosis or clubbing. No edema.   Skin: Warm. Dry. Good turgor. No rash. No vasculitic stigmata.  Psychiatric: unable to assess    WBC Count: 8.71 K/uL (08-16 @ 15:47)  WBC Count: 6.94 K/uL (08-16 @ 07:24)  WBC Count: 5.49 K/uL (08-12 @ 07:03)                            8.7    8.71  )-----------( 205      ( 16 Aug 2021 15:47 )             27.1       08-16    149<H>  |  118<H>  |  32<H>  ----------------------------<  103<H>  3.2<L>   |  26  |  1.38<H>    Ca    8.0<L>      16 Aug 2021 15:47    TPro  5.8<L>  /  Alb  1.7<L>  /  TBili  0.3  /  DBili  .10  /  AST  54<H>  /  ALT  27  /  AlkPhos  77  08-16          Creatinine Trend: 1.38<--, 1.39<--, 1.46<--, 1.44<--, 1.39<--, 1.71<--  Lactate, Blood: 1.6 mmol/L (08-16-21 @ 15:47)      MICROBIOLOGY:  v  .Blood Blood-Peripheral  08-08-21   No Growth Final  --  --      Clean Catch Clean Catch (Midstream)  08-08-21   >100,000 CFU/ml Escherichia coli  --  Escherichia coli      C-Reactive Protein, Serum: 7 (08-08)    Ferritin, Serum: 65 (08-08)      D-Dimer Assay, Quantitative: 381 (08-08)      SARS-CoV-2: Kirsty (27 Apr 2021 14:22)    RADIOLOGY:

## 2021-08-16 NOTE — PROGRESS NOTE ADULT - PROBLEM SELECTOR PLAN 1
Monitor temperature curve  Monitor WBC  give one time dose of IV Vancomycin  give one time dose of Amikacin  start remdesivir x 5 days  start decadron x 10 days  collect urine and blood cultures x 2  CXR - performed, pending reading  please address GOC R/O sepsis vs. fever from COVID pneumonia  Monitor temperature curve  Monitor WBC  give one time dose of IV Vancomycin  give one time dose of Amikacin  start remdesivir x 5 days  start decadron x 10 days  collect urine and blood cultures x 2  CXR - performed, pending reading  please address GOC

## 2021-08-16 NOTE — PROGRESS NOTE ADULT - ASSESSMENT
Patient with fever.   If fever not from COVID, then in theory would be a candidate for monoclonal antibody. However it is possible that this represents a breakthrough infection in a frail elderly individual. Patients requiring inpatient care/hospitalization for COVID did not benefit from MCAB infusion. With CXR showing possible new RLL infiltrate patient could certainly could have fever from COVID. Fortunately individuals vaccinated have a markedly reduced mortality rate compared with unvaccinated individuals. Patient is not a candidate for RDV or Dex at this time.    Patient could have aspiration pneumonia, though relatively low suspicion. She has pyuria and + urine cx, but also epithelial cells on U/A indicating a degree of external contamination. History here is inadequate to discern between and the presence/absence of urinary symptoms.     8/11: Urine isolate S zosyn, at risk for aspiration with new focal R sided infiltrate. +COvID but if RLL infiltrate  from covid rather than aspiration would not be a candidate for MCAB. Not hypoxic to indicate need for RDV/Dex.   8/13: Isolated low grade temperature, otherwise appears stable. Comfortable on RA without any concern for developing pneumonia on the basis of exam (though effort suboptimal).   8/16: spiking fevers, on NC, no leukocytosis, Cr decreased 1.39, pt is not interactive, blood cultures with no growth, UC with E. Coli, tested positive for COVID 19 on 8/7, unknown for how long the pt had symptoms. The pt was given a dose of IV Vancomycin and a dose of Amikacin for possible acute infection, UTI or aspiration are in differential. Sepsis protocol was activated, Urine and blood cultures collected, CXR performed. The pt was started on remdesivir and Decadron for COVID 19. Vancomycin po taper continued.

## 2021-08-16 NOTE — PROGRESS NOTE ADULT - ASSESSMENT
89F with PMHx of CAD, HTN, and Parkinson's Disease presenting from [facility] for increasing confusion.     1. COVID 19 Pneumonia  started on Remdesivir, decadron  Supplemental O2 as required  Isolation precautions  ID on board.    2. Fever   r/o aspiration pneumonia  s/p vanco x1  amikacin x1  f/u Ucx, Blood cx  started on remdesivir and decadron.  f/u CXR    3. HTN  increased amlodipine.    4. HLD  c/w atorvastatin    5. Hypernatremia  c/w D5W   f/u bmp    6. hypokalemia  replete and recheck.    7. DONITA  improving  f/u bmp    8. DVT ppx  on heparin SQ.         89F with PMHx of CAD, HTN, and Parkinson's Disease presenting from [facility] for increasing confusion.     1. COVID 19 Pneumonia  pt is hypoxic 93%, on 6l NC.  started on Remdesivir, decadron  Supplemental O2 as required  Isolation precautions  ID on board.    2. Fever   r/o aspiration pneumonia  s/p vanco x1  amikacin x1  f/u Ucx, Blood cx  started on remdesivir and decadron.  f/u CXR    3. HTN  increased amlodipine.    4. HLD  c/w atorvastatin    5. Hypernatremia  c/w D5W   f/u bmp    6. hypokalemia  replete and recheck.    7. DONITA  improving  f/u bmp    8. DVT ppx  on heparin SQ.

## 2021-08-16 NOTE — PROGRESS NOTE ADULT - SUBJECTIVE AND OBJECTIVE BOX
INTERVAL HPI/OVERNIGHT EVENTS: Pt seen and examined at bedside. Pt is lethargic.    89y  Vital Signs Last 24 Hrs  T(C): 37 (16 Aug 2021 17:52), Max: 38.4 (16 Aug 2021 05:59)  T(F): 98.6 (16 Aug 2021 17:52), Max: 101.2 (16 Aug 2021 05:59)  HR: 73 (16 Aug 2021 17:52) (63 - 75)  BP: 181/77 (16 Aug 2021 17:52) (131/64 - 181/77)  BP(mean): --  RR: 17 (16 Aug 2021 17:52) (17 - 20)  SpO2: 93% (16 Aug 2021 17:52) (91% - 95%)  I&O's Summary    15 Aug 2021 07:01  -  16 Aug 2021 07:00  --------------------------------------------------------  IN: 0 mL / OUT: 400 mL / NET: -400 mL      MEDICATIONS  (STANDING):  amLODIPine   Tablet 2.5 milliGRAM(s) Oral daily  aspirin  chewable 81 milliGRAM(s) Oral daily  atorvastatin 20 milliGRAM(s) Oral at bedtime  dexAMETHasone  Injectable 6 milliGRAM(s) IV Push daily  dextrose 5%. 1000 milliLiter(s) (80 mL/Hr) IV Continuous <Continuous>  heparin   Injectable 5000 Unit(s) SubCutaneous every 8 hours  metolazone 2.5 milliGRAM(s) Oral daily  metoprolol succinate ER 25 milliGRAM(s) Oral daily  remdesivir  IVPB   IV Intermittent   vancomycin    Solution 125 milliGRAM(s) Oral <User Schedule>    MEDICATIONS  (PRN):  acetaminophen   Tablet .. 650 milliGRAM(s) Oral every 6 hours PRN Temp greater or equal to 38C (100.4F), Mild Pain (1 - 3), Moderate Pain (4 - 6), Severe Pain (7 - 10)  ALBUTerol    90 MICROgram(s) HFA Inhaler 2 Puff(s) Inhalation every 6 hours PRN Shortness of Breath and/or Wheezing  guaiFENesin Oral Liquid (Sugar-Free) 200 milliGRAM(s) Oral every 6 hours PRN Cough    LABS:    trop                        8.7    8.71  )-----------( 205      ( 16 Aug 2021 15:47 )             27.1     08-16    149<H>  |  118<H>  |  32<H>  ----------------------------<  103<H>  3.2<L>   |  26  |  1.38<H>    Ca    8.0<L>      16 Aug 2021 15:47    TPro  5.8<L>  /  Alb  1.7<L>  /  TBili  0.3  /  DBili  .10  /  AST  54<H>  /  ALT  27  /  AlkPhos  77  08-16    PT/INR - ( 16 Aug 2021 15:47 )   PT: 13.5 sec;   INR: 1.17 ratio         PTT - ( 16 Aug 2021 15:47 )  PTT:50.8 sec    CAPILLARY BLOOD GLUCOSE          ABG - ( 16 Aug 2021 12:16 )  pH, Arterial: x     pH, Blood: 7.47  /  pCO2: 29    /  pO2: 93    / HCO3: 20    / Base Excess: -2.1  /  SaO2: 97            RADIOLOGY & ADDITIONAL TESTS:    Imaging Personally Reviewed:  [ ] YES  [ ] NO    Consultant(s) Notes Reviewed:  [x ] YES  [ ] NO    PHYSICAL EXAM:  GENERAL: NAD, on NC  CHEST/LUNG: decreased BS b/l  HEART: Regular rate and rhythm  ABDOMEN: Soft, Nontender, Nondistended; Bowel sounds present  EXTREMITIES:  No edema      A & P:        Care Discussed with Consultants/Other Providers [ x] YES  [ ] NO

## 2021-08-16 NOTE — PROGRESS NOTE ADULT - PROBLEM SELECTOR PLAN 5
S/P Rx x5 days  Aspiration precautions, as able  one time dose of Vancomycin given  one time dose of Amikacin given  CXR performed and pending reading S/P prior rx x5 days w zosyn  Aspiration precautions, as able  one time dose of Vancomycin given  one time dose of Amikacin given  CXR performed and pending reading (see below)

## 2021-08-17 NOTE — PROGRESS NOTE ADULT - PROBLEM SELECTOR PLAN 5
S/P prior rx x5 days w zosyn  Aspiration precautions, as able  one time dose of Vancomycin given yesterday  one time dose of Amikacin given yesterday   CXR - worsening, f/up final reading

## 2021-08-17 NOTE — PROGRESS NOTE ADULT - PROBLEM SELECTOR PLAN 1
R/O sepsis vs. fever from COVID pneumonia  Monitor temperature curve  Monitor WBC  s/p one time dose of IV Vancomycin and Amikacin yesterday  continue remdesivir x 5 days  continue decadron x 10 days  blood cultures are pending   CXR - performed, not read, + worsening infiltrates, f/up final reading

## 2021-08-17 NOTE — PROGRESS NOTE ADULT - SUBJECTIVE AND OBJECTIVE BOX
NEPHROLOGY PROGRESS NOTE    CHIEF COMPLAINT:  DONITA    HPI:  Renal function improving.  Serum Na still a little elevated.  Saturating well with NRB.      EXAM:  T(F): 98.6 (21 @ 10:50)  HR: 71 (21 @ 10:50)  BP: 152/68 (21 @ 10:50)  RR: 20 (21 @ 10:50)  SpO2: 100% (21 @ 10:50)           LABS                             9.6    5.46  )-----------( 196      ( 17 Aug 2021 06:23 )             30.0              148<H>  |  120<H>  |  30<H>  ----------------------------<  116<H>  4.4   |  24  |  1.08    Ca    8.0<L>      17 Aug 2021 06:23    TPro  5.7<L>  /  Alb  1.4<L>  /  TBili  0.5  /  DBili  .09  /  AST  51<H>  /  ALT  29  /  AlkPhos  85      Urinalysis Basic - ( 17 Aug 2021 02:58 )  Color: Yellow / Appearance: Slightly Turbid / S.010 / pH: x  Gluc: x / Ketone: Negative  / Bili: Negative / Urobili: Negative mg/dL   Blood: x / Protein: 30 mg/dL / Nitrite: Negative   Leuk Esterase: Trace / RBC: 6-10 /HPF / WBC 6-10   Sq Epi: x / Non Sq Epi: Many / Bacteria: Moderate      Assessment   DONITA CKD pre renal azotemia - improved  Hypernatremia       Plan:  Continue D5W  BMP daily

## 2021-08-17 NOTE — PROGRESS NOTE ADULT - SUBJECTIVE AND OBJECTIVE BOX
LEANDRO YAÑEZ  [id number]    Follow Up:  COVID 19, fever    Interval History: The pt was seen earlier, on NRB now, pt is more interactive today, opens her eyes and seems nodding when asked if she is feeling better. The pt had no fevers today, no leukocytosis.      PAST MEDICAL & SURGICAL HISTORY:  CAD (coronary artery disease)  s/p CABG, PPM    HTN (hypertension)    HLD (hyperlipidemia)    Alzheimer&#x27;s dementia with behavioral disturbance, unspecified timing of dementia onset    Artificial pacemaker        ROS:    [ x] Unobtainable because: pt nodded a couple times, unable to answer questions, lethargic  [ ] All other systems negative    Constitutional: no fever, no chills  Head: no trauma  Eyes: no vision changes, no eye pain  ENT:  no sore throat, no rhinorrhea  Cardiovascular:  no chest pain, no palpitation  Respiratory:  no SOB, no cough  GI:  no abd pain, no vomiting, no diarrhea  urinary: no dysuria, no hematuria, no flank pain  musculoskeletal:  no joint pain, no joint swelling  skin:  no rash  neurology:  no headache, no seizure, no change in mental status  psych: no anxiety, no depression         Allergies  sulfa drugs (Unknown)        ANTIMICROBIALS:  remdesivir  IVPB 100 every 24 hours  remdesivir  IVPB    vancomycin    Solution 125 <User Schedule>      OTHER MEDS:  acetaminophen   Tablet .. 650 milliGRAM(s) Oral every 6 hours PRN  ALBUTerol    90 MICROgram(s) HFA Inhaler 2 Puff(s) Inhalation every 6 hours PRN  amLODIPine   Tablet 5 milliGRAM(s) Oral daily  aspirin  chewable 81 milliGRAM(s) Oral daily  atorvastatin 20 milliGRAM(s) Oral at bedtime  dexAMETHasone  Injectable 6 milliGRAM(s) IV Push daily  dextrose 5%. 1000 milliLiter(s) IV Continuous <Continuous>  guaiFENesin Oral Liquid (Sugar-Free) 200 milliGRAM(s) Oral every 6 hours PRN  heparin   Injectable 5000 Unit(s) SubCutaneous every 8 hours  metolazone 2.5 milliGRAM(s) Oral daily  metoprolol succinate ER 25 milliGRAM(s) Oral daily      Vital Signs Last 24 Hrs  T(C): 37 (17 Aug 2021 10:50), Max: 37.6 (16 Aug 2021 20:31)  T(F): 98.6 (17 Aug 2021 10:50), Max: 99.7 (16 Aug 2021 20:31)  HR: 71 (17 Aug 2021 10:50) (59 - 73)  BP: 152/68 (17 Aug 2021 10:50) (152/68 - 181/77)  BP(mean): --  RR: 20 (17 Aug 2021 10:50) (17 - 35)  SpO2: 100% (17 Aug 2021 10:50) (93% - 100%)    Physical Exam:  General: no acute distress, on NRB, lethargic   HEENT: AT/NC. Dry mouth   Neck: Not rigid. No sense of mass.  Nodes: None palpable.  Lungs: Poor effort, diminished breath sounds bilaterally, mild crackles bilaterally   Heart: Regular rate and rhythm. No Murmur. No rub. No gallop. No palpable thrill.  Abdomen: Bowel sounds present and normoactive. Soft. Nondistended. Nontender. No sense of mass. No organomegaly.  Extremities: No cyanosis or clubbing. No edema.   Skin: Warm. Dry. Good turgor. No rash. No vasculitic stigmata.  Psychiatric: unable    WBC Count: 5.46 K/uL ( @ 06:23)  WBC Count: 8.71 K/uL ( @ 15:47)  WBC Count: 6.94 K/uL ( @ 07:24)  WBC Count: 5.49 K/uL ( @ 07:03)                            9.6    5.46  )-----------( 196      ( 17 Aug 2021 06:23 )             30.0           148<H>  |  120<H>  |  30<H>  ----------------------------<  116<H>  4.4   |  24  |  1.08    Ca    8.0<L>      17 Aug 2021 06:23    TPro  5.7<L>  /  Alb  1.4<L>  /  TBili  0.5  /  DBili  .09  /  AST  51<H>  /  ALT  29  /  AlkPhos  85        Urinalysis Basic - ( 17 Aug 2021 02:58 )    Color: Yellow / Appearance: Slightly Turbid / S.010 / pH: x  Gluc: x / Ketone: Negative  / Bili: Negative / Urobili: Negative mg/dL   Blood: x / Protein: 30 mg/dL / Nitrite: Negative   Leuk Esterase: Trace / RBC: 6-10 /HPF / WBC 6-10   Sq Epi: x / Non Sq Epi: Many / Bacteria: Moderate        Creatinine Trend: 1.08<--, 1.38<--, 1.39<--, 1.46<--, 1.44<--, 1.39<--  Lactate, Blood: 1.6 mmol/L (21 @ 15:47)      MICROBIOLOGY:  v  .Blood Blood-Peripheral  21   No Growth Final  --  --      Clean Catch Clean Catch (Midstream)  21   >100,000 CFU/ml Escherichia coli  --  Escherichia coli          Rapid RVP Result: Detected ( @ 02:59)        C-Reactive Protein, Serum: 7 ()    Ferritin, Serum: 65 ()      D-Dimer Assay, Quantitative: 381 ()    Procalcitonin, Serum: 0.16 (21 @ 04:53)    SARS-CoV-2: Detected (17 Aug 2021 02:59)  SARS-CoV-2: NotDetec (2021 14:22)    RADIOLOGY:

## 2021-08-17 NOTE — PROGRESS NOTE ADULT - SUBJECTIVE AND OBJECTIVE BOX
INTERVAL HPI/OVERNIGHT EVENTS: Pt seen and examined at bedside. states she feels better than yesterday.    89y  Vital Signs Last 24 Hrs  T(C): 36.8 (17 Aug 2021 16:07), Max: 37.6 (16 Aug 2021 20:31)  T(F): 98.3 (17 Aug 2021 16:07), Max: 99.7 (16 Aug 2021 20:31)  HR: 65 (17 Aug 2021 16:07) (59 - 73)  BP: 178/67 (17 Aug 2021 16:07) (152/68 - 181/77)  BP(mean): --  RR: 19 (17 Aug 2021 16:07) (17 - 35)  SpO2: 99% (17 Aug 2021 16:07) (93% - 100%)  I&O's Summary    16 Aug 2021 07:01  -  17 Aug 2021 07:00  --------------------------------------------------------  IN: 0 mL / OUT: 450 mL / NET: -450 mL    17 Aug 2021 07:01  -  17 Aug 2021 16:54  --------------------------------------------------------  IN: 118 mL / OUT: 1000 mL / NET: -882 mL      MEDICATIONS  (STANDING):  amLODIPine   Tablet 5 milliGRAM(s) Oral daily  aspirin  chewable 81 milliGRAM(s) Oral daily  atorvastatin 20 milliGRAM(s) Oral at bedtime  dexAMETHasone  Injectable 6 milliGRAM(s) IV Push daily  dextrose 5%. 1000 milliLiter(s) (80 mL/Hr) IV Continuous <Continuous>  heparin   Injectable 5000 Unit(s) SubCutaneous every 8 hours  metolazone 2.5 milliGRAM(s) Oral daily  metoprolol succinate ER 25 milliGRAM(s) Oral daily  remdesivir  IVPB 100 milliGRAM(s) IV Intermittent every 24 hours  remdesivir  IVPB   IV Intermittent   vancomycin    Solution 125 milliGRAM(s) Oral <User Schedule>    MEDICATIONS  (PRN):  acetaminophen   Tablet .. 650 milliGRAM(s) Oral every 6 hours PRN Temp greater or equal to 38C (100.4F), Mild Pain (1 - 3), Moderate Pain (4 - 6), Severe Pain (7 - 10)  ALBUTerol    90 MICROgram(s) HFA Inhaler 2 Puff(s) Inhalation every 6 hours PRN Shortness of Breath and/or Wheezing  guaiFENesin Oral Liquid (Sugar-Free) 200 milliGRAM(s) Oral every 6 hours PRN Cough    LABS:    trop                        9.6    5.46  )-----------( 196      ( 17 Aug 2021 06:23 )             30.0     08-17    148<H>  |  120<H>  |  30<H>  ----------------------------<  116<H>  4.4   |  24  |  1.08    Ca    8.0<L>      17 Aug 2021 06:23    TPro  5.7<L>  /  Alb  1.4<L>  /  TBili  0.5  /  DBili  .09  /  AST  51<H>  /  ALT  29  /  AlkPhos  85  08-17    PT/INR - ( 17 Aug 2021 06:23 )   PT: 13.3 sec;   INR: 1.15 ratio         PTT - ( 16 Aug 2021 15:47 )  PTT:50.8 sec  Urinalysis Basic - ( 17 Aug 2021 02:58 )    Color: Yellow / Appearance: Slightly Turbid / S.010 / pH: x  Gluc: x / Ketone: Negative  / Bili: Negative / Urobili: Negative mg/dL   Blood: x / Protein: 30 mg/dL / Nitrite: Negative   Leuk Esterase: Trace / RBC: 6-10 /HPF / WBC 6-10   Sq Epi: x / Non Sq Epi: Many / Bacteria: Moderate      CAPILLARY BLOOD GLUCOSE          ABG - ( 16 Aug 2021 12:16 )  pH, Arterial: x     pH, Blood: 7.47  /  pCO2: 29    /  pO2: 93    / HCO3: 20    / Base Excess: -2.1  /  SaO2: 97                Urinalysis Basic - ( 17 Aug 2021 02:58 )    Color: Yellow / Appearance: Slightly Turbid / S.010 / pH: x  Gluc: x / Ketone: Negative  / Bili: Negative / Urobili: Negative mg/dL   Blood: x / Protein: 30 mg/dL / Nitrite: Negative   Leuk Esterase: Trace / RBC: 6-10 /HPF / WBC 6-10   Sq Epi: x / Non Sq Epi: Many / Bacteria: Moderate      REVIEW OF SYSTEMS:  unable to obtain.    RADIOLOGY & ADDITIONAL TESTS:    Imaging Personally Reviewed:  [ ] YES  [ ] NO    Consultant(s) Notes Reviewed:  [x ] YES  [ ] NO    PHYSICAL EXAM:  GENERAL: NAD, on NRB  NERVOUS SYSTEM:  Alert  CHEST/LUNG: decreased BS b/l  HEART: Regular rate and rhythm  ABDOMEN: Soft, Nontender, Nondistended; Bowel sounds present  EXTREMITIES:  2+ Peripheral Pulses, No edema      A & P:        Care Discussed with Consultants/Other Providers [ x] YES  [ ] NO

## 2021-08-17 NOTE — PROGRESS NOTE ADULT - ASSESSMENT
89F with PMHx of CAD, HTN, and Parkinson's Disease presenting from [facility] for increasing confusion.     1. COVID 19 Pneumonia  pt is hypoxic on NRB  c/w Remdesivir, decadron  Isolation precautions  ID on board.    2. r/o aspiration pneumonia  s/p vanco x1  amikacin x1  f/u Ucx, Blood cx  started on remdesivir and decadron.  f/u CXR final read    3. HTN  increased amlodipine.    4. HLD  c/w atorvastatin    5. Hypernatremia  c/w D5W   f/u bmp    6. hypokalemia  repleted    7. DONITA  resolved    8. DVT ppx  on heparin SQ.     Spoke with daughter Megan ([phone]) and discussed plan of care in detail.

## 2021-08-17 NOTE — PROGRESS NOTE ADULT - ASSESSMENT
Patient with fever.   If fever not from COVID, then in theory would be a candidate for monoclonal antibody. However it is possible that this represents a breakthrough infection in a frail elderly individual. Patients requiring inpatient care/hospitalization for COVID did not benefit from MCAB infusion. With CXR showing possible new RLL infiltrate patient could certainly could have fever from COVID. Fortunately individuals vaccinated have a markedly reduced mortality rate compared with unvaccinated individuals. Patient is not a candidate for RDV or Dex at this time.    Patient could have aspiration pneumonia, though relatively low suspicion. She has pyuria and + urine cx, but also epithelial cells on U/A indicating a degree of external contamination. History here is inadequate to discern between and the presence/absence of urinary symptoms.     8/11: Urine isolate S zosyn, at risk for aspiration with new focal R sided infiltrate. +COvID but if RLL infiltrate  from covid rather than aspiration would not be a candidate for MCAB. Not hypoxic to indicate need for RDV/Dex.   8/13: Isolated low grade temperature, otherwise appears stable. Comfortable on RA without any concern for developing pneumonia on the basis of exam (though effort suboptimal).   8/16: spiking fevers, on NC, no leukocytosis, Cr decreased 1.39, pt is not interactive, blood cultures with no growth, UC with E. Coli, tested positive for COVID 19 on 8/7, unknown for how long the pt had symptoms. The pt was given a dose of IV Vancomycin and a dose of Amikacin for possible acute infection, UTI or aspiration are in differential. Sepsis protocol was activated, Urine and blood cultures collected, CXR performed. The pt was started on remdesivir and Decadron for COVID 19. Vancomycin po taper continued.   Attending Addendum--8/16  Case reviewed with NP Dionna Waldron. Her note reviewed and modified as appropriate.   Patient personally assessed and examined.   Seen by me earlier today before NP.  Developed hypoxia Friday evening (my team was not informed), placed on O2, continued to have escalating FiO2 requirements now up to 6L NC. Mental status poorer than prior visits- less responsive.. RN states patient has not been eating well, whereas before she exhibited a decent appetite. Febrile to >101F this am, no interventions at that time (again my team not made aware). I ordered CXR prior to my assessment, not read at the time of evaluation but to my eye obvious B infiltrates compared to the last film on 8/9. Patient with hypertension, and has a cardiac history but does not clinically appear to be volume overloaded, no pleural effusions on my review of CXR to suggest CHF, and she is also hypernatremic which would suggest dehydration not volume overload. Infiltrates make PE unlikely. COVID in DDx. Recurrent aspiration in DDx though bilateral disease makes this less likely.   Ordered sepsis bundle, single doses of IV vancomycin and amikacin- cognizant of renal function, but especially in setting of C. difficile and likely COVID-related pneumonia I do not think the R:B of broad spectrum antibiotics is favorable. These drugs should have minimal impact on gut microbiota.   Started RDV, cognizant of borderline GFR, and also started steroids.  [place] guarded  Discussed with ge, RN, nursing management.    8/17: no fevers today, no leukocytosis, on NRB now, Cr normalized, GFR improved to 44, LFTs ok. Blood cultures are pending, remdesivir and decadron continued - started yesterday. Yesterday's chest xray not read, + worsening infiltrates bilaterally - personally reviewed.

## 2021-08-17 NOTE — GOALS OF CARE CONVERSATION - ADVANCED CARE PLANNING - TREATMENT GUIDELINE COMMENT
Patient's daughter will speak to her siblings to discuss GOC / code status / artificial nutrition  Patient remains full code at this time

## 2021-08-18 NOTE — PROGRESS NOTE ADULT - PROBLEM SELECTOR PLAN 1
fever most likely from COVID pneumonia  Monitor temperature curve  Monitor WBC  s/p one time dose of IV Vancomycin and Amikacin on 8/16/2021  continue remdesivir x 5 days  continue decadron x 10 days  blood cultures - 1/4 with staph epidermis - contaminant   CXR - extensive bilateral airspace disease

## 2021-08-18 NOTE — PROGRESS NOTE ADULT - SUBJECTIVE AND OBJECTIVE BOX
INTERVAL HPI/OVERNIGHT EVENTS: Pt seen and examined at bedside. Pt is on NRB    89y  Vital Signs Last 24 Hrs  T(C): 36.3 (18 Aug 2021 17:57), Max: 37 (18 Aug 2021 10:33)  T(F): 97.4 (18 Aug 2021 17:57), Max: 98.6 (18 Aug 2021 10:33)  HR: 71 (18 Aug 2021 17:57) (59 - 71)  BP: 138/- (18 Aug 2021 17:57) (132/70 - 168/57)  BP(mean): --  RR: 17 (18 Aug 2021 17:57) (17 - 18)  SpO2: 93% (18 Aug 2021 17:57) (93% - 99%)  I&O's Summary    17 Aug 2021 07:01  -  18 Aug 2021 07:00  --------------------------------------------------------  IN: 118 mL / OUT: 1200 mL / NET: -1082 mL      MEDICATIONS  (STANDING):  amLODIPine   Tablet 5 milliGRAM(s) Oral daily  aspirin  chewable 81 milliGRAM(s) Oral daily  atorvastatin 20 milliGRAM(s) Oral at bedtime  dexAMETHasone  Injectable 6 milliGRAM(s) IV Push daily  dextrose 5%. 1000 milliLiter(s) (80 mL/Hr) IV Continuous <Continuous>  enoxaparin Injectable 40 milliGRAM(s) SubCutaneous every 24 hours  metoprolol succinate ER 25 milliGRAM(s) Oral daily  remdesivir  IVPB 100 milliGRAM(s) IV Intermittent every 24 hours  remdesivir  IVPB   IV Intermittent   vancomycin    Solution 125 milliGRAM(s) Oral <User Schedule>    MEDICATIONS  (PRN):  acetaminophen   Tablet .. 650 milliGRAM(s) Oral every 6 hours PRN Temp greater or equal to 38C (100.4F), Mild Pain (1 - 3), Moderate Pain (4 - 6), Severe Pain (7 - 10)  ALBUTerol    90 MICROgram(s) HFA Inhaler 2 Puff(s) Inhalation every 6 hours PRN Shortness of Breath and/or Wheezing  guaiFENesin Oral Liquid (Sugar-Free) 200 milliGRAM(s) Oral every 6 hours PRN Cough    LABS:    trop                        9.6    5.46  )-----------( 196      ( 17 Aug 2021 06:23 )             30.0     08-18    142  |  110<H>  |  35<H>  ----------------------------<  138<H>  4.7   |  24  |  1.06    Ca    7.8<L>      18 Aug 2021 09:21    TPro  5.9<L>  /  Alb  1.5<L>  /  TBili  0.5  /  DBili  <.05  /  AST  69<H>  /  ALT  42  /  AlkPhos  99  08-18    PT/INR - ( 18 Aug 2021 10:32 )   PT: 14.5 sec;   INR: 1.26 ratio           Urinalysis Basic - ( 17 Aug 2021 02:58 )    Color: Yellow / Appearance: Slightly Turbid / S.010 / pH: x  Gluc: x / Ketone: Negative  / Bili: Negative / Urobili: Negative mg/dL   Blood: x / Protein: 30 mg/dL / Nitrite: Negative   Leuk Esterase: Trace / RBC: 6-10 /HPF / WBC 6-10   Sq Epi: x / Non Sq Epi: Many / Bacteria: Moderate      CAPILLARY BLOOD GLUCOSE            Urinalysis Basic - ( 17 Aug 2021 02:58 )    Color: Yellow / Appearance: Slightly Turbid / S.010 / pH: x  Gluc: x / Ketone: Negative  / Bili: Negative / Urobili: Negative mg/dL   Blood: x / Protein: 30 mg/dL / Nitrite: Negative   Leuk Esterase: Trace / RBC: 6-10 /HPF / WBC 6-10   Sq Epi: x / Non Sq Epi: Many / Bacteria: Moderate      REVIEW OF SYSTEMS:  awake and alert, but unable to obtain does not answer questions.    RADIOLOGY & ADDITIONAL TESTS:    Imaging Personally Reviewed:  [ ] YES  [ ] NO    Consultant(s) Notes Reviewed:  [x ] YES  [ ] NO    PHYSICAL EXAM:  GENERAL: NAD  NERVOUS SYSTEM:  Alert  CHEST/LUNG: rhonchi b/l  HEART: Regular rate and rhythm  ABDOMEN: Soft, Nontender, Nondistended; Bowel sounds present  EXTREMITIES:  1+ edema      A & P:        Care Discussed with Consultants/Other Providers [ x] YES  [ ] NO

## 2021-08-18 NOTE — PROGRESS NOTE ADULT - SUBJECTIVE AND OBJECTIVE BOX
LEANDRO YAÑEZ  [id number]    Interval History: The pt was seen and examined earlier, the pt is awake and alert, does not answer any of my questions, does not follow commands. The pt remains on NRB, no fevers, no WBC.     PAST MEDICAL & SURGICAL HISTORY:  CAD (coronary artery disease)  s/p CABG, PPM    HTN (hypertension)    HLD (hyperlipidemia)    Alzheimer&#x27;s dementia with behavioral disturbance, unspecified timing of dementia onset    Artificial pacemaker        ROS:    [x ] Unobtainable because: pt is lethargic  [ ] All other systems negative    Constitutional: no fever, no chills  Head: no trauma  Eyes: no vision changes, no eye pain  ENT:  no sore throat, no rhinorrhea  Cardiovascular:  no chest pain, no palpitation  Respiratory:  no SOB, no cough  GI:  no abd pain, no vomiting, no diarrhea  urinary: no dysuria, no hematuria, no flank pain  musculoskeletal:  no joint pain, no joint swelling  skin:  no rash  neurology:  no headache, no seizure, no change in mental status  psych: no anxiety, no depression         Allergies  sulfa drugs (Unknown)        ANTIMICROBIALS:  remdesivir  IVPB 100 every 24 hours  remdesivir  IVPB    vancomycin    Solution 125 <User Schedule>      OTHER MEDS:  acetaminophen   Tablet .. 650 milliGRAM(s) Oral every 6 hours PRN  ALBUTerol    90 MICROgram(s) HFA Inhaler 2 Puff(s) Inhalation every 6 hours PRN  amLODIPine   Tablet 5 milliGRAM(s) Oral daily  aspirin  chewable 81 milliGRAM(s) Oral daily  atorvastatin 20 milliGRAM(s) Oral at bedtime  dexAMETHasone  Injectable 6 milliGRAM(s) IV Push daily  dextrose 5%. 1000 milliLiter(s) IV Continuous <Continuous>  enoxaparin Injectable 40 milliGRAM(s) SubCutaneous every 24 hours  guaiFENesin Oral Liquid (Sugar-Free) 200 milliGRAM(s) Oral every 6 hours PRN  metoprolol succinate ER 25 milliGRAM(s) Oral daily      Vital Signs Last 24 Hrs  T(C): 37 (18 Aug 2021 10:33), Max: 37 (18 Aug 2021 10:33)  T(F): 98.6 (18 Aug 2021 10:33), Max: 98.6 (18 Aug 2021 10:33)  HR: 63 (18 Aug 2021 10:33) (59 - 65)  BP: 132/70 (18 Aug 2021 10:33) (132/70 - 178/67)  BP(mean): --  RR: 18 (18 Aug 2021 10:33) (18 - 19)  SpO2: 95% (18 Aug 2021 10:33) (95% - 99%)    Physical Exam:  General:  on NRB, lethargic, increased work of breathing, on NRB  HEENT: AT/NC. Dry mouth   Neck: Not rigid. No sense of mass.  Nodes: None palpable.  Lungs: bilateral rhonchi, increased wob, on NRB  Heart: Regular rate and rhythm. No Murmur. No rub. No gallop. No palpable thrill.  Abdomen: Bowel sounds present and normoactive. Soft. Nondistended. Nontender. No sense of mass. No organomegaly.  Extremities: No cyanosis or clubbing. mild edema of bilateral hands  Skin: Warm. Dry. fragile. No rash. No vasculitic stigmata.  Psychiatric: unable    WBC Count: 5.46 K/uL ( @ 06:23)  WBC Count: 8.71 K/uL ( @ 15:47)  WBC Count: 6.94 K/uL ( @ 07:24)  WBC Count: 5.49 K/uL ( @ 07:03)                            9.6    5.46  )-----------( 196      ( 17 Aug 2021 06:23 )             30.0           142  |  110<H>  |  35<H>  ----------------------------<  138<H>  4.7   |  24  |  1.06    Ca    7.8<L>      18 Aug 2021 09:21    TPro  5.9<L>  /  Alb  1.5<L>  /  TBili  0.5  /  DBili  <.05  /  AST  69<H>  /  ALT  42  /  AlkPhos  99        Urinalysis Basic - ( 17 Aug 2021 02:58 )    Color: Yellow / Appearance: Slightly Turbid / S.010 / pH: x  Gluc: x / Ketone: Negative  / Bili: Negative / Urobili: Negative mg/dL   Blood: x / Protein: 30 mg/dL / Nitrite: Negative   Leuk Esterase: Trace / RBC: 6-10 /HPF / WBC 6-10   Sq Epi: x / Non Sq Epi: Many / Bacteria: Moderate        Creatinine Trend: 1.06<--, 1.08<--, 1.38<--, 1.39<--, 1.46<--, 1.44<--  Lactate, Blood: 1.6 mmol/L (21 @ 15:47)      MICROBIOLOGY:  v  .Blood Blood-Peripheral  21   No growth to date.  --  Blood Culture PCR      .Blood Blood-Peripheral  21   No Growth Final  --  --      Clean Catch Clean Catch (Midstream)  21   >100,000 CFU/ml Escherichia coli  --  Escherichia coli          Rapid RVP Result: Detected ( @ 02:59)        C-Reactive Protein, Serum: 7 ()    Ferritin, Serum: 65 ()      D-Dimer Assay, Quantitative: 381 ()    Procalcitonin, Serum: 0.16 (21 @ 04:53)    SARS-CoV-2: Detected (17 Aug 2021 02:59)  SARS-CoV-2: NotDetec (2021 14:22)    RADIOLOGY:    < from: Xray Chest 1 View- PORTABLE-Urgent (Xray Chest 1 View- PORTABLE-Urgent .) (21 @ 09:52) >    EXAM:  XR CHEST PORTABLE URGENT 1V                            PROCEDURE DATE:  2021          INTERPRETATION:  Fever hypoxia.    AP chest. Prior 2021.    IMPRESSION:  Left ICD reidentified. No change heart mediastinum. Low lung volumes. Extensive new bilateral airspace disease in the mid and lower lung fields. Correlate for pulmonary edema and/or infection. No pleural effusion or pneumothorax.    --- End of Report ---    BETITO GONZALEZ MD; Attending Radiologist  This document has been electronically signed. Aug 18 2021  9:42AM    < end of copied text >

## 2021-08-18 NOTE — PROGRESS NOTE ADULT - ASSESSMENT
89F with PMHx of CAD, HTN, and Parkinson's Disease presenting from [facility] for increasing confusion.     1. Acute hypoxic Respiratory failure sec to COVID 19 Pneumonia  pt is hypoxic on NRB  CXR extensive b/l airspace disease in mid and lower lung fields.  c/w Remdesivir, decadron  Isolation precautions  ID, pulm on board.    2. HTN  increased amlodipine.    3. HLD  c/w atorvastatin    4. Hypernatremia  resolved    5. hypokalemia  repleted    6. DONITA  resolved    7. DVT ppx  on lovenox SQ.     Spoke with daughter Megan ([phone]) and discussed plan of care in detail.

## 2021-08-18 NOTE — PROVIDER CONTACT NOTE (CRITICAL VALUE NOTIFICATION) - ACTION/TREATMENT ORDERED:
PA will evaluate chart No additional orders at this time. Pt already on COVID regimen of remdesivir and vancomycin. Will have infectious disease follow up

## 2021-08-18 NOTE — PROGRESS NOTE ADULT - PROBLEM SELECTOR PLAN 5
S/P prior rx x5 days w zosyn  Aspiration precautions, as able  one time dose of Vancomycin given 8/16  one time dose of Amikacin given 8/16  CXR - extensive bilateral airspace disease - COVID 19

## 2021-08-18 NOTE — PROGRESS NOTE ADULT - ASSESSMENT
Patient with fever.   If fever not from COVID, then in theory would be a candidate for monoclonal antibody. However it is possible that this represents a breakthrough infection in a frail elderly individual. Patients requiring inpatient care/hospitalization for COVID did not benefit from MCAB infusion. With CXR showing possible new RLL infiltrate patient could certainly could have fever from COVID. Fortunately individuals vaccinated have a markedly reduced mortality rate compared with unvaccinated individuals. Patient is not a candidate for RDV or Dex at this time.    Patient could have aspiration pneumonia, though relatively low suspicion. She has pyuria and + urine cx, but also epithelial cells on U/A indicating a degree of external contamination. History here is inadequate to discern between and the presence/absence of urinary symptoms.     8/11: Urine isolate S zosyn, at risk for aspiration with new focal R sided infiltrate. +COvID but if RLL infiltrate  from covid rather than aspiration would not be a candidate for MCAB. Not hypoxic to indicate need for RDV/Dex.   8/13: Isolated low grade temperature, otherwise appears stable. Comfortable on RA without any concern for developing pneumonia on the basis of exam (though effort suboptimal).   8/16: spiking fevers, on NC, no leukocytosis, Cr decreased 1.39, pt is not interactive, blood cultures with no growth, UC with E. Coli, tested positive for COVID 19 on 8/7, unknown for how long the pt had symptoms. The pt was given a dose of IV Vancomycin and a dose of Amikacin for possible acute infection, UTI or aspiration are in differential. Sepsis protocol was activated, Urine and blood cultures collected, CXR performed. The pt was started on remdesivir and Decadron for COVID 19. Vancomycin po taper continued.   Attending Addendum--8/16  Case reviewed with NP Dionna Waldron. Her note reviewed and modified as appropriate.   Patient personally assessed and examined.   Seen by me earlier today before NP.  Developed hypoxia Friday evening (my team was not informed), placed on O2, continued to have escalating FiO2 requirements now up to 6L NC. Mental status poorer than prior visits- less responsive.. RN states patient has not been eating well, whereas before she exhibited a decent appetite. Febrile to >101F this am, no interventions at that time (again my team not made aware). I ordered CXR prior to my assessment, not read at the time of evaluation but to my eye obvious B infiltrates compared to the last film on 8/9. Patient with hypertension, and has a cardiac history but does not clinically appear to be volume overloaded, no pleural effusions on my review of CXR to suggest CHF, and she is also hypernatremic which would suggest dehydration not volume overload. Infiltrates make PE unlikely. COVID in DDx. Recurrent aspiration in DDx though bilateral disease makes this less likely.   Ordered sepsis bundle, single doses of IV vancomycin and amikacin- cognizant of renal function, but especially in setting of C. difficile and likely COVID-related pneumonia I do not think the R:B of broad spectrum antibiotics is favorable. These drugs should have minimal impact on gut microbiota.   Started RDV, cognizant of borderline GFR, and also started steroids.  [place] guarded  Discussed with ge RN, nursing management.    8/17: no fevers today, no leukocytosis, on NRB now, Cr normalized, GFR improved to 44, LFTs ok. Blood cultures are pending, remdesivir and decadron continued - started yesterday. Yesterday's chest xray not read, + worsening infiltrates bilaterally - personally reviewed.   8/18: remains afebrile, no WBC, Cr ok, AST elevated, ALT ok, CXR with Extensive new bilateral airspace disease in the mid and lower lung fields, lung exam with rhonchi bilaterally, blood cultures 1/4 with staph epidermis - most likely contaminant, will repeat blood cultures at this time. Procalcitonin 0.16, no role for antibiotics for now, remdesivir and decadron continued.      Patient with fever.   If fever not from COVID, then in theory would be a candidate for monoclonal antibody. However it is possible that this represents a breakthrough infection in a frail elderly individual. Patients requiring inpatient care/hospitalization for COVID did not benefit from MCAB infusion. With CXR showing possible new RLL infiltrate patient could certainly could have fever from COVID. Fortunately individuals vaccinated have a markedly reduced mortality rate compared with unvaccinated individuals. Patient is not a candidate for RDV or Dex at this time.    Patient could have aspiration pneumonia, though relatively low suspicion. She has pyuria and + urine cx, but also epithelial cells on U/A indicating a degree of external contamination. History here is inadequate to discern between and the presence/absence of urinary symptoms.     8/11: Urine isolate S zosyn, at risk for aspiration with new focal R sided infiltrate. +COvID but if RLL infiltrate  from covid rather than aspiration would not be a candidate for MCAB. Not hypoxic to indicate need for RDV/Dex.   8/13: Isolated low grade temperature, otherwise appears stable. Comfortable on RA without any concern for developing pneumonia on the basis of exam (though effort suboptimal).   8/16: spiking fevers, on NC, no leukocytosis, Cr decreased 1.39, pt is not interactive, blood cultures with no growth, UC with E. Coli, tested positive for COVID 19 on 8/7, unknown for how long the pt had symptoms. The pt was given a dose of IV Vancomycin and a dose of Amikacin for possible acute infection, UTI or aspiration are in differential. Sepsis protocol was activated, Urine and blood cultures collected, CXR performed. The pt was started on remdesivir and Decadron for COVID 19. Vancomycin po taper continued.   Attending Addendum--8/16  Case reviewed with NP Dionna Waldron. Her note reviewed and modified as appropriate.   Patient personally assessed and examined.   Seen by me earlier today before NP.  Developed hypoxia Friday evening (my team was not informed), placed on O2, continued to have escalating FiO2 requirements now up to 6L NC. Mental status poorer than prior visits- less responsive.. RN states patient has not been eating well, whereas before she exhibited a decent appetite. Febrile to >101F this am, no interventions at that time (again my team not made aware). I ordered CXR prior to my assessment, not read at the time of evaluation but to my eye obvious B infiltrates compared to the last film on 8/9. Patient with hypertension, and has a cardiac history but does not clinically appear to be volume overloaded, no pleural effusions on my review of CXR to suggest CHF, and she is also hypernatremic which would suggest dehydration not volume overload. Infiltrates make PE unlikely. COVID in DDx. Recurrent aspiration in DDx though bilateral disease makes this less likely.   Ordered sepsis bundle, single doses of IV vancomycin and amikacin- cognizant of renal function, but especially in setting of C. difficile and likely COVID-related pneumonia I do not think the R:B of broad spectrum antibiotics is favorable. These drugs should have minimal impact on gut microbiota.   Started RDV, cognizant of borderline GFR, and also started steroids.  [place] guarded  Discussed with ge RN, nursing management.    8/17: no fevers today, no leukocytosis, on NRB now, Cr normalized, GFR improved to 44, LFTs ok. Blood cultures are pending, remdesivir and decadron continued - started yesterday. Yesterday's chest xray not read, + worsening infiltrates bilaterally - personally reviewed.   8/18: remains afebrile, no WBC, Cr ok, AST elevated, ALT ok, CXR with Extensive new bilateral airspace disease in the mid and lower lung fields, lung exam with rhonchi bilaterally, blood cultures 1/4 with staph epidermis - most likely contaminant, will not repeat blood cultures at this time. Procalcitonin 0.16, no role for antibiotics for now, remdesivir and decadron continued.

## 2021-08-18 NOTE — CONSULT NOTE ADULT - SUBJECTIVE AND OBJECTIVE BOX
Patient is a 89y old  Female who presents with a chief complaint of Confusion (18 Aug 2021 14:16)    HPI:  89F with CAD (post-CABG), HTN, S/P PPM and Parkinson's Disease and Dementia.   Sent in from [facility] assisted [facility] for increasing confusion and fever.   Per H & P was  here in 2021 for acute hypoxic respiratory failure secondary to aspiration. She required intubation and then discharged.   Got admitted with UTI was also  found to be COVID-19 positive(( no infiltrate on CXR), Fully vaccinated in 2021 with Pfizer. Patient given 2.3 L LR bolus and Ceftriaxone for UTI and sepsis. Being followed by ID.  Hospital course complicated with increasing O2 requirement, so started on Remdeivir and Dexamethasone.  21: On NRB mask and lethargic.  Not able to provide history.    PAST MEDICAL & SURGICAL HISTORY:  CAD (coronary artery disease)  s/p CABG, PPM    HTN (hypertension)    HLD (hyperlipidemia)    Alzheimer&#x27;s dementia with behavioral disturbance, unspecified timing of dementia onset    Artificial pacemaker    FAMILY HISTORY:  No pertinent family history in first degree relatives    SOCIAL HISTORY:  not known    Allergies  sulfa drugs (Unknown)    MEDICATIONS  (STANDING):  amLODIPine   Tablet 5 milliGRAM(s) Oral daily  aspirin  chewable 81 milliGRAM(s) Oral daily  atorvastatin 20 milliGRAM(s) Oral at bedtime  dexAMETHasone  Injectable 6 milliGRAM(s) IV Push daily  dextrose 5%. 1000 milliLiter(s) (80 mL/Hr) IV Continuous <Continuous>  enoxaparin Injectable 40 milliGRAM(s) SubCutaneous every 24 hours  metoprolol succinate ER 25 milliGRAM(s) Oral daily  remdesivir  IVPB 100 milliGRAM(s) IV Intermittent every 24 hours  remdesivir  IVPB   IV Intermittent   vancomycin    Solution 125 milliGRAM(s) Oral <User Schedule>    MEDICATIONS  (PRN):  acetaminophen   Tablet .. 650 milliGRAM(s) Oral every 6 hours PRN Temp greater or equal to 38C (100.4F), Mild Pain (1 - 3), Moderate Pain (4 - 6), Severe Pain (7 - 10)  ALBUTerol    90 MICROgram(s) HFA Inhaler 2 Puff(s) Inhalation every 6 hours PRN Shortness of Breath and/or Wheezing  guaiFENesin Oral Liquid (Sugar-Free) 200 milliGRAM(s) Oral every 6 hours PRN Cough    Vital Signs Last 24 Hrs  T(C): 36.2 (18 Aug 2021 15:37), Max: 37 (18 Aug 2021 10:33)  T(F): 97.2 (18 Aug 2021 15:37), Max: 98.6 (18 Aug 2021 10:33)  HR: 67 (18 Aug 2021 15:37) (59 - 67)  BP: 136/76 (18 Aug 2021 15:37) (132/70 - 168/57)  BP(mean): --  RR: 18 (18 Aug 2021 15:37) (18 - 18)  SpO2: 94% (18 Aug 2021 15:37) (94% - 99%)    PHYSICAL EXAM:  GEN:        Lethargic, comfortable.  HEENT:    NRB mask  RESP:       no distress  CVS:        Regular rate and rhythm.   ABD:         Soft, non-tender, non-distended;   :             No costovertebral angle tenderness  SKIN:           Warm and dry.  EXTR:            No clubbing, cyanosis or edema    LABS:                        9.6    5.46  )-----------( 196      ( 17 Aug 2021 06:23 )             30.0     08-18    142  |  110<H>  |  35<H>  ----------------------------<  138<H>  4.7   |  24  |  1.06    Ca    7.8<L>      18 Aug 2021 09:21    TPro  5.9<L>  /  Alb  1.5<L>  /  TBili  0.5  /  DBili  <.05  /  AST  69<H>  /  ALT  42  /  AlkPhos  99  08-18    PT/INR - ( 18 Aug 2021 10:32 )   PT: 14.5 sec;   INR: 1.26 ratio      08-16 @ 12:16  pH: 7.47  pCO2: 29  pO2: 93  SaO2: 97    Urinalysis Basic - ( 17 Aug 2021 02:58 )    Color: Yellow / Appearance: Slightly Turbid / S.010 / pH: x  Gluc: x / Ketone: Negative  / Bili: Negative / Urobili: Negative mg/dL   Blood: x / Protein: 30 mg/dL / Nitrite: Negative   Leuk Esterase: Trace / RBC: 6-10 /HPF / WBC 6-10   Sq Epi: x / Non Sq Epi: Many / Bacteria: Moderate    Culture - Blood (collected 21 @ 18:24)  Source: .Blood Blood-Peripheral  Gram Stain (prelim) (21 @ 01:15):    Growth in aerobic bottle: Gram positive cocci in pairs  Preliminary Report (21 @ 01:15):    Growth in aerobic bottle: Gram positive cocci in pairs    ***Blood Panel PCR results on this specimen are available    approximately 3 hours after the Gram stain result.***    Gram stain, PCR, and/or culture results may not always    correspond due to difference in methodologies.    ************************************************************    This PCR assay was performed by multiplex PCR. This    Assay tests for 66 bacterial and resistance gene targets.    Please refer to the [facility] test directory    at [email or web address] for details.  Organism: Blood Culture PCR (21 @ 03:13)  Organism: Blood Culture PCR (21 @ 03:13)      -  Staphylococcus epidermidis, Methicillin resistant: Detec      Method Type: PCR    Culture - Blood (collected 21 @ 18:24)  Source: .Blood Blood-Peripheral  Preliminary Report (21 @ 19:03):    No growth to date.    Culture - Blood (collected 21 @ 00:45)  Source: .Blood Blood-Peripheral  Final Report (21 @ 01:01):    No Growth Final    Culture - Blood (collected 21 @ 00:45)  Source: .Blood Blood-Peripheral  Final Report (21 @ 01:01):    No Growth Final    Culture - Urine (collected 21 @ 00:42)  Source: Clean Catch Clean Catch (Midstream)  Final Report (08-10-21 @ 11:56):    >100,000 CFU/ml Escherichia coli  Organism: Escherichia coli (08-10-21 @ 11:56)  Organism: Escherichia coli (08-10-21 @ 11:56)      -  Amikacin: S <=16      -  Amoxicillin/Clavulanic Acid: R >16/8      -  Ampicillin: R >16 These ampicillin results predict results for amoxicillin      -  Ampicillin/Sulbactam: I  Enterobacter, Citrobacter, and Serratia may develop resistance during prolonged therapy (3-4 days)      -  Aztreonam: S <=4      -  Cefazolin: R >16 ([id number]) For uncomplicated UTI with K. pneumoniae, E. coli, or P. mirablis: LANDON <=16 is sensitive and LANDON >=32 is resistant. This also predicts results for oral agents cefaclor, cefdinir, cefpodoxime, cefprozil, cefuroxime axetil, cephalexin and locarbef for uncomplicated UTI. Note that some isolates may be susceptible to these agents while testing resistant to cefazolin.      -  Cefepime: S <=2      -  Cefoxitin: R >16      -  Ceftriaxone: S <=1 Enterobacter, Citrobacter, and Serratia may develop resistance during prolonged therapy      -  Ciprofloxacin: R >2      -  Ertapenem: S <=0.5      -  Gentamicin: S <=2      -  Imipenem: S <=1      -  Levofloxacin: R >4      -  Meropenem: S <=1      -  Nitrofurantoin: S <=32 Should not be used to treat pyelonephritis      -  Piperacillin/Tazobactam: S <=8      -  Tigecycline: S <=2      -  Tobramycin: S <=2      -  Trimethoprim/Sulfamethoxazole: S       Method Type: LANDON    EKG: sinus    RADIOLOGY & ADDITIONAL STUDIES:  < from: Xray Chest 1 View- PORTABLE-Urgent (Xray Chest 1 View- PORTABLE-Urgent .) (21 @ 09:52) >  EXAM:  XR CHEST PORTABLE URGENT 1V                          PROCEDURE DATE:  2021      INTERPRETATION:  Fever hypoxia.    AP chest. Prior 2021.    IMPRESSION:  Left ICD reidentified. No change heart mediastinum. Low lung volumes. Extensive new bilateral airspace disease in the mid and lower lung fields. Correlate for pulmonary edema and/or infection. No pleural effusion or pneumothorax.    BETITO GONZALEZ MD; Attending Radiologist  This document has been electronically signed. Aug 18 2021  9:42AM    ASSESSMENT AND PLAN:  ·	Acute hypoxic Respiratory failure.  ·	COVID Pneumonia.  ·	UTI with E Coli.  ·	Staph Epi( Methicillin resistant bacteremia).  ·	Anemia.  ·	Renal Insuffiencey.  ·	CAD with CABG.  ·	S/P PPM.  ·	Parkinsonism  ·	Hypoalbuminemia    SPO2 95% on 100% NRB masK.  Started on Remdesivir and Dexamethasone.  Will get D Dimer and procalcitonin.  Repeat CXR.  On 80 cc per hour IV fluid, (Albumin only 1.5 ). please dc or reduce it if ok with Nephrology.

## 2021-08-19 NOTE — PROGRESS NOTE ADULT - SUBJECTIVE AND OBJECTIVE BOX
LEANDRO YAÑEZ  MRN-[id number]    Follow Up:  COVID 19    Interval History: The pt was seen and examined earlier, remains on NRB, not interactive. The pt is afebrile, no new CBC.     PAST MEDICAL & SURGICAL HISTORY:  CAD (coronary artery disease)  s/p CABG, PPM    HTN (hypertension)    HLD (hyperlipidemia)    Alzheimer&#x27;s dementia with behavioral disturbance, unspecified timing of dementia onset    Artificial pacemaker        ROS:    [ x] Unobtainable because: dementia, lethargic   [ ] All other systems negative    Constitutional: no fever, no chills  Head: no trauma  Eyes: no vision changes, no eye pain  ENT:  no sore throat, no rhinorrhea  Cardiovascular:  no chest pain, no palpitation  Respiratory:  no SOB, no cough  GI:  no abd pain, no vomiting, no diarrhea  urinary: no dysuria, no hematuria, no flank pain  musculoskeletal:  no joint pain, no joint swelling  skin:  no rash  neurology:  no headache, no seizure, no change in mental status  psych: no anxiety, no depression         Allergies  sulfa drugs (Unknown)        ANTIMICROBIALS:  remdesivir  IVPB 100 every 24 hours  remdesivir  IVPB    vancomycin    Solution 125 <User Schedule>      OTHER MEDS:  acetaminophen   Tablet .. 650 milliGRAM(s) Oral every 6 hours PRN  ALBUTerol    90 MICROgram(s) HFA Inhaler 2 Puff(s) Inhalation every 6 hours PRN  amLODIPine   Tablet 5 milliGRAM(s) Oral daily  aspirin  chewable 81 milliGRAM(s) Oral daily  atorvastatin 20 milliGRAM(s) Oral at bedtime  dexAMETHasone  Injectable 6 milliGRAM(s) IV Push daily  enoxaparin Injectable 40 milliGRAM(s) SubCutaneous every 24 hours  guaiFENesin Oral Liquid (Sugar-Free) 200 milliGRAM(s) Oral every 6 hours PRN  metoprolol succinate ER 25 milliGRAM(s) Oral daily      Vital Signs Last 24 Hrs  T(C): 36.8 (19 Aug 2021 11:00), Max: 36.8 (19 Aug 2021 11:00)  T(F): 98.2 (19 Aug 2021 11:00), Max: 98.2 (19 Aug 2021 11:00)  HR: 60 (19 Aug 2021 11:00) (60 - 71)  BP: 137/68 (19 Aug 2021 11:00) (136/52 - 148/59)  BP(mean): --  RR: 20 (19 Aug 2021 11:00) (17 - 22)  SpO2: 93% (19 Aug 2021 11:00) (93% - 95%)    Physical Exam:  General:  on NRB, lethargic  HEENT: AT/NC. Dry mouth   Neck: Not rigid. No sense of mass.  Nodes: None palpable.  Lungs: bilateral rhonchi, no wheezes  Heart: Regular rate and rhythm. No Murmur. No rub. No gallop. No palpable thrill.  Abdomen: Bowel sounds present and normoactive. Soft. Nondistended. Nontender. No sense of mass. No organomegaly.  Extremities: No cyanosis or clubbing. mild edema of bilateral hands  Skin: Warm. Dry. fragile. No rash. No vasculitic stigmata.  Psychiatric: unable    WBC Count: 5.46 K/uL (08-17 @ 06:23)  WBC Count: 8.71 K/uL (08-16 @ 15:47)  WBC Count: 6.94 K/uL (08-16 @ 07:24)          08-19    x   |  x   |  x   ----------------------------<  x   x    |  x   |  1.20    Ca    7.8<L>      18 Aug 2021 09:21    TPro  6.0  /  Alb  1.8<L>  /  TBili  0.7  /  DBili  .14  /  AST  44<H>  /  ALT  39  /  AlkPhos  118  08-19          Creatinine Trend: 1.20<--, 1.06<--, 1.08<--, 1.38<--, 1.39<--, 1.46<--      MICROBIOLOGY:  v  .Blood Blood-Peripheral  08-16-21   No growth to date.  --  Blood Culture PCR      .Blood Blood-Peripheral  08-08-21   No Growth Final  --  --      Clean Catch Clean Catch (Midstream)  08-08-21   >100,000 CFU/ml Escherichia coli  --  Escherichia coli          Rapid RVP Result: Detected (08-17 @ 02:59)        C-Reactive Protein, Serum: 7 (08-08)    Ferritin, Serum: 65 (08-08)      D-Dimer Assay, Quantitative: 431 (08-19)  D-Dimer Assay, Quantitative: 381 (08-08)    Procalcitonin, Serum: 0.16 (08-17-21 @ 04:53)    SARS-CoV-2: Detected (17 Aug 2021 02:59)  SARS-CoV-2: NotDetec (27 Apr 2021 14:22)    RADIOLOGY:

## 2021-08-19 NOTE — PROGRESS NOTE ADULT - SUBJECTIVE AND OBJECTIVE BOX
INTERVAL HPI:  89F with CAD (post-CABG), HTN, S/P PPM and Parkinson's Disease and Dementia.   Sent in from [facility] assisted [facility] for increasing confusion and fever.   Per H & P was  here in April 2021 for acute hypoxic respiratory failure secondary to aspiration. She required intubation and then discharged.   Got admitted with UTI was also  found to be COVID-19 positive(( no infiltrate on CXR), Fully vaccinated in February 2021 with Pfizer. Patient given 2.3 L LR bolus and Ceftriaxone for UTI and sepsis. Being followed by ID.  Hospital course complicated with increasing O2 requirement, so started on Remdeivir and Dexamethasone.  08/18/21: On NRB mask and lethargic.  Not able to provide history.    PAST MEDICAL & SURGICAL HISTORY:  CAD (coronary artery disease)  s/p CABG, PPM    OVERNIGHT EVENTS:  Lethargic, on NRB mask.    Vital Signs Last 24 Hrs  T(C): 36.8 (19 Aug 2021 11:00), Max: 36.8 (19 Aug 2021 11:00)  T(F): 98.2 (19 Aug 2021 11:00), Max: 98.2 (19 Aug 2021 11:00)  HR: 60 (19 Aug 2021 11:00) (60 - 71)  BP: 137/68 (19 Aug 2021 11:00) (136/52 - 148/59)  BP(mean): --  RR: 20 (19 Aug 2021 11:00) (17 - 22)  SpO2: 93% (19 Aug 2021 11:00) (93% - 95%)    PHYSICAL EXAM:  GEN:        Lethargic, comfortable.  HEENT:    NRB mask  RESP:        no distress  CVS:          Regular rate and rhythm.   ABD:         Soft, non-tender, non-distended;     MEDICATIONS  (STANDING):  amLODIPine   Tablet 5 milliGRAM(s) Oral daily  aspirin  chewable 81 milliGRAM(s) Oral daily  atorvastatin 20 milliGRAM(s) Oral at bedtime  dexAMETHasone  Injectable 6 milliGRAM(s) IV Push daily  enoxaparin Injectable 40 milliGRAM(s) SubCutaneous every 24 hours  metoprolol succinate ER 25 milliGRAM(s) Oral daily  remdesivir  IVPB 100 milliGRAM(s) IV Intermittent every 24 hours  remdesivir  IVPB   IV Intermittent   vancomycin    Solution 125 milliGRAM(s) Oral <User Schedule>    MEDICATIONS  (PRN):  acetaminophen   Tablet .. 650 milliGRAM(s) Oral every 6 hours PRN Temp greater or equal to 38C (100.4F), Mild Pain (1 - 3), Moderate Pain (4 - 6), Severe Pain (7 - 10)  ALBUTerol    90 MICROgram(s) HFA Inhaler 2 Puff(s) Inhalation every 6 hours PRN Shortness of Breath and/or Wheezing  guaiFENesin Oral Liquid (Sugar-Free) 200 milliGRAM(s) Oral every 6 hours PRN Cough    LABS:    08-19    x   |  x   |  x   ----------------------------<  x   x    |  x   |  1.20    Ca    7.8<L>      18 Aug 2021 09:21    TPro  6.0  /  Alb  1.8<L>  /  TBili  0.7  /  DBili  .14  /  AST  44<H>  /  ALT  39  /  AlkPhos  118  08-19    PT/INR - ( 19 Aug 2021 08:15 )   PT: 14.6 sec;   INR: 1.27 ratio      08-16 @ 12:16  pH: 7.47  pCO2: 29  pO2: 93  SaO2: 97    ASSESSMENT AND PLAN:  ·	Acute hypoxic Respiratory failure.  ·	COVID Pneumonia.  ·	UTI with E Coli.  ·	Staph Epi( Methicillin resistant bacteremia).  ·	Anemia.  ·	Renal Insuffiencey.  ·	CAD with CABG.  ·	S/P PPM.  ·	Parkinsonism  ·	Hypoalbuminemia    CXR with bilateral infiltrates, has high proBNP, off IV fluids.  D Dimer mildly elevated.  On O2, Remdesivir and Dexamethasone.

## 2021-08-19 NOTE — PROGRESS NOTE ADULT - CONVERSATION DETAILS
Goals of care - I had a lengthy conversation with pt's daughter Megan [phone].  We discussed the comorbid conditions, hospital care, and prognosis.  We also discussed advanced directives - made aware of limited prognosis if patient were to be intubated or resuscitated, in consideration of age and comorbid conditions.  She wished to make no decision on advanced directives - that all interventions be pursued.  Therefore, pt is "full code".      Total time spent on patient care discussion = 20 minutes.

## 2021-08-19 NOTE — PROGRESS NOTE ADULT - ASSESSMENT
Patient with fever.   If fever not from COVID, then in theory would be a candidate for monoclonal antibody. However it is possible that this represents a breakthrough infection in a frail elderly individual. Patients requiring inpatient care/hospitalization for COVID did not benefit from MCAB infusion. With CXR showing possible new RLL infiltrate patient could certainly could have fever from COVID. Fortunately individuals vaccinated have a markedly reduced mortality rate compared with unvaccinated individuals. Patient is not a candidate for RDV or Dex at this time.    Patient could have aspiration pneumonia, though relatively low suspicion. She has pyuria and + urine cx, but also epithelial cells on U/A indicating a degree of external contamination. History here is inadequate to discern between and the presence/absence of urinary symptoms.     8/11: Urine isolate S zosyn, at risk for aspiration with new focal R sided infiltrate. +COvID but if RLL infiltrate  from covid rather than aspiration would not be a candidate for MCAB. Not hypoxic to indicate need for RDV/Dex.   8/13: Isolated low grade temperature, otherwise appears stable. Comfortable on RA without any concern for developing pneumonia on the basis of exam (though effort suboptimal).   8/16: spiking fevers, on NC, no leukocytosis, Cr decreased 1.39, pt is not interactive, blood cultures with no growth, UC with E. Coli, tested positive for COVID 19 on 8/7, unknown for how long the pt had symptoms. The pt was given a dose of IV Vancomycin and a dose of Amikacin for possible acute infection, UTI or aspiration are in differential. Sepsis protocol was activated, Urine and blood cultures collected, CXR performed. The pt was started on remdesivir and Decadron for COVID 19. Vancomycin po taper continued.   Attending Addendum--8/16  Case reviewed with NP Dionna Waldron. Her note reviewed and modified as appropriate.   Patient personally assessed and examined.   Seen by me earlier today before NP.  Developed hypoxia Friday evening (my team was not informed), placed on O2, continued to have escalating FiO2 requirements now up to 6L NC. Mental status poorer than prior visits- less responsive.. RN states patient has not been eating well, whereas before she exhibited a decent appetite. Febrile to >101F this am, no interventions at that time (again my team not made aware). I ordered CXR prior to my assessment, not read at the time of evaluation but to my eye obvious B infiltrates compared to the last film on 8/9. Patient with hypertension, and has a cardiac history but does not clinically appear to be volume overloaded, no pleural effusions on my review of CXR to suggest CHF, and she is also hypernatremic which would suggest dehydration not volume overload. Infiltrates make PE unlikely. COVID in DDx. Recurrent aspiration in DDx though bilateral disease makes this less likely.   Ordered sepsis bundle, single doses of IV vancomycin and amikacin- cognizant of renal function, but especially in setting of C. difficile and likely COVID-related pneumonia I do not think the R:B of broad spectrum antibiotics is favorable. These drugs should have minimal impact on gut microbiota.   Started RDV, cognizant of borderline GFR, and also started steroids.  [place] guarded  Discussed with ge RN, nursing management.    8/17: no fevers today, no leukocytosis, on NRB now, Cr normalized, GFR improved to 44, LFTs ok. Blood cultures are pending, remdesivir and decadron continued - started yesterday. Yesterday's chest xray not read, + worsening infiltrates bilaterally - personally reviewed.   8/18: remains afebrile, no WBC, Cr ok, AST elevated, ALT ok, CXR with Extensive new bilateral airspace disease in the mid and lower lung fields, lung exam with rhonchi bilaterally, blood cultures 1/4 with staph epidermis - most likely contaminant, will not repeat blood cultures at this time. Procalcitonin 0.16, no role for antibiotics for now, remdesivir and decadron continued.   Attending Addendum--  Case reviewed with NP Dionna Waldron. Her note reviewed and modified as appropriate.   Patient personally assessed and examined.   Seen earlier today.  Remains on nonrebreather facemask.  Poorly responsive.  Suspect presentation this point in time solely relates to Covid.  [place] as a consequence is guarded in this frail elderly demented woman.  8/19: the pt remains afebrile, no new CBC, on NRB, seen by pulmonology. Cr and LFTs ok, remdesivir and Decadron continued.

## 2021-08-19 NOTE — PROGRESS NOTE ADULT - SUBJECTIVE AND OBJECTIVE BOX
Patient: LEANDRO YAÑEZ [id number] 89y Female                            Hospitalist Attending Note    Remains on NRB, SaO2 ~ 93%.  Unable to offer complaints.   ____________________PHYSICAL EXAM:  GENERAL:  NAD, awake, confused.    HEENT: NCAT  CARDIOVASCULAR:  S1, S2  LUNGS: coarse BS b/l  ABDOMEN:  soft, (-) tenderness, (-) distension, (+) bowel sounds, (-) guarding, (-) rebound (-) rigidity  EXTREMITIES:  no cyanosis / clubbing / edema.   ____________________     VITALS:  Vital Signs Last 24 Hrs  T(C): 36.8 (19 Aug 2021 11:00), Max: 36.8 (19 Aug 2021 11:00)  T(F): 98.2 (19 Aug 2021 11:00), Max: 98.2 (19 Aug 2021 11:00)  HR: 60 (19 Aug 2021 11:00) (60 - 71)  BP: 137/68 (19 Aug 2021 11:00) (136/52 - 148/59)  BP(mean): --  RR: 20 (19 Aug 2021 11:00) (17 - 22)  SpO2: 93% (19 Aug 2021 11:00) (93% - 95%) Daily     Daily Weight in k.6 (19 Aug 2021 05:02)  CAPILLARY BLOOD GLUCOSE        I&O's Summary    18 Aug 2021 07:01  -  19 Aug 2021 07:00  --------------------------------------------------------  IN: 0 mL / OUT: 650 mL / NET: -650 mL        HISTORY:  PAST MEDICAL & SURGICAL HISTORY:  CAD (coronary artery disease)  s/p CABG, PPM    HTN (hypertension)    HLD (hyperlipidemia)    Alzheimer&#x27;s dementia with behavioral disturbance, unspecified timing of dementia onset    Artificial pacemaker    Allergies    sulfa drugs (Unknown)    Intolerances       LABS:      Culture - Blood (collected 16 Aug 2021 18:24)  Source: .Blood Blood-Peripheral  Gram Stain (18 Aug 2021 21:49):    Growth in aerobic bottle: Gram positive cocci in pairs  Final Report (18 Aug 2021 21:49):    Growth in aerobic bottle: Staphylococcus epidermidis    Coag Negative Staphylococcus    Single set isolate, possible contaminant. Contact    Microbiology if susceptibility testing clinically    indicated.    ***Blood Panel PCR results on this specimen are available    approximately 3 hours after the Gram stain result.***    Gram stain, PCR, and/or culture results may not always    correspond due to difference in methodologies.    ************************************************************    This PCR assay was performed by multiplex PCR. This    Assay tests for 66 bacterial and resistance gene targets.    Please refer to the [facility] test directory    at [email or web address] for details.  Organism: Blood Culture PCR (18 Aug 2021 21:49)  Organism: Blood Culture PCR (18 Aug 2021 21:49)    Culture - Blood (collected 16 Aug 2021 18:24)  Source: .Blood Blood-Peripheral  Preliminary Report (17 Aug 2021 19:03):    No growth to date.        x   |  x   |  x   ----------------------------<  x   x    |  x   |  1.20    Ca    7.8<L>      18 Aug 2021 09:21    TPro  6.0  /  Alb  1.8<L>  /  TBili  0.7  /  DBili  .14  /  AST  44<H>  /  ALT  39  /  AlkPhos  118  08-19    PT/INR - ( 19 Aug 2021 08:15 )   PT: 14.6 sec;   INR: 1.27 ratio           LIVER FUNCTIONS - ( 19 Aug 2021 08:15 )  Alb: 1.8 g/dL / Pro: 6.0 gm/dL / ALK PHOS: 118 U/L / ALT: 39 U/L / AST: 44 U/L / GGT: x                 Culture - Blood (collected 16 Aug 2021 18:24)  Source: .Blood Blood-Peripheral  Gram Stain (18 Aug 2021 21:49):    Growth in aerobic bottle: Gram positive cocci in pairs  Final Report (18 Aug 2021 21:49):    Growth in aerobic bottle: Staphylococcus epidermidis    Coag Negative Staphylococcus    Single set isolate, possible contaminant. Contact    Microbiology if susceptibility testing clinically    indicated.    ***Blood Panel PCR results on this specimen are available    approximately 3 hours after the Gram stain result.***    Gram stain, PCR, and/or culture results may not always    correspond due to difference in methodologies.    ************************************************************    This PCR assay was performed by multiplex PCR. This    Assay tests for 66 bacterial and resistance gene targets.    Please refer to the [facility] test directory    at [email or web address] for details.  Organism: Blood Culture PCR (18 Aug 2021 21:49)  Organism: Blood Culture PCR (18 Aug 2021 21:49)    Culture - Blood (collected 16 Aug 2021 18:24)  Source: .Blood Blood-Peripheral  Preliminary Report (17 Aug 2021 19:03):    No growth to date.          MEDICATIONS:  MEDICATIONS  (STANDING):  amLODIPine   Tablet 5 milliGRAM(s) Oral daily  aspirin  chewable 81 milliGRAM(s) Oral daily  atorvastatin 20 milliGRAM(s) Oral at bedtime  dexAMETHasone  Injectable 6 milliGRAM(s) IV Push daily  enoxaparin Injectable 40 milliGRAM(s) SubCutaneous every 24 hours  metoprolol succinate ER 25 milliGRAM(s) Oral daily  remdesivir  IVPB 100 milliGRAM(s) IV Intermittent every 24 hours  remdesivir  IVPB   IV Intermittent   vancomycin    Solution 125 milliGRAM(s) Oral <User Schedule>    MEDICATIONS  (PRN):  acetaminophen   Tablet .. 650 milliGRAM(s) Oral every 6 hours PRN Temp greater or equal to 38C (100.4F), Mild Pain (1 - 3), Moderate Pain (4 - 6), Severe Pain (7 - 10)  ALBUTerol    90 MICROgram(s) HFA Inhaler 2 Puff(s) Inhalation every 6 hours PRN Shortness of Breath and/or Wheezing  guaiFENesin Oral Liquid (Sugar-Free) 200 milliGRAM(s) Oral every 6 hours PRN Cough

## 2021-08-20 NOTE — PROGRESS NOTE ADULT - ATTENDING COMMENTS
Attending Addendum--  Case reviewed with ARMINDA Waldron. Her note reviewed and modified as appropriate.   Patient personally assessed and examined.   Remains poorly responsive on a nonrebreather facemask.  I do not find support for superimposed bacterial infection at this point in time.  Unfortunately suspect that this is all Covid that we are looking at this point in time.  Positive blood culture represents procurement contamination.  [place] is poor.    Randy Salas MD  Attending Physician  [facility]  Division of Infectious Diseases  [phone]
Attending Addendum--  Case reviewed with NP Dionna Waldron. Her note reviewed and modified as appropriate.   Patient personally assessed and examined.   Seen by me earlier today before NP.  Developed hypoxia Friday evening (my team was not informed), placed on O2, continued to have escalating FiO2 requirements now up to 6L NC. Mental status poorer than prior visits- less responsive.. RN states patient has not been eating well, whereas before she exhibited a decent appetite. Febrile to >101F this am, no interventions at that time (again my team not made aware). I ordered CXR prior to my assessment, not read at the time of evaluation but to my eye obvious B infiltrates compared to the last film on 8/9. Patient with hypertension, and has a cardiac history but does not clinically appear to be volume overloaded, no pleural effusions on my review of CXR to suggest CHF, and she is also hypernatremic which would suggest dehydration not volume overload. Infiltrates make PE unlikely. COVID in DDx. Recurrent aspiration in DDx though bilateral disease makes this less likely.   Ordered sepsis bundle, single doses of IV vancomycin and amikacin- cognizant of renal function, but especially in setting of C. difficile and likely COVID-related pneumonia I do not think the R:B of broad spectrum antibiotics is favorable. These drugs should have minimal impact on gut microbiota.   Started RDV, cognizant of borderline GFR, and also started steroids.  [place] guarded  Discussed with hospitallist, RN, nursing management.    Randy Salas MD  Attending Physician  [facility]  Division of Infectious Diseases  [phone]
Attending Addendum--  Case reviewed with ARMINDA Waldron. Her note reviewed and modified as appropriate.   Patient personally assessed and examined.   Seen earlier today.  Remains on nonrebreather facemask.  Poorly responsive.  Suspect presentation this point in time solely relates to Covid.  [place] as a consequence is guarded in this frail elderly demented woman.    Randy Salas MD  Attending Physician  [facility]  Division of Infectious Diseases  [phone]
Attending Addendum--  Case reviewed with ARMINDA Waldron. Her note reviewed and modified as appropriate.   Patient personally assessed and examined.   Patient remains quite ill, with hypoxemia related to COVID-19 pneumonia in the setting of underlying dementia, CAD, PPM. Increased creatinine today- has been in same range previously but need to monitor for DONITA. Will need to attempt to decrease O2, unclear how aggressive attempts have been to date. Still remains on steroids. Not a candidate for tocilizumab. Single positive blood culture remains consistent with contamination at this point in time.   Prognosis remains most poor  If any ID input is needed over the weekend, please call [phone].     I will be covering at another hospital 08/23 - 08/27.  Dr. Asa Verdin and ARMINDA Waldron are covering the service at [facility] in my absence. For issues please call [phone].    Randy Salas MD  Attending Physician  [facility]  Division of Infectious Diseases  [phone]

## 2021-08-20 NOTE — PROGRESS NOTE ADULT - SUBJECTIVE AND OBJECTIVE BOX
Patient: LEANDRO YAÑEZ [id number] 89y Female                            Hospitalist Attending Note    Remains on NRB, SaO2 ~ 92-95%.  Unable to offer complaints.  Poor po intake per RN.   ____________________PHYSICAL EXAM:  GENERAL:  NAD, arousable, confused.    HEENT: NCAT  CARDIOVASCULAR:  S1, S2  LUNGS: coarse BS b/l  ABDOMEN:  soft, (-) tenderness, (-) distension, (+) bowel sounds, (-) guarding, (-) rebound (-) rigidity  EXTREMITIES:  no cyanosis / clubbing / edema.   ____________________    VITALS:  Vital Signs Last 24 Hrs  T(C): 36.4 (20 Aug 2021 16:03), Max: 36.9 (20 Aug 2021 05:13)  T(F): 97.6 (20 Aug 2021 16:03), Max: 98.4 (20 Aug 2021 05:13)  HR: 76 (20 Aug 2021 16:03) (58 - 78)  BP: 129/72 (20 Aug 2021 16:03) (121/63 - 162/70)  BP(mean): --  RR: 18 (20 Aug 2021 16:03) (18 - 22)  SpO2: 95% (20 Aug 2021 16:03) (92% - 96%) Daily     Daily Weight in k.6 (20 Aug 2021 05:13)  CAPILLARY BLOOD GLUCOSE        I&O's Summary    19 Aug 2021 07:01  -  20 Aug 2021 07:00  --------------------------------------------------------  IN: 0 mL / OUT: 950 mL / NET: -950 mL        LABS:    08-20    x   |  x   |  x   ----------------------------<  x   x    |  x   |  1.44<H>      TPro  5.8<L>  /  Alb  1.7<L>  /  TBili  0.7  /  DBili  .19  /  AST  72<H>  /  ALT  51  /  AlkPhos  151<H>  08-20    PT/INR - ( 20 Aug 2021 08:30 )   PT: 14.8 sec;   INR: 1.29 ratio           LIVER FUNCTIONS - ( 20 Aug 2021 08:30 )  Alb: 1.7 g/dL / Pro: 5.8 gm/dL / ALK PHOS: 151 U/L / ALT: 51 U/L / AST: 72 U/L / GGT: x                     MEDICATIONS:  acetaminophen   Tablet .. 650 milliGRAM(s) Oral every 6 hours PRN  ALBUTerol    90 MICROgram(s) HFA Inhaler 2 Puff(s) Inhalation every 6 hours PRN  amLODIPine   Tablet 5 milliGRAM(s) Oral daily  aspirin  chewable 81 milliGRAM(s) Oral daily  atorvastatin 20 milliGRAM(s) Oral at bedtime  dexAMETHasone  Injectable 6 milliGRAM(s) IV Push daily  enoxaparin Injectable 40 milliGRAM(s) SubCutaneous every 24 hours  guaiFENesin Oral Liquid (Sugar-Free) 200 milliGRAM(s) Oral every 6 hours PRN  metoprolol succinate ER 25 milliGRAM(s) Oral daily  remdesivir  IVPB 100 milliGRAM(s) IV Intermittent every 24 hours  remdesivir  IVPB   IV Intermittent

## 2021-08-20 NOTE — PROGRESS NOTE ADULT - ASSESSMENT
89F with PMHx of CAD, HTN, and Parkinson's Disease presenting from [facility] for increasing confusion.  Found to have COVID infection.      # Acute hypoxic Respiratory failure sec to COVID 19 Pneumonia- pt's SaO2 is 92-95% on NRB.  CXR extensive b/l airspace disease in mid and lower lung fields.  c/w Remdesivir, decadron Isolation precautions ID, pulm on board.   # Dysphagia - daughter reports pt frequently pockets food even prior to hospitalization.  S&S evaluation.  Goals of care, artificial nutrition was extensively discussed with daughter.  She is undecided on NGT placement.   Palliative care input requested.   # HTN - will change to Catap[place] due to inability to take po.  BP generally stable.    # HLD c/w atorvastatin  # DONITA - Cr   # DVT ppx on lovenox SQ.     Spoke with daughter Megan ([phone]) and discussed plan of care in detail on 8/19, 8/20     89F with PMHx of CAD, HTN, and Parkinson's Disease presenting from [facility] for increasing confusion.  Found to have COVID infection.      # Acute hypoxic Respiratory failure sec to COVID 19 Pneumonia- pt's SaO2 is 92-95% on NRB.  CXR extensive b/l airspace disease in mid and lower lung fields.  c/w Remdesivir, decadron Isolation precautions ID, pulm on board.   # Dysphagia - daughter reports pt frequently pockets food even prior to hospitalization.  S&S evaluation.  Goals of care, artificial nutrition was extensively discussed with daughter.  She is undecided on NGT placement.   Palliative care input requested.   # HTN - will change to Catapres due to inability to take po.  BP generally stable.    # HLD c/w atorvastatin if able to tolerate.   # DONITA - Cr 1.7-1.8 on admission, now slightly improved.   # DVT ppx on lovenox SQ.     Spoke with daughter Megan ([phone]) and discussed plan of care in detail on 8/19, 8/20     89F with PMHx of CAD, HTN, and Parkinson's Disease presenting from [facility] for increasing confusion.  Found to have COVID infection.      # Acute hypoxic Respiratory failure sec to COVID 19 Pneumonia- pt's SaO2 is 92-95% on NRB.  CXR extensive b/l airspace disease in mid and lower lung fields.  c/w Remdesivir, decadron Isolation precautions ID, pulm on board.   # Dysphagia - daughter reports pt frequently pockets food even prior to hospitalization.  S&S evaluation.  Goals of care, artificial nutrition was extensively discussed with daughter.  She is undecided on NGT placement.   Palliative care input requested.   # HTN - will change to Catapres due to inability to take po.  BP generally stable.    # HLD c/w atorvastatin if able to tolerate.   # DONITA - Cr 1.7-1.8 on admission, now slightly improved.   # DVT ppx on lovenox SQ.     Spoke with daughter Megan ([phone]) and discussed plan of care in detail on 8/19, 8/20  Now more receptive to discussing goals of care, but still undecided on DNR status and artificial nutrition.  She is agreeable to a palliative care evaluation.

## 2021-08-20 NOTE — PROGRESS NOTE ADULT - PROBLEM SELECTOR PLAN 1
fever most likely from COVID pneumonia  Monitor temperature curve  Monitor WBC  s/p one time dose of IV Vancomycin and Amikacin on 8/16/2021  continue remdesivir x 5 days  continue decadron x 10 days  blood cultures - 1/4 with staph epidermis - contaminant   CXR - extensive bilateral airspace disease fever most likely from COVID pneumonia, now decreased- steroids  Monitor temperature curve  Monitor WBC please  s/p one time dose of IV Vancomycin and Amikacin on 8/16/2021  continue remdesivir x 5 days completed  continue decadron x 10 days  blood cultures - 1/4 with staph epidermis - contaminant   CXR - extensive bilateral airspace disease

## 2021-08-20 NOTE — PROGRESS NOTE ADULT - ASSESSMENT
Patient with fever.   If fever not from COVID, then in theory would be a candidate for monoclonal antibody. However it is possible that this represents a breakthrough infection in a frail elderly individual. Patients requiring inpatient care/hospitalization for COVID did not benefit from MCAB infusion. With CXR showing possible new RLL infiltrate patient could certainly could have fever from COVID. Fortunately individuals vaccinated have a markedly reduced mortality rate compared with unvaccinated individuals. Patient is not a candidate for RDV or Dex at this time.    Patient could have aspiration pneumonia, though relatively low suspicion. She has pyuria and + urine cx, but also epithelial cells on U/A indicating a degree of external contamination. History here is inadequate to discern between and the presence/absence of urinary symptoms.     8/11: Urine isolate S zosyn, at risk for aspiration with new focal R sided infiltrate. +COvID but if RLL infiltrate  from covid rather than aspiration would not be a candidate for MCAB. Not hypoxic to indicate need for RDV/Dex.   8/13: Isolated low grade temperature, otherwise appears stable. Comfortable on RA without any concern for developing pneumonia on the basis of exam (though effort suboptimal).   8/16: spiking fevers, on NC, no leukocytosis, Cr decreased 1.39, pt is not interactive, blood cultures with no growth, UC with E. Coli, tested positive for COVID 19 on 8/7, unknown for how long the pt had symptoms. The pt was given a dose of IV Vancomycin and a dose of Amikacin for possible acute infection, UTI or aspiration are in differential. Sepsis protocol was activated, Urine and blood cultures collected, CXR performed. The pt was started on remdesivir and Decadron for COVID 19. Vancomycin po taper continued.   Attending Addendum--8/16  Case reviewed with NP Dionna Waldron. Her note reviewed and modified as appropriate.   Patient personally assessed and examined.   Seen by me earlier today before NP.  Developed hypoxia Friday evening (my team was not informed), placed on O2, continued to have escalating FiO2 requirements now up to 6L NC. Mental status poorer than prior visits- less responsive.. RN states patient has not been eating well, whereas before she exhibited a decent appetite. Febrile to >101F this am, no interventions at that time (again my team not made aware). I ordered CXR prior to my assessment, not read at the time of evaluation but to my eye obvious B infiltrates compared to the last film on 8/9. Patient with hypertension, and has a cardiac history but does not clinically appear to be volume overloaded, no pleural effusions on my review of CXR to suggest CHF, and she is also hypernatremic which would suggest dehydration not volume overload. Infiltrates make PE unlikely. COVID in DDx. Recurrent aspiration in DDx though bilateral disease makes this less likely.   Ordered sepsis bundle, single doses of IV vancomycin and amikacin- cognizant of renal function, but especially in setting of C. difficile and likely COVID-related pneumonia I do not think the R:B of broad spectrum antibiotics is favorable. These drugs should have minimal impact on gut microbiota.   Started RDV, cognizant of borderline GFR, and also started steroids.  [place] guarded  Discussed with ge RN, nursing management.    8/17: no fevers today, no leukocytosis, on NRB now, Cr normalized, GFR improved to 44, LFTs ok. Blood cultures are pending, remdesivir and decadron continued - started yesterday. Yesterday's chest xray not read, + worsening infiltrates bilaterally - personally reviewed.   8/18: remains afebrile, no WBC, Cr ok, AST elevated, ALT ok, CXR with Extensive new bilateral airspace disease in the mid and lower lung fields, lung exam with rhonchi bilaterally, blood cultures 1/4 with staph epidermis - most likely contaminant, will not repeat blood cultures at this time. Procalcitonin 0.16, no role for antibiotics for now, remdesivir and decadron continued.   Attending Addendum--  Case reviewed with ARMINDA Waldron. Her note reviewed and modified as appropriate.   Patient personally assessed and examined.   Seen earlier today.  Remains on nonrebreather facemask.  Poorly responsive.  Suspect presentation this point in time solely relates to Covid.  [place] as a consequence is guarded in this frail elderly demented woman.  8/19: the pt remains afebrile, no new CBC, on NRB, seen by pulmonology. Cr and LFTs ok, remdesivir and Decadron continued.   Attending Addendum--  Case reviewed with ARMINDA Waldron. Her note reviewed and modified as appropriate.   Patient personally assessed and examined.   Remains poorly responsive on a nonrebreather facemask.  I do not find support for superimposed bacterial infection at this point in time.  Unfortunately suspect that this is all Covid that we are looking at this point in time.  Positive blood culture represents procurement contamination.  [place] is poor.  8/20: on NRB, no fevers, no new lab work, Cr elevated, today is the day #5 of remdesivir and decadron. Discussed with Dr. Pickett - attempts to wean off O2 will be made.

## 2021-08-20 NOTE — PROGRESS NOTE ADULT - SUBJECTIVE AND OBJECTIVE BOX
INTERVAL HPI:  89F with CAD (post-CABG), HTN, S/P PPM and Parkinson's Disease and Dementia.   Sent in from [facility] assisted living for increasing confusion and fever.   Per H & P was  here in April 2021 for acute hypoxic respiratory failure secondary to aspiration. She required intubation and then discharged.   Got admitted with UTI was also  found to be COVID-19 positive(( no infiltrate on CXR), Fully vaccinated in February 2021 with Pfizer. Patient given 2.3 L LR bolus and Ceftriaxone for UTI and sepsis. Being followed by ID.  Hospital course complicated with increasing O2 requirement, so started on Remdeivir and Dexamethasone.  08/18/21: On NRB mask and lethargic.  Not able to provide history.    OVERNIGHT EVENTS:  Remains lethargic and on NRB mask.    Vital Signs Last 24 Hrs  T(C): 36.8 (20 Aug 2021 11:13), Max: 36.9 (20 Aug 2021 05:13)  T(F): 98.2 (20 Aug 2021 11:13), Max: 98.4 (20 Aug 2021 05:13)  HR: 78 (20 Aug 2021 11:13) (58 - 78)  BP: 125/52 (20 Aug 2021 11:13) (121/63 - 162/70)  BP(mean): --  RR: 18 (20 Aug 2021 11:13) (18 - 22)  SpO2: 92% (20 Aug 2021 11:13) (92% - 96%)    PHYSICAL EXAM:  GEN:         comfortable.  HEENT:      Normal.    RESP:        no distress      CVS:          Regular rate and rhythm.   ABD:         Soft, non-tender, non-distended;     MEDICATIONS  (STANDING):  amLODIPine   Tablet 5 milliGRAM(s) Oral daily  aspirin  chewable 81 milliGRAM(s) Oral daily  atorvastatin 20 milliGRAM(s) Oral at bedtime  dexAMETHasone  Injectable 6 milliGRAM(s) IV Push daily  enoxaparin Injectable 40 milliGRAM(s) SubCutaneous every 24 hours  metoprolol succinate ER 25 milliGRAM(s) Oral daily  remdesivir  IVPB 100 milliGRAM(s) IV Intermittent every 24 hours  remdesivir  IVPB   IV Intermittent     MEDICATIONS  (PRN):  acetaminophen   Tablet .. 650 milliGRAM(s) Oral every 6 hours PRN Temp greater or equal to 38C (100.4F), Mild Pain (1 - 3), Moderate Pain (4 - 6), Severe Pain (7 - 10)  ALBUTerol    90 MICROgram(s) HFA Inhaler 2 Puff(s) Inhalation every 6 hours PRN Shortness of Breath and/or Wheezing  guaiFENesin Oral Liquid (Sugar-Free) 200 milliGRAM(s) Oral every 6 hours PRN Cough    LABS:    08-20    x   |  x   |  x   ----------------------------<  x   x    |  x   |  1.44<H>    TPro  5.8<L>  /  Alb  1.7<L>  /  TBili  0.7  /  DBili  .19  /  AST  72<H>  /  ALT  51  /  AlkPhos  151<H>  08-20    PT/INR - ( 20 Aug 2021 08:30 )   PT: 14.8 sec;   INR: 1.29 ratio      08-16 @ 12:16  pH: 7.47  pCO2: 29  pO2: 93  SaO2: 97    ASSESSMENT AND PLAN:  ·	Acute hypoxic Respiratory failure.  ·	COVID Pneumonia.  ·	UTI with E Coli.  ·	Staph Epi( Methicillin resistant bacteremia).  ·	Anemia.  ·	Renal Insuffiencey.  ·	CAD with CABG.  ·	S/P PPM.  ·	Parkinsonism  ·	Hypoalbuminemia.    Continue NRB mask.  Continue Remdesivir and Dexamethasone.  Will try small  dose of Lasix.

## 2021-08-20 NOTE — PROGRESS NOTE ADULT - SUBJECTIVE AND OBJECTIVE BOX
LEANDRO YAÑEZ  MRN-[id number]    Follow Up:  COVID 19    Interval History: the pt was seen and examined, awake, not interactive, on NRB. The pt is afebrile, no new lab work, cr 1.44.    PAST MEDICAL & SURGICAL HISTORY:  CAD (coronary artery disease)  s/p CABG, PPM    HTN (hypertension)    HLD (hyperlipidemia)    Alzheimer&#x27;s dementia with behavioral disturbance, unspecified timing of dementia onset    Artificial pacemaker        ROS:    [x ] Unobtainable because: dementia  [ ] All other systems negative    Constitutional: no fever, no chills  Head: no trauma  Eyes: no vision changes, no eye pain  ENT:  no sore throat, no rhinorrhea  Cardiovascular:  no chest pain, no palpitation  Respiratory:  no SOB, no cough  GI:  no abd pain, no vomiting, no diarrhea  urinary: no dysuria, no hematuria, no flank pain  musculoskeletal:  no joint pain, no joint swelling  skin:  no rash  neurology:  no headache, no seizure, no change in mental status  psych: no anxiety, no depression         Allergies  sulfa drugs (Unknown)        ANTIMICROBIALS:  remdesivir  IVPB 100 every 24 hours  remdesivir  IVPB        OTHER MEDS:  acetaminophen   Tablet .. 650 milliGRAM(s) Oral every 6 hours PRN  ALBUTerol    90 MICROgram(s) HFA Inhaler 2 Puff(s) Inhalation every 6 hours PRN  amLODIPine   Tablet 5 milliGRAM(s) Oral daily  aspirin  chewable 81 milliGRAM(s) Oral daily  atorvastatin 20 milliGRAM(s) Oral at bedtime  dexAMETHasone  Injectable 6 milliGRAM(s) IV Push daily  enoxaparin Injectable 40 milliGRAM(s) SubCutaneous every 24 hours  guaiFENesin Oral Liquid (Sugar-Free) 200 milliGRAM(s) Oral every 6 hours PRN  metoprolol succinate ER 25 milliGRAM(s) Oral daily      Vital Signs Last 24 Hrs  T(C): 36.8 (20 Aug 2021 11:13), Max: 36.9 (20 Aug 2021 05:13)  T(F): 98.2 (20 Aug 2021 11:13), Max: 98.4 (20 Aug 2021 05:13)  HR: 78 (20 Aug 2021 11:13) (58 - 78)  BP: 125/52 (20 Aug 2021 11:13) (121/63 - 162/70)  BP(mean): --  RR: 18 (20 Aug 2021 11:13) (18 - 22)  SpO2: 92% (20 Aug 2021 11:13) (92% - 96%)    Physical Exam:  General:  on NRB, lethargic - more awake today  HEENT: AT/NC. Dry mouth   Neck: Not rigid. No sense of mass.  Nodes: None palpable.  Lungs: bilateral rhonchi with mild wheeze on left  Heart: Regular rate and rhythm. No Murmur. No rub. No gallop. No palpable thrill.  Abdomen: Bowel sounds present and normoactive. Soft. Nondistended. Nontender. No sense of mass. No organomegaly.  Extremities: No cyanosis or clubbing. mild edema of bilateral hands  Skin: Warm. Dry. fragile. No rash. No vasculitic stigmata.  Psychiatric: unable    WBC Count: 5.46 K/uL (08-17 @ 06:23)  WBC Count: 8.71 K/uL (08-16 @ 15:47)  WBC Count: 6.94 K/uL (08-16 @ 07:24)          08-20    x   |  x   |  x   ----------------------------<  x   x    |  x   |  1.44<H>      TPro  5.8<L>  /  Alb  1.7<L>  /  TBili  0.7  /  DBili  .19  /  AST  72<H>  /  ALT  51  /  AlkPhos  151<H>  08-20          Creatinine Trend: 1.44<--, 1.20<--, 1.06<--, 1.08<--, 1.38<--, 1.39<--      MICROBIOLOGY:  v  .Blood Blood-Peripheral  08-16-21   No growth to date.  --  Blood Culture PCR      .Blood Blood-Peripheral  08-08-21   No Growth Final  --  --      Clean Catch Clean Catch (Midstream)  08-08-21   >100,000 CFU/ml Escherichia coli  --  Escherichia coli    Rapid RVP Result: Detected (08-17 @ 02:59)        C-Reactive Protein, Serum: 7 (08-08)    Ferritin, Serum: 65 (08-08)      D-Dimer Assay, Quantitative: 431 (08-19)  D-Dimer Assay, Quantitative: 381 (08-08)    Procalcitonin, Serum: 0.16 (08-17-21 @ 04:53)    SARS-CoV-2: Detected (17 Aug 2021 02:59)  SARS-CoV-2: NotDetec (27 Apr 2021 14:22)    RADIOLOGY:     LEANDRO YAÑEZ  MRN-[id number]    Follow Up:  COVID 19    Interval History: the pt was seen and examined, awake, not interactive, on NRB. The pt is afebrile, no new lab work, cr 1.44.    PAST MEDICAL & SURGICAL HISTORY:  CAD (coronary artery disease)  s/p CABG, PPM    HTN (hypertension)    HLD (hyperlipidemia)    Alzheimer&#x27;s dementia with behavioral disturbance, unspecified timing of dementia onset    Artificial pacemaker        ROS:    [x ] Unobtainable because: dementia  [ ] All other systems negative    Constitutional: no fever, no chills  Head: no trauma  Eyes: no vision changes, no eye pain  ENT:  no sore throat, no rhinorrhea  Cardiovascular:  no chest pain, no palpitation  Respiratory:  no SOB, no cough  GI:  no abd pain, no vomiting, no diarrhea  urinary: no dysuria, no hematuria, no flank pain  musculoskeletal:  no joint pain, no joint swelling  skin:  no rash  neurology:  no headache, no seizure, no change in mental status  psych: no anxiety, no depression         Allergies  sulfa drugs (Unknown)        ANTIMICROBIALS:  remdesivir  IVPB 100 every 24 hours  remdesivir  IVPB        OTHER MEDS:  acetaminophen   Tablet .. 650 milliGRAM(s) Oral every 6 hours PRN  ALBUTerol    90 MICROgram(s) HFA Inhaler 2 Puff(s) Inhalation every 6 hours PRN  amLODIPine   Tablet 5 milliGRAM(s) Oral daily  aspirin  chewable 81 milliGRAM(s) Oral daily  atorvastatin 20 milliGRAM(s) Oral at bedtime  dexAMETHasone  Injectable 6 milliGRAM(s) IV Push daily  enoxaparin Injectable 40 milliGRAM(s) SubCutaneous every 24 hours  guaiFENesin Oral Liquid (Sugar-Free) 200 milliGRAM(s) Oral every 6 hours PRN  metoprolol succinate ER 25 milliGRAM(s) Oral daily      Vital Signs Last 24 Hrs  T(C): 36.8 (20 Aug 2021 11:13), Max: 36.9 (20 Aug 2021 05:13)  T(F): 98.2 (20 Aug 2021 11:13), Max: 98.4 (20 Aug 2021 05:13)  HR: 78 (20 Aug 2021 11:13) (58 - 78)  BP: 125/52 (20 Aug 2021 11:13) (121/63 - 162/70)  BP(mean): --  RR: 18 (20 Aug 2021 11:13) (18 - 22)  SpO2: 92% (20 Aug 2021 11:13) (92% - 96%)    Physical Exam:  General:  on NRB, lethargic - more awake today  HEENT: AT/NC. Dry mouth   Neck: Not rigid. No sense of mass.  Nodes: None palpable.  Lungs: bilateral rhonchi with mild wheeze on left  Heart: Regular rate and rhythm. No Murmur. No rub. No gallop. No palpable thrill.  Abdomen: Bowel sounds present and normoactive. Soft. Nondistended. Nontender. No sense of mass. No organomegaly.  Extremities: No cyanosis or clubbing. mild edema of bilateral hands  Skin: Warm. Dry. fragile. No rash. No vasculitic stigmata.  Psychiatric: unable    No new CBC    08-20    x   |  x   |  x   ----------------------------<  x   x    |  x   |  1.44<H>    Creatinine Trend  1.44 mg/dL (08-20-21 @ 08:30)  1.20 mg/dL (08-19-21 @ 08:15)  1.06 mg/dL (08-18-21 @ 09:21)  1.08 mg/dL (08-17-21 @ 06:23)  1.17 mg/dL (08-17-21 @ 06:23)  1.38 mg/dL (08-16-21 @ 15:47)  1.39 mg/dL (08-16-21 @ 07:24)  1.46 mg/dL (08-15-21 @ 14:58)  1.44 mg/dL (08-15-21 @ 07:42)  1.39 mg/dL (08-14-21 @ 07:29)        TPro  5.8<L>  /  Alb  1.7<L>  /  TBili  0.7  /  DBili  .19  /  AST  72<H>  /  ALT  51  /  AlkPhos  151<H>  08-20          Creatinine Trend: 1.44<--, 1.20<--, 1.06<--, 1.08<--, 1.38<--, 1.39<--      MICROBIOLOGY:  v  .Blood Blood-Peripheral  08-16-21   No growth to date.  --  Blood Culture PCR      .Blood Blood-Peripheral  08-08-21   No Growth Final  --  --      Clean Catch Clean Catch (Midstream)  08-08-21   >100,000 CFU/ml Escherichia coli  --  Escherichia coli    Rapid RVP Result: Detected (08-17 @ 02:59)        C-Reactive Protein, Serum: 7 (08-08)    Ferritin, Serum: 65 (08-08)      D-Dimer Assay, Quantitative: 431 (08-19)  D-Dimer Assay, Quantitative: 381 (08-08)    Procalcitonin, Serum: 0.16 (08-17-21 @ 04:53)    SARS-CoV-2: Detected (17 Aug 2021 02:59)  SARS-CoV-2: NotDetec (27 Apr 2021 14:22)    RADIOLOGY:

## 2021-08-21 NOTE — PROGRESS NOTE ADULT - SUBJECTIVE AND OBJECTIVE BOX
Patient: LEANDRO YAÑEZ [id number] 89y Female                            Hospitalist Attending Note    Remains on NRB, SaO2 ~ 95-99%.  Unable to offer complaints.  Still with poor po intake.  ____________________PHYSICAL EXAM:  GENERAL:  NAD, arousable, confused.    HEENT: NCAT  CARDIOVASCULAR:  S1, S2  LUNGS: coarse BS b/l  ABDOMEN:  soft, (-) tenderness, (-) distension, (+) bowel sounds, (-) guarding, (-) rebound (-) rigidity  EXTREMITIES:  no cyanosis / clubbing / edema.   ____________________    VITALS:  Vital Signs Last 24 Hrs  T(C): 36.6 (21 Aug 2021 10:52), Max: 36.7 (21 Aug 2021 05:22)  T(F): 97.9 (21 Aug 2021 10:52), Max: 98.1 (21 Aug 2021 05:22)  HR: 76 (21 Aug 2021 10:52) (64 - 76)  BP: 147/67 (21 Aug 2021 10:52) (106/55 - 147/67)  BP(mean): --  RR: 18 (21 Aug 2021 10:52) (18 - 18)  SpO2: 95% (21 Aug 2021 10:52) (95% - 99%) Daily     Daily Weight in k.6 (21 Aug 2021 05:22)  CAPILLARY BLOOD GLUCOSE        I&O's Summary    20 Aug 2021 07:01  -  21 Aug 2021 07:00  --------------------------------------------------------  IN: 0 mL / OUT: 200 mL / NET: -200 mL        LABS:                        10.6   13.21 )-----------( 450      ( 21 Aug 2021 07:07 )             33.4     08-    148<H>  |  115<H>  |  65<H>  ----------------------------<  130<H>  4.6   |  28  |  1.62<H>    Ca    8.4<L>      21 Aug 2021 07:07    TPro  5.8<L>  /  Alb  1.8<L>  /  TBili  0.6  /  DBili  .12  /  AST  43<H>  /  ALT  43  /  AlkPhos  128<H>  08-    PT/INR - ( 21 Aug 2021 07:07 )   PT: 14.8 sec;   INR: 1.29 ratio           LIVER FUNCTIONS - ( 21 Aug 2021 07:07 )  Alb: 1.8 g/dL / Pro: 5.8 gm/dL / ALK PHOS: 128 U/L / ALT: 43 U/L / AST: 43 U/L / GGT: x                     MEDICATIONS:  acetaminophen   Tablet .. 650 milliGRAM(s) Oral every 6 hours PRN  ALBUTerol    90 MICROgram(s) HFA Inhaler 2 Puff(s) Inhalation every 6 hours PRN  aspirin  chewable 81 milliGRAM(s) Oral daily  atorvastatin 20 milliGRAM(s) Oral at bedtime  cloNIDine Patch 0.1 mG/24Hr(s) 1 patch Transdermal every 7 days  dexAMETHasone  Injectable 6 milliGRAM(s) IV Push daily  dextrose 5% + sodium chloride 0.45%. 1000 milliLiter(s) IV Continuous <Continuous>  enoxaparin Injectable 40 milliGRAM(s) SubCutaneous every 24 hours  guaiFENesin Oral Liquid (Sugar-Free) 200 milliGRAM(s) Oral every 6 hours PRN  metoprolol succinate ER 25 milliGRAM(s) Oral daily

## 2021-08-21 NOTE — PROGRESS NOTE ADULT - SUBJECTIVE AND OBJECTIVE BOX
INTERVAL HPI:  89F with CAD (post-CABG), HTN, S/P PPM and Parkinson's Disease and Dementia.   Sent in from [facility] assisted living for increasing confusion and fever.   Per H & P was  here in April 2021 for acute hypoxic respiratory failure secondary to aspiration. She required intubation and then discharged.   Got admitted with UTI was also  found to be COVID-19 positive(( no infiltrate on CXR), Fully vaccinated in February 2021 with Pfizer. Patient given 2.3 L LR bolus and Ceftriaxone for UTI and sepsis. Being followed by ID.  Hospital course complicated with increasing O2 requirement, so started on Remdeivir and Dexamethasone.  08/18/21: On NRB mask and lethargic.  Not able to provide history.    OVERNIGHT EVENTS:  Lethargic, on NRB mask    Vital Signs Last 24 Hrs  T(C): 36.8 (21 Aug 2021 16:18), Max: 36.8 (21 Aug 2021 16:18)  T(F): 98.2 (21 Aug 2021 16:18), Max: 98.2 (21 Aug 2021 16:18)  HR: 60 (21 Aug 2021 16:18) (60 - 76)  BP: 141/51 (21 Aug 2021 16:18) (106/55 - 147/67)  BP(mean): --  RR: 18 (21 Aug 2021 16:18) (18 - 18)  SpO2: 88% (21 Aug 2021 18:24) (88% - 99%)    PHYSICAL EXAM:  GEN:       Lethargic, comfortable.  HEENT:    NRB mask   RESP:       no distress  CVS:           Regular rate and rhythm.   ABD:         Soft, non-tender, non-distended;     MEDICATIONS  (STANDING):  aspirin  chewable 81 milliGRAM(s) Oral daily  atorvastatin 20 milliGRAM(s) Oral at bedtime  cloNIDine Patch 0.1 mG/24Hr(s) 1 patch Transdermal every 7 days  dexAMETHasone  Injectable 6 milliGRAM(s) IV Push daily  dextrose 5% + sodium chloride 0.45%. 1000 milliLiter(s) (60 mL/Hr) IV Continuous <Continuous>  enoxaparin Injectable 40 milliGRAM(s) SubCutaneous every 24 hours  metoprolol succinate ER 25 milliGRAM(s) Oral daily    MEDICATIONS  (PRN):  acetaminophen   Tablet .. 650 milliGRAM(s) Oral every 6 hours PRN Temp greater or equal to 38C (100.4F), Mild Pain (1 - 3), Moderate Pain (4 - 6), Severe Pain (7 - 10)  ALBUTerol    90 MICROgram(s) HFA Inhaler 2 Puff(s) Inhalation every 6 hours PRN Shortness of Breath and/or Wheezing  guaiFENesin Oral Liquid (Sugar-Free) 200 milliGRAM(s) Oral every 6 hours PRN Cough    LABS:                        10.6   13.21 )-----------( 450      ( 21 Aug 2021 07:07 )             33.4     08-21    148<H>  |  115<H>  |  65<H>  ----------------------------<  130<H>  4.6   |  28  |  1.62<H>    Ca    8.4<L>      21 Aug 2021 07:07    TPro  5.8<L>  /  Alb  1.8<L>  /  TBili  0.6  /  DBili  .12  /  AST  43<H>  /  ALT  43  /  AlkPhos  128<H>  08-21    PT/INR - ( 21 Aug 2021 07:07 )   PT: 14.8 sec;   INR: 1.29 ratio      08-16 @ 12:16  pH: 7.47  pCO2: 29  pO2: 93  SaO2: 97    ASSESSMENT AND PLAN:  ·	Acute hypoxic Respiratory failure.  ·	COVID Pneumonia.  ·	UTI with E Coli.  ·	Staph Epi( Methicillin resistant bacteremia).  ·	Anemia.  ·	Renal Insuffiencey.  ·	CAD with CABG.  ·	S/P PPM.  ·	Parkinsonism  ·	Hypoalbuminemia.    SPO2 94% on NRB mask.  Completed Remdesivir, on Dexamethasone.  DVT prophylaxis.

## 2021-08-21 NOTE — PROGRESS NOTE ADULT - ASSESSMENT
89F with PMHx of CAD, HTN, and Parkinson's Disease presenting from [facility] for increasing confusion.  Found to have COVID infection.      # Acute hypoxic Respiratory failure sec to COVID 19 Pneumonia- pt's SaO2 is improving.  CXR extensive b/l airspace disease in mid and lower lung fields.  c/w Remdesivir, decadron Isolation precautions ID, pulm on board.  Trial VM.    # Dysphagia - daughter reports pt frequently pockets food even prior to hospitalization.  S&S evaluation.  Goals of care, artificial nutrition was extensively discussed with daughter.  She is undecided on NGT placement.   Palliative care input requested.   # Hypernatremia - due to poor po intake.  Start on IVF with D51/2NS.  Monitor BMP.    # HTN - will change to Catapres due to inability to take po.  BP generally stable.    # HLD c/w atorvastatin if able to tolerate.   # DONITA - Cr 1.7-1.8 on admission, monitor BMP  # DVT ppx on lovenox SQ.     Spoke with daughter Megan ([phone]) and discussed plan of care in detail on 8/19, 8/20  Now more receptive to discussing goals of care, but still undecided on DNR status and artificial nutrition.  She is agreeable to a palliative care evaluation.

## 2021-08-22 NOTE — PROGRESS NOTE ADULT - SUBJECTIVE AND OBJECTIVE BOX
INTERVAL HPI:  89F with CAD (post-CABG), HTN, S/P PPM and Parkinson's Disease and Dementia.   Sent in from [facility] assisted living for increasing confusion and fever.   Per H & P was  here in April 2021 for acute hypoxic respiratory failure secondary to aspiration. She required intubation and then discharged.   Got admitted with UTI was also  found to be COVID-19 positive(( no infiltrate on CXR), Fully vaccinated in February 2021 with Pfizer. Patient given 2.3 L LR bolus and Ceftriaxone for UTI and sepsis. Being followed by ID.  Hospital course complicated with increasing O2 requirement, so started on Remdeivir and Dexamethasone.  08/18/21: On NRB mask and lethargic.  Not able to provide history.    OVERNIGHT EVENTS:  Still on NRB mask.    Vital Signs Last 24 Hrs  T(C): 37.1 (22 Aug 2021 12:04), Max: 37.1 (22 Aug 2021 12:04)  T(F): 98.7 (22 Aug 2021 12:04), Max: 98.7 (22 Aug 2021 12:04)  HR: 54 (22 Aug 2021 12:04) (54 - 65)  BP: 155/65 (22 Aug 2021 12:04) (135/71 - 155/65)  BP(mean): --  RR: 18 (22 Aug 2021 12:04) (18 - 18)  SpO2: 96% (22 Aug 2021 12:04) (88% - 96%)    PHYSICAL EXAM:  GEN:        Lethargic, comfortable.  HEENT:    NRB mask   RESP:       no distress  CVS:          Regular rate and rhythm.   ABD:         Soft, non-tender, non-distended;     MEDICATIONS  (STANDING):  aspirin  chewable 81 milliGRAM(s) Oral daily  atorvastatin 20 milliGRAM(s) Oral at bedtime  cloNIDine Patch 0.1 mG/24Hr(s) 1 patch Transdermal every 7 days  dexAMETHasone  Injectable 6 milliGRAM(s) IV Push daily  dextrose 5% + sodium chloride 0.45%. 1000 milliLiter(s) (100 mL/Hr) IV Continuous <Continuous>  enoxaparin Injectable 40 milliGRAM(s) SubCutaneous every 24 hours  metoprolol succinate ER 25 milliGRAM(s) Oral daily    MEDICATIONS  (PRN):  acetaminophen   Tablet .. 650 milliGRAM(s) Oral every 6 hours PRN Temp greater or equal to 38C (100.4F), Mild Pain (1 - 3), Moderate Pain (4 - 6), Severe Pain (7 - 10)  ALBUTerol    90 MICROgram(s) HFA Inhaler 2 Puff(s) Inhalation every 6 hours PRN Shortness of Breath and/or Wheezing  guaiFENesin Oral Liquid (Sugar-Free) 200 milliGRAM(s) Oral every 6 hours PRN Cough    LABS:                        10.6   13.21 )-----------( 450      ( 21 Aug 2021 07:07 )             33.4     08-22    x   |  x   |  x   ----------------------------<  x   x    |  x   |  1.54<H>    Ca    8.4<L>      21 Aug 2021 07:07    TPro  6.0  /  Alb  1.8<L>  /  TBili  0.6  /  DBili  .16  /  AST  20  /  ALT  38  /  AlkPhos  123<H>  08-22    PT/INR - ( 22 Aug 2021 08:49 )   PT: 15.2 sec;   INR: 1.33 ratio      08-16 @ 12:16  pH: 7.47  pCO2: 29  pO2: 93  SaO2: 97  ASSESSMENT AND PLAN:  ·	Acute hypoxic Respiratory failure.  ·	COVID Pneumonia.  ·	UTI with E Coli.  ·	Staph Epi( Methicillin resistant bacteremia).  ·	Anemia.  ·	Renal Insuffiencey.  ·	CAD with CABG.  ·	S/P PPM.  ·	Parkinsonism  ·	Hypoalbuminemia.    SPO2 mid to high 90s on NRB mask, can try on Venti mask tomorrow.  Completed Remdesivir, on Dexamethasone.  DVT prophylaxis.

## 2021-08-22 NOTE — PROGRESS NOTE ADULT - SUBJECTIVE AND OBJECTIVE BOX
Patient: LEANDRO YAÑEZ [id number] 89y Female                            Hospitalist Attending Note    Remains on NRB, SaO2 ~ 88-96%.  Unable to offer complaints.  Still with poor po intake.    ____________________PHYSICAL EXAM:  GENERAL:  NAD, arousable, confused.    HEENT: NCAT  CARDIOVASCULAR:  S1, S2  LUNGS: coarse BS b/l  ABDOMEN:  soft, (-) tenderness, (-) distension, (+) bowel sounds, (-) guarding, (-) rebound (-) rigidity  EXTREMITIES:  no cyanosis / clubbing / edema.   ____________________    VITALS:  Vital Signs Last 24 Hrs  T(C): 37.1 (22 Aug 2021 12:04), Max: 37.1 (22 Aug 2021 12:04)  T(F): 98.7 (22 Aug 2021 12:04), Max: 98.7 (22 Aug 2021 12:04)  HR: 54 (22 Aug 2021 12:04) (54 - 65)  BP: 155/65 (22 Aug 2021 12:04) (135/71 - 155/65)  BP(mean): --  RR: 18 (22 Aug 2021 12:04) (18 - 18)  SpO2: 96% (22 Aug 2021 12:04) (88% - 96%) Daily     Daily Weight in k.8 (22 Aug 2021 05:59)  CAPILLARY BLOOD GLUCOSE        I&O's Summary    21 Aug 2021 07:01  -  22 Aug 2021 07:00  --------------------------------------------------------  IN: 180 mL / OUT: 600 mL / NET: -420 mL        LABS:                        10.6   13.21 )-----------( 450      ( 21 Aug 2021 07:07 )             33.4     08-22    x   |  x   |  x   ----------------------------<  x   x    |  x   |  1.54<H>    Ca    8.4<L>      21 Aug 2021 07:07    TPro  6.0  /  Alb  1.8<L>  /  TBili  0.6  /  DBili  .16  /  AST  20  /  ALT  38  /  AlkPhos  123<H>  08-22    PT/INR - ( 22 Aug 2021 08:49 )   PT: 15.2 sec;   INR: 1.33 ratio           LIVER FUNCTIONS - ( 22 Aug 2021 08:49 )  Alb: 1.8 g/dL / Pro: 6.0 gm/dL / ALK PHOS: 123 U/L / ALT: 38 U/L / AST: 20 U/L / GGT: x                     MEDICATIONS:  acetaminophen   Tablet .. 650 milliGRAM(s) Oral every 6 hours PRN  ALBUTerol    90 MICROgram(s) HFA Inhaler 2 Puff(s) Inhalation every 6 hours PRN  aspirin  chewable 81 milliGRAM(s) Oral daily  atorvastatin 20 milliGRAM(s) Oral at bedtime  cloNIDine Patch 0.1 mG/24Hr(s) 1 patch Transdermal every 7 days  dexAMETHasone  Injectable 6 milliGRAM(s) IV Push daily  dextrose 5% + sodium chloride 0.45%. 1000 milliLiter(s) IV Continuous <Continuous>  enoxaparin Injectable 40 milliGRAM(s) SubCutaneous every 24 hours  guaiFENesin Oral Liquid (Sugar-Free) 200 milliGRAM(s) Oral every 6 hours PRN  metoprolol succinate ER 25 milliGRAM(s) Oral daily

## 2021-08-22 NOTE — PROGRESS NOTE ADULT - ASSESSMENT
89F with PMHx of CAD, HTN, and Parkinson's Disease presenting from [facility] for increasing confusion.  Found to have COVID infection.      # Acute hypoxic Respiratory failure sec to COVID 19 Pneumonia- pt's SaO2 is improving.  CXR extensive b/l airspace disease in mid and lower lung fields.  c/w Remdesivir, decadron Isolation precautions ID, pulm on board.  Trial VM.    # Dysphagia - continued poor intake.  Goals of care, artificial nutrition was extensively discussed with daughter.  She is undecided on NGT placement.   Palliative care input requested.   # Hypernatremia - due to poor po intake.  Increased IVF D5 1/2NS.  Monitor BMP.    # HTN - will change to Catapres due to inability to take po.  BP generally stable.    # HLD c/w atorvastatin if able to tolerate.   # DONITA - Cr 1.7-1.8 on admission, monitor BMP  # DVT ppx on lovenox SQ.     Spoke with daughter Megna ([phone]) and discussed plan of care in detail on 8/19, 8/20, 8/22  Now more receptive to discussing goals of care, but still undecided on DNR status and artificial nutrition.  She is agreeable to a palliative care evaluation.

## 2021-08-23 NOTE — GOALS OF CARE CONVERSATION - ADVANCED CARE PLANNING - CONVERSATION DETAILS
Goals of care - I had a lengthy conversation with pt's daughter Megan.    We discussed the comorbid conditions, hospital care, and prognosis.  We also discussed advanced directives - made aware of limited prognosis if patient were to be intubated or resuscitated, in consideration of age and comorbid conditions.    At this point, daughter acknowledges that pt is deteriorating despite aggressive medical therapy.  She agreed with DNR / DNI / no PEG.  She states her mother never would've wanted to be "attached to tubes".     Total time spent on patient care discussion = 20 minutes.

## 2021-08-23 NOTE — PROGRESS NOTE ADULT - SUBJECTIVE AND OBJECTIVE BOX
Patient: LEANDRO YAÑEZ [id number] 89y Female                            Hospitalist Attending Note    Remains on NRB, SaO2 ~ 88-96%.  Unable to offer complaints.  Still with poor po intake.    ____________________PHYSICAL EXAM:  GENERAL:  NAD, opens eyes to voice, confused.    HEENT: NCAT  CARDIOVASCULAR:  S1, S2  LUNGS: coarse BS b/l  ABDOMEN:  soft, (-) tenderness, (-) distension, (+) bowel sounds, (-) guarding, (-) rebound (-) rigidity  EXTREMITIES:  no cyanosis / clubbing / edema.   ____________________    VITALS:  Vital Signs Last 24 Hrs  T(C): 36.3 (23 Aug 2021 10:19), Max: 37 (23 Aug 2021 04:58)  T(F): 97.3 (23 Aug 2021 10:19), Max: 98.6 (23 Aug 2021 04:58)  HR: 75 (23 Aug 2021 10:19) (63 - 75)  BP: 162/58 (23 Aug 2021 10:19) (154/62 - 162/60)  BP(mean): --  RR: 20 (23 Aug 2021 10:19) (18 - 20)  SpO2: 100% (23 Aug 2021 10:19) (99% - 100%) Daily     Daily Weight in k.6 (23 Aug 2021 04:58)  CAPILLARY BLOOD GLUCOSE        I&O's Summary    22 Aug 2021 07:01  -  23 Aug 2021 07:00  --------------------------------------------------------  IN: 700 mL / OUT: 350 mL / NET: 350 mL        LABS:                        10.2   11.20 )-----------( 379      ( 23 Aug 2021 06:15 )             32.7     08-23    152<H>  |  119<H>  |  49<H>  ----------------------------<  171<H>  4.5   |  29  |  1.39<H>    Ca    8.5      23 Aug 2021 06:15    TPro  6.0  /  Alb  1.5<L>  /  TBili  0.5  /  DBili  .18  /  AST  12<L>  /  ALT  29  /  AlkPhos  117  08-    PT/INR - ( 23 Aug 2021 06:15 )   PT: 14.3 sec;   INR: 1.25 ratio           LIVER FUNCTIONS - ( 23 Aug 2021 06:16 )  Alb: 1.5 g/dL / Pro: 6.0 gm/dL / ALK PHOS: 117 U/L / ALT: 29 U/L / AST: 12 U/L / GGT: x                     MEDICATIONS:  acetaminophen   Tablet .. 650 milliGRAM(s) Oral every 6 hours PRN  ALBUTerol    90 MICROgram(s) HFA Inhaler 2 Puff(s) Inhalation every 6 hours PRN  aspirin  chewable 81 milliGRAM(s) Oral daily  atorvastatin 20 milliGRAM(s) Oral at bedtime  cloNIDine Patch 0.1 mG/24Hr(s) 1 patch Transdermal every 7 days  dexAMETHasone  Injectable 6 milliGRAM(s) IV Push daily  dextrose 5% + sodium chloride 0.45%. 1000 milliLiter(s) IV Continuous <Continuous>  enoxaparin Injectable 40 milliGRAM(s) SubCutaneous every 24 hours  guaiFENesin Oral Liquid (Sugar-Free) 200 milliGRAM(s) Oral every 6 hours PRN  metoprolol succinate ER 25 milliGRAM(s) Oral daily

## 2021-08-23 NOTE — PROGRESS NOTE ADULT - ASSESSMENT
Patient with fever.   If fever not from COVID, then in theory would be a candidate for monoclonal antibody. However it is possible that this represents a breakthrough infection in a frail elderly individual. Patients requiring inpatient care/hospitalization for COVID did not benefit from MCAB infusion. With CXR showing possible new RLL infiltrate patient could certainly could have fever from COVID. Fortunately individuals vaccinated have a markedly reduced mortality rate compared with unvaccinated individuals. Patient is not a candidate for RDV or Dex at this time.    Patient could have aspiration pneumonia, though relatively low suspicion. She has pyuria and + urine cx, but also epithelial cells on U/A indicating a degree of external contamination. History here is inadequate to discern between and the presence/absence of urinary symptoms.     8/11: Urine isolate S zosyn, at risk for aspiration with new focal R sided infiltrate. +COvID but if RLL infiltrate  from covid rather than aspiration would not be a candidate for MCAB. Not hypoxic to indicate need for RDV/Dex.   8/13: Isolated low grade temperature, otherwise appears stable. Comfortable on RA without any concern for developing pneumonia on the basis of exam (though effort suboptimal).   8/16: spiking fevers, on NC, no leukocytosis, Cr decreased 1.39, pt is not interactive, blood cultures with no growth, UC with E. Coli, tested positive for COVID 19 on 8/7, unknown for how long the pt had symptoms. The pt was given a dose of IV Vancomycin and a dose of Amikacin for possible acute infection, UTI or aspiration are in differential. Sepsis protocol was activated, Urine and blood cultures collected, CXR performed. The pt was started on remdesivir and Decadron for COVID 19. Vancomycin po taper continued.   Attending Addendum--8/16  Case reviewed with NP Dionna Waldron. Her note reviewed and modified as appropriate.   Patient personally assessed and examined.   Seen by me earlier today before NP.  Developed hypoxia Friday evening (my team was not informed), placed on O2, continued to have escalating FiO2 requirements now up to 6L NC. Mental status poorer than prior visits- less responsive.. RN states patient has not been eating well, whereas before she exhibited a decent appetite. Febrile to >101F this am, no interventions at that time (again my team not made aware). I ordered CXR prior to my assessment, not read at the time of evaluation but to my eye obvious B infiltrates compared to the last film on 8/9. Patient with hypertension, and has a cardiac history but does not clinically appear to be volume overloaded, no pleural effusions on my review of CXR to suggest CHF, and she is also hypernatremic which would suggest dehydration not volume overload. Infiltrates make PE unlikely. COVID in DDx. Recurrent aspiration in DDx though bilateral disease makes this less likely.   Ordered sepsis bundle, single doses of IV vancomycin and amikacin- cognizant of renal function, but especially in setting of C. difficile and likely COVID-related pneumonia I do not think the R:B of broad spectrum antibiotics is favorable. These drugs should have minimal impact on gut microbiota.   Started RDV, cognizant of borderline GFR, and also started steroids.  [place] guarded  Discussed with ge RN, nursing management.    8/17: no fevers today, no leukocytosis, on NRB now, Cr normalized, GFR improved to 44, LFTs ok. Blood cultures are pending, remdesivir and decadron continued - started yesterday. Yesterday's chest xray not read, + worsening infiltrates bilaterally - personally reviewed.   8/18: remains afebrile, no WBC, Cr ok, AST elevated, ALT ok, CXR with Extensive new bilateral airspace disease in the mid and lower lung fields, lung exam with rhonchi bilaterally, blood cultures 1/4 with staph epidermis - most likely contaminant, will not repeat blood cultures at this time. Procalcitonin 0.16, no role for antibiotics for now, remdesivir and decadron continued.   Attending Addendum--  Case reviewed with ARMINDA Waldron. Her note reviewed and modified as appropriate.   Patient personally assessed and examined.   Seen earlier today.  Remains on nonrebreather facemask.  Poorly responsive.  Suspect presentation this point in time solely relates to Covid.  [place] as a consequence is guarded in this frail elderly demented woman.  8/19: the pt remains afebrile, no new CBC, on NRB, seen by pulmonology. Cr and LFTs ok, remdesivir and Decadron continued.   Attending Addendum--  Case reviewed with ARMINDA Waldron. Her note reviewed and modified as appropriate.   Patient personally assessed and examined.   Remains poorly responsive on a nonrebreather facemask.  I do not find support for superimposed bacterial infection at this point in time.  Unfortunately suspect that this is all Covid that we are looking at this point in time.  Positive blood culture represents procurement contamination.  [place] is poor.  8/20: on NRB, no fevers, no new lab work, Cr elevated, today is the day #5 of remdesivir and decadron. Discussed with Dr. Pickett - attempts to wean off O2 will be made.   Attending Addendum--  Case reviewed with NP Dionna Waldron. Her note reviewed and modified as appropriate.   Patient personally assessed and examined.   Patient remains quite ill, with hypoxemia related to COVID-19 pneumonia in the setting of underlying dementia, CAD, PPM. Increased creatinine today- has been in same range previously but need to monitor for DONITA. Will need to attempt to decrease O2, unclear how aggressive attempts have been to date. Still remains on steroids. Not a candidate for tocilizumab. Single positive blood culture remains consistent with contamination at this point in time.   Prognosis remains most poor  8/23: remains on NRB, no fevers, WBC decreased 11.2, LFTs ok, Cr decreased 1.39. The pt is s/p course of remdesivir, on Decadron day #8.

## 2021-08-23 NOTE — PROGRESS NOTE ADULT - SUBJECTIVE AND OBJECTIVE BOX
INTERVAL HPI:  89F with CAD (post-CABG), HTN, S/P PPM and Parkinson's Disease and Dementia.   Sent in from [facility] assisted living for increasing confusion and fever.   Per H & P was  here in April 2021 for acute hypoxic respiratory failure secondary to aspiration. She required intubation and then discharged.   Got admitted with UTI was also  found to be COVID-19 positive(( no infiltrate on CXR), Fully vaccinated in February 2021 with Pfizer. Patient given 2.3 L LR bolus and Ceftriaxone for UTI and sepsis. Being followed by ID.  Hospital course complicated with increasing O2 requirement, so started on Remdeivir and Dexamethasone.  08/18/21: On NRB mask and lethargic.  Not able to provide history.    OVERNIGHT EVENTS:  SPO2 in high 90s on NRB mask    Vital Signs Last 24 Hrs  T(C): 36.3 (23 Aug 2021 16:00), Max: 37 (23 Aug 2021 04:58)  T(F): 97.3 (23 Aug 2021 16:00), Max: 98.6 (23 Aug 2021 04:58)  HR: 76 (23 Aug 2021 16:00) (63 - 76)  BP: 158/68 (23 Aug 2021 16:00) (154/62 - 162/58)  BP(mean): --  RR: 18 (23 Aug 2021 16:00) (18 - 20)  SpO2: 94% (23 Aug 2021 16:00) (94% - 100%)    PHYSICAL EXAM:  GEN:        Lethargic, comfortable.  HEENT:    NRB mask  RESP:       no distress  CVS:         Regular rate and rhythm.   ABD:         Soft, non-tender, non-distended;     MEDICATIONS  (STANDING):  aspirin  chewable 81 milliGRAM(s) Oral daily  atorvastatin 20 milliGRAM(s) Oral at bedtime  cloNIDine Patch 0.1 mG/24Hr(s) 1 patch Transdermal every 7 days  dexAMETHasone  Injectable 6 milliGRAM(s) IV Push daily  dextrose 5%. 1000 milliLiter(s) (100 mL/Hr) IV Continuous <Continuous>  dextrose 5%. 500 milliLiter(s) (75 mL/Hr) IV Continuous <Continuous>  enoxaparin Injectable 40 milliGRAM(s) SubCutaneous every 24 hours  ethacrynic acid 25 milliGRAM(s) Oral daily  metoprolol succinate ER 25 milliGRAM(s) Oral daily    MEDICATIONS  (PRN):  acetaminophen   Tablet .. 650 milliGRAM(s) Oral every 6 hours PRN Temp greater or equal to 38C (100.4F), Mild Pain (1 - 3), Moderate Pain (4 - 6), Severe Pain (7 - 10)  ALBUTerol    90 MICROgram(s) HFA Inhaler 2 Puff(s) Inhalation every 6 hours PRN Shortness of Breath and/or Wheezing  guaiFENesin Oral Liquid (Sugar-Free) 200 milliGRAM(s) Oral every 6 hours PRN Cough    LABS:                        10.2   11.20 )-----------( 379      ( 23 Aug 2021 06:15 )             32.7     08-23    152<H>  |  119<H>  |  49<H>  ----------------------------<  171<H>  4.5   |  29  |  1.39<H>    Ca    8.5      23 Aug 2021 06:15    TPro  6.0  /  Alb  1.5<L>  /  TBili  0.5  /  DBili  .18  /  AST  12<L>  /  ALT  29  /  AlkPhos  117  08-23    PT/INR - ( 23 Aug 2021 06:15 )   PT: 14.3 sec;   INR: 1.25 ratio       ASSESSMENT AND PLAN:  ·	Acute hypoxic Respiratory failure.  ·	COVID Pneumonia.  ·	UTI with E Coli.  ·	Staph Epi( Methicillin resistant bacteremia).  ·	Anemia.  ·	Renal Insuffiencey.  ·	CAD with CABG.  ·	S/P PPM.  ·	Parkinsonism  ·	Hypoalbuminemia.    Completed Remdesivir, on Dexamethasone.  SPO2 mid to high 90s on NRB mask,  Can try on Venti mask tomorrow,

## 2021-08-23 NOTE — PROGRESS NOTE ADULT - SUBJECTIVE AND OBJECTIVE BOX
[facility] NEPHROLOGY SERVICES, [facility]  NEPHROLOGY AND HYPERTENSION  [address]  [address]  [place] [address]  [phone]    MD IRMA CASTANEDA, MD RM PERALTA MD YELENA ROSENBERG, MD CLOVIS MAIN, MD ARMANDO DOAN MD          Patient events noted  No distress    MEDICATIONS  (STANDING):  aspirin  chewable 81 milliGRAM(s) Oral daily  atorvastatin 20 milliGRAM(s) Oral at bedtime  cloNIDine Patch 0.1 mG/24Hr(s) 1 patch Transdermal every 7 days  dexAMETHasone  Injectable 6 milliGRAM(s) IV Push daily  dextrose 5%. 1000 milliLiter(s) (100 mL/Hr) IV Continuous <Continuous>  dextrose 5%. 500 milliLiter(s) (75 mL/Hr) IV Continuous <Continuous>  enoxaparin Injectable 40 milliGRAM(s) SubCutaneous every 24 hours  ethacrynic acid 25 milliGRAM(s) Oral daily  metoprolol succinate ER 25 milliGRAM(s) Oral daily    MEDICATIONS  (PRN):  acetaminophen   Tablet .. 650 milliGRAM(s) Oral every 6 hours PRN Temp greater or equal to 38C (100.4F), Mild Pain (1 - 3), Moderate Pain (4 - 6), Severe Pain (7 - 10)  ALBUTerol    90 MICROgram(s) HFA Inhaler 2 Puff(s) Inhalation every 6 hours PRN Shortness of Breath and/or Wheezing  guaiFENesin Oral Liquid (Sugar-Free) 200 milliGRAM(s) Oral every 6 hours PRN Cough      08-22-21 @ 07:01  -  08-23-21 @ 07:00  --------------------------------------------------------  IN: 700 mL / OUT: 350 mL / NET: 350 mL      PHYSICAL EXAM:      T(C): 36.3 (08-23-21 @ 10:19), Max: 37 (08-23-21 @ 04:58)  HR: 75 (08-23-21 @ 10:19) (63 - 75)  BP: 162/58 (08-23-21 @ 10:19) (154/62 - 162/60)  RR: 20 (08-23-21 @ 10:19) (18 - 20)  SpO2: 100% (08-23-21 @ 10:19) (99% - 100%)  Wt(kg): --  Lungs mild scattered rhonchi  Heart S1S2  Abd soft NT ND  Extremities:   tr edema                                    10.2   11.20 )-----------( 379      ( 23 Aug 2021 06:15 )             32.7     08-23    152<H>  |  119<H>  |  49<H>  ----------------------------<  171<H>  4.5   |  29  |  1.39<H>    Ca    8.5      23 Aug 2021 06:15    TPro  6.0  /  Alb  1.5<L>  /  TBili  0.5  /  DBili  .18  /  AST  12<L>  /  ALT  29  /  AlkPhos  117  08-23      LIVER FUNCTIONS - ( 23 Aug 2021 06:16 )  Alb: 1.5 g/dL / Pro: 6.0 gm/dL / ALK PHOS: 117 U/L / ALT: 29 U/L / AST: 12 U/L / GGT: x           Creatinine Trend: 1.39<--, 1.34<--, 1.54<--, 1.62<--, 1.44<--, 1.20<--    Trend Bun/Cr  08-23-21 @ 06:15  BUN/CR -  49<H> / 1.39<H>  08-22-21 @ 17:24  BUN/CR -  57<H> / 1.34<H>  08-22-21 @ 08:49  BUN/CR -  -- / 1.54<H>  08-21-21 @ 07:07  BUN/CR -  65<H> / 1.62<H>  08-20-21 @ 08:30  BUN/CR -  -- / 1.44<H>  08-19-21 @ 08:15  BUN/CR -  -- / 1.20  08-18-21 @ 09:21  BUN/CR -  35<H> / 1.06  08-17-21 @ 06:23  BUN/CR -  30<H> / 1.08  08-16-21 @ 15:47  BUN/CR -  32<H> / 1.38<H>  08-16-21 @ 07:24  BUN/CR -  26<H> / 1.39<H>  08-15-21 @ 14:58  BUN/CR -  32<H> / 1.46<H>  08-15-21 @ 07:42  BUN/CR -  34<H> / 1.44<H>    Assessment   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

## 2021-08-23 NOTE — PROGRESS NOTE ADULT - ASSESSMENT
89F with PMHx of CAD, HTN, and Parkinson's Disease presenting from [facility] for increasing confusion.  Found to have COVID infection.      # Acute hypoxic Respiratory failure sec to COVID 19 Pneumonia- pt's SaO2 is improving.  CXR extensive b/l airspace disease in mid and lower lung fields.  c/w Remdesivir, decadron Isolation precautions ID, pulm on board.  Trial VM.    # Dysphagia - continued poor intake.  Goals of care, artificial nutrition was extensively discussed with daughter.  She is not consenting to NGT placement.  Awaiting palliative care   # Hypernatremia - due to poor po intake.  Na is increasing.  Started on D5W x500ml, then resume Increased IVF D5 1/2NS.  Monitor BMP.    # HTN - will change to Catapres due to inability to take po.  BP generally stable.    # HLD c/w atorvastatin if able to tolerate.   # DONITA - Cr 1.7-1.8 on admission, monitor BMP  # DVT ppx on lovenox SQ.     Spoke with daughter Megan ([phone]) and discussed plan of care in detail on 8/19, 8/20, 8/22  Despite multiple conversations outlining pt's poor prognosis.  she is receptive to further conversations, but reluctant to commit to advanced directives and artificial nutrition.  It appears that she is opting for more conservative management, specifically requested that I do not insert an NGT at present.  She is aware of further deterioration / death.

## 2021-08-23 NOTE — PROGRESS NOTE ADULT - PROBLEM SELECTOR PLAN 1
fever most likely from COVID pneumonia, now decreased- steroids  Monitor temperature curve  Monitor WBC please  s/p one time dose of IV Vancomycin and Amikacin on 8/16/2021  remdesivir x 5 days completed  continue decadron x 10 days  blood cultures - 1/4 with staph epidermis - contaminant   CXR - extensive bilateral airspace disease

## 2021-08-23 NOTE — CHART NOTE - NSCHARTNOTEFT_GEN_A_CORE
Nutrition follow-up note-    Pt adm from [facility] w/confusion; found to have COVID. PMHx includes Alzheimer's dementia, HLD, HTN, CAD. Pt s/p swallow evaluation (8/10) w/recommendation for pureed w/nectar-thick fluids. Per GOC pt remains full code as per family wishes. Per chart daughter is undecided/not consenting to NGT placement/artificial nutrition (given poor PO intake). Per chart pt has poor prognosis.     Factors impacting intake: [ ] none [ ] nausea  [ ] vomiting [ ] diarrhea [ ] constipation  [ ]chewing problems [x ] swallowing issues  [x ] other: AMS, lethargy, poor appetite.    Diet Prescription: Diet, Dysphagia 1 Pureed-Nectar Consistency Fluid:   Supplement Feeding Modality:  Oral  Health Shake Cans or Servings Per Day:  1       Frequency:  Two Times a day (08-15-21 @ 10:05)    Intake: poor PO intake.     Current Weight: (8/23) 57.6 kg, (8/16) 59.8 kg, (8/14) 67.1 kg ?? ----> 7.3 kg/11% loss x 2 days- likely inaccurate. (8/8 adm) 67.1 kg ??. No weights noted between 8/8 -8/14.   % Weight Change: unable to assess true weight change due to questionable accuracy of daily weights, as above.     No edema documented.  Unable to conduct nutrition focused physical exam due to pt very lethargic at time of visit, on COVID isolation, +NRB mask.    Pertinent Medications: MEDICATIONS  (STANDING):  aspirin  chewable 81 milliGRAM(s) Oral daily  atorvastatin 20 milliGRAM(s) Oral at bedtime  cloNIDine Patch 0.1 mG/24Hr(s) 1 patch Transdermal every 7 days  dexAMETHasone  Injectable 6 milliGRAM(s) IV Push daily  dextrose 5%. 1000 milliLiter(s) (100 mL/Hr) IV Continuous <Continuous>  dextrose 5%. 500 milliLiter(s) (75 mL/Hr) IV Continuous <Continuous>  enoxaparin Injectable 40 milliGRAM(s) SubCutaneous every 24 hours  ethacrynic acid 25 milliGRAM(s) Oral daily  metoprolol succinate ER 25 milliGRAM(s) Oral daily    MEDICATIONS  (PRN):  acetaminophen   Tablet .. 650 milliGRAM(s) Oral every 6 hours PRN Temp greater or equal to 38C (100.4F), Mild Pain (1 - 3), Moderate Pain (4 - 6), Severe Pain (7 - 10)  ALBUTerol    90 MICROgram(s) HFA Inhaler 2 Puff(s) Inhalation every 6 hours PRN Shortness of Breath and/or Wheezing  guaiFENesin Oral Liquid (Sugar-Free) 200 milliGRAM(s) Oral every 6 hours PRN Cough    Pertinent Labs: 08-23 Na152 mmol/L<H> Glu 171 mg/dL<H> K+ 4.5 mmol/L Cr  1.39 mg/dL<H> BUN 49 mg/dL<H> 08-23 Alb 1.5 g/dL<L>      Skin: no pressure ulcers.    Estimated Needs:   [x ] no change since previous assessment (8/15)  [ ] recalculated:     Previous Nutrition Diagnosis:   Nutrition Diagnostic Terminology #1 Inadequate Oral Intake.     Etiology decreased ability to consume sufficient energy.     Signs/Symptoms confused & lethargic ; Dementia.     Goal/Expected Outcome Pt will Meet calorie and protein needs > 50% via meals / supplements (GOAL not being met).    Nutrition Diagnosis is [x ] ongoing  [ ] resolved [ ] not applicable     New Nutrition Diagnosis: [x ] not applicable      Interventions:   Recommend:  [x] Continue current diet as Rx'd.  [ ] Change Diet To:  [x ] Nutrition Supplement: Continue current PO supplement as Rx'd.  [ ] Nutrition Support  [ ] Other:     Monitoring and Evaluation:   [x ] PO intake [ x ] Tolerance to diet prescription [ x ] weights [ x ] labs[ x ] follow up per protocol  [ ] other:
PPM interrogation completed ([facility] [facility]).  [employer] rep discussed findings with Dr. Horta.  Full report in chart

## 2021-08-23 NOTE — PROGRESS NOTE ADULT - SUBJECTIVE AND OBJECTIVE BOX
LEANDRO YAÑEZ  MRN-[id number]    Follow Up:  COVID 19    Interval History: The pt was seen and examined, no distress, remains on NRB, not interactive, does not follow commands.     PAST MEDICAL & SURGICAL HISTORY:  CAD (coronary artery disease)  s/p CABG, PPM    HTN (hypertension)    HLD (hyperlipidemia)    Alzheimer&#x27;s dementia with behavioral disturbance, unspecified timing of dementia onset    Artificial pacemaker        ROS:    [x ] Unobtainable because: dementia, lethargic   [ ] All other systems negative    Constitutional: no fever, no chills  Head: no trauma  Eyes: no vision changes, no eye pain  ENT:  no sore throat, no rhinorrhea  Cardiovascular:  no chest pain, no palpitation  Respiratory:  no SOB, no cough  GI:  no abd pain, no vomiting, no diarrhea  urinary: no dysuria, no hematuria, no flank pain  musculoskeletal:  no joint pain, no joint swelling  skin:  no rash  neurology:  no headache, no seizure, no change in mental status  psych: no anxiety, no depression         Allergies  sulfa drugs (Unknown)        ANTIMICROBIALS:      OTHER MEDS:  acetaminophen   Tablet .. 650 milliGRAM(s) Oral every 6 hours PRN  ALBUTerol    90 MICROgram(s) HFA Inhaler 2 Puff(s) Inhalation every 6 hours PRN  aspirin  chewable 81 milliGRAM(s) Oral daily  atorvastatin 20 milliGRAM(s) Oral at bedtime  cloNIDine Patch 0.1 mG/24Hr(s) 1 patch Transdermal every 7 days  dexAMETHasone  Injectable 6 milliGRAM(s) IV Push daily  dextrose 5%. 1000 milliLiter(s) IV Continuous <Continuous>  dextrose 5%. 500 milliLiter(s) IV Continuous <Continuous>  enoxaparin Injectable 40 milliGRAM(s) SubCutaneous every 24 hours  ethacrynic acid 25 milliGRAM(s) Oral daily  guaiFENesin Oral Liquid (Sugar-Free) 200 milliGRAM(s) Oral every 6 hours PRN  metoprolol succinate ER 25 milliGRAM(s) Oral daily      Vital Signs Last 24 Hrs  T(C): 36.3 (23 Aug 2021 16:00), Max: 37 (23 Aug 2021 04:58)  T(F): 97.3 (23 Aug 2021 16:00), Max: 98.6 (23 Aug 2021 04:58)  HR: 76 (23 Aug 2021 16:00) (63 - 76)  BP: 158/68 (23 Aug 2021 16:00) (154/62 - 162/60)  BP(mean): --  RR: 18 (23 Aug 2021 16:00) (18 - 20)  SpO2: 94% (23 Aug 2021 16:00) (94% - 100%)    Physical Exam:  General:  on NRB, lethargic   HEENT: AT/NC. Dry mouth   Neck: Not rigid. No sense of mass.  Nodes: None palpable.  Lungs: bilateral rhonchi, no wheezes today  Heart: Regular rate and rhythm. No Murmur. No rub. No gallop. No palpable thrill.  Abdomen: Bowel sounds present and normoactive. Soft. Nondistended. Nontender. No sense of mass. No organomegaly.  Extremities: No cyanosis or clubbing. mild edema of bilateral hands  Skin: Warm. Dry. fragile. No rash. No vasculitic stigmata.  Psychiatric: unable    WBC Count: 11.20 K/uL (08-23 @ 06:15)  WBC Count: 12.57 K/uL (08-22 @ 17:24)  WBC Count: 13.21 K/uL (08-21 @ 07:07)  WBC Count: 5.46 K/uL (08-17 @ 06:23)                            10.2   11.20 )-----------( 379      ( 23 Aug 2021 06:15 )             32.7       08-23    152<H>  |  119<H>  |  49<H>  ----------------------------<  171<H>  4.5   |  29  |  1.39<H>    Ca    8.5      23 Aug 2021 06:15    TPro  6.0  /  Alb  1.5<L>  /  TBili  0.5  /  DBili  .18  /  AST  12<L>  /  ALT  29  /  AlkPhos  117  08-23          Creatinine Trend: 1.39<--, 1.34<--, 1.54<--, 1.62<--, 1.44<--, 1.20<--      MICROBIOLOGY:  v  .Blood Blood-Peripheral  08-16-21   No Growth Final  --  Blood Culture PCR      .Blood Blood-Peripheral  08-08-21   No Growth Final  --  --      Clean Catch Clean Catch (Midstream)  08-08-21   >100,000 CFU/ml Escherichia coli  --  Escherichia coli          Rapid RVP Result: Detected (08-17 @ 02:59)        C-Reactive Protein, Serum: 122 (08-21)    Ferritin, Serum: 252 (08-21)      D-Dimer Assay, Quantitative: 477 (08-21)  D-Dimer Assay, Quantitative: 431 (08-19)    Procalcitonin, Serum: 0.16 (08-17-21 @ 04:53)    SARS-CoV-2: Detected (17 Aug 2021 02:59)  SARS-CoV-2: NotDetec (27 Apr 2021 14:22)    RADIOLOGY:

## 2021-08-24 NOTE — SWALLOW BEDSIDE ASSESSMENT ADULT - SLP GENERAL OBSERVATIONS
pt seen bedside, lethargic and non verbal essentially non communicative except facial grimace and brief eye opening given oral care. pt minimally responsive to tactile/auditory stimuli and she did not follow direction/models. pt on NRB 02- and noted saturation from 100 to 85 during oral care. pt not safe for po trials/diet at this time.
pt seen bedside during lunch fairly alert and oriented to self when SLP stated name. pt essentially nonverbal except 3-4 single word/short utterances ie 'I feel better" or yes, otherwise she was noncommunicative. pt did not follow directions and noted overall weakness/WOB.

## 2021-08-24 NOTE — PROGRESS NOTE ADULT - SUBJECTIVE AND OBJECTIVE BOX
LEANDRO YAÑEZ  MRN-[id number]    Follow Up:  COVID 19    Interval History: The pt was seen and examined earlier, no distress, remains not interactive, on NRB. The pt is afebrile, no new lab work. As per RN, attempted to wean off supplemental O2, O2 SAT dropped below 90%, not recorded, placed back on NRB.     PAST MEDICAL & SURGICAL HISTORY:  CAD (coronary artery disease)  s/p CABG, PPM    HTN (hypertension)    HLD (hyperlipidemia)    Alzheimer&#x27;s dementia with behavioral disturbance, unspecified timing of dementia onset    Artificial pacemaker        ROS:    [x ] Unobtainable because: pt is not interactive   [ ] All other systems negative    Constitutional: no fever, no chills  Head: no trauma  Eyes: no vision changes, no eye pain  ENT:  no sore throat, no rhinorrhea  Cardiovascular:  no chest pain, no palpitation  Respiratory:  no SOB, no cough  GI:  no abd pain, no vomiting, no diarrhea  urinary: no dysuria, no hematuria, no flank pain  musculoskeletal:  no joint pain, no joint swelling  skin:  no rash  neurology:  no headache, no seizure, no change in mental status  psych: no anxiety, no depression         Allergies  sulfa drugs (Unknown)        ANTIMICROBIALS:      OTHER MEDS:  acetaminophen   Tablet .. 650 milliGRAM(s) Oral every 6 hours PRN  ALBUTerol    90 MICROgram(s) HFA Inhaler 2 Puff(s) Inhalation every 6 hours PRN  aspirin  chewable 81 milliGRAM(s) Oral daily  cloNIDine Patch 0.1 mG/24Hr(s) 1 patch Transdermal every 7 days  dexAMETHasone  Injectable 6 milliGRAM(s) IV Push daily  ethacrynic acid 25 milliGRAM(s) Oral daily  guaiFENesin Oral Liquid (Sugar-Free) 200 milliGRAM(s) Oral every 6 hours PRN  metoprolol succinate ER 25 milliGRAM(s) Oral daily      Vital Signs Last 24 Hrs  T(C): 36.3 (24 Aug 2021 10:34), Max: 36.4 (23 Aug 2021 23:42)  T(F): 97.4 (24 Aug 2021 10:34), Max: 97.5 (23 Aug 2021 23:42)  HR: 70 (24 Aug 2021 10:34) (67 - 86)  BP: 160/75 (24 Aug 2021 10:34) (153/58 - 167/63)  BP(mean): --  RR: 18 (24 Aug 2021 10:34) (18 - 18)  SpO2: 98% (24 Aug 2021 10:34) (94% - 98%)    Physical Exam:  General:  on NRB, lethargic   HEENT: AT/NC. Dry mouth   Neck: Not rigid. No sense of mass.  Nodes: None palpable.  Lungs: bilateral rhonchi, no wheezes today  Heart: Regular rate and rhythm. No Murmur. No rub. No gallop. No palpable thrill.  Abdomen: Bowel sounds present and normoactive. Soft. Nondistended. Nontender. No sense of mass. No organomegaly.  Extremities: No cyanosis or clubbing. mild edema of bilateral hands  Skin: Warm. Dry. fragile. No rash. No vasculitic stigmata.  Psychiatric: unable    WBC Count: 11.20 K/uL (08-23 @ 06:15)  WBC Count: 12.57 K/uL (08-22 @ 17:24)  WBC Count: 13.21 K/uL (08-21 @ 07:07)                            10.2   11.20 )-----------( 379      ( 23 Aug 2021 06:15 )             32.7       08-23    152<H>  |  119<H>  |  49<H>  ----------------------------<  171<H>  4.5   |  29  |  1.39<H>    Ca    8.5      23 Aug 2021 06:15    TPro  6.0  /  Alb  1.5<L>  /  TBili  0.5  /  DBili  .18  /  AST  12<L>  /  ALT  29  /  AlkPhos  117  08-23          Creatinine Trend: 1.39<--, 1.34<--, 1.54<--, 1.62<--, 1.44<--, 1.20<--      MICROBIOLOGY:  v  .Blood Blood-Peripheral  08-16-21   No Growth Final  --  Blood Culture PCR      .Blood Blood-Peripheral  08-08-21   No Growth Final  --  --      Clean Catch Clean Catch (Midstream)  08-08-21   >100,000 CFU/ml Escherichia coli  --  Escherichia coli    C-Reactive Protein, Serum: 122 (08-21)    Ferritin, Serum: 252 (08-21)      D-Dimer Assay, Quantitative: 477 (08-21)  D-Dimer Assay, Quantitative: 431 (08-19)      SARS-CoV-2: Detected (17 Aug 2021 02:59)  SARS-CoV-2: NotDetec (27 Apr 2021 14:22)    RADIOLOGY:

## 2021-08-24 NOTE — SWALLOW BEDSIDE ASSESSMENT ADULT - COMMENTS
CXR 8/19/2021INTERPRETATION:  Clinical history: PneumoniaPortable chest  Extensive bilateral airspace disease. Overall no significant change since August 16.
CXR 8/7/2021INTERPRETATION:  Clinical Information: SepsisTechnique: AP chest x-ray(s).Comparison: 04/19/2021  Findings/Impression: Status post CABG and left pacemaker. Lungs are clear.    CT head no contrast 8/7/2021  FINDINGS:The ventricles and sulci are prominent, compatible with age-related generalized cerebral volume loss.   There is no CT evidence for acute cerebral cortical infarct. There is no evidence of hemorrhage. There is periventricular and subcortical white matter hypoattenuation,  most compatible with chronic microvascular ischemic changes.   No mass effect is found in the brain.  There is no midline shift or herniation pattern.The visualized portions of the paranasal sinuses and mastoid air cells are clear.  IMPRESSION:No evidence of acute intracranial abnormality.  No evidence of hemorrhage.    Chronic changes as above.

## 2021-08-24 NOTE — SWALLOW BEDSIDE ASSESSMENT ADULT - SWALLOW EVAL: RECOMMENDED FEEDING/EATING TECHNIQUES
oral hygiene
allow for swallow between intakes/crush medication (when feasible)/maintain upright posture during/after eating for 30 mins/oral hygiene/small sips/bites

## 2021-08-24 NOTE — PROGRESS NOTE ADULT - SUBJECTIVE AND OBJECTIVE BOX
Patient: LEANDRO YAÑEZ [id number] 89y Female                            Hospitalist Attending Note    Remains on NRB, SaO2 ~ 88-96%.  Unable to offer complaints.  Still with poor po intake.    ____________________PHYSICAL EXAM:  GENERAL:  NAD, opens eyes to voice, confused.    HEENT: NCAT  CARDIOVASCULAR:  S1, S2  LUNGS: coarse BS b/l  ABDOMEN:  soft, (-) tenderness, (-) distension, (+) bowel sounds, (-) guarding, (-) rebound (-) rigidity  EXTREMITIES:  no cyanosis / clubbing / edema.   ____________________    VITALS:  Vital Signs Last 24 Hrs  T(C): 36.3 (24 Aug 2021 10:34), Max: 36.4 (23 Aug 2021 23:42)  T(F): 97.4 (24 Aug 2021 10:34), Max: 97.5 (23 Aug 2021 23:42)  HR: 70 (24 Aug 2021 10:34) (67 - 86)  BP: 160/75 (24 Aug 2021 10:34) (153/58 - 167/63)  BP(mean): --  RR: 18 (24 Aug 2021 10:34) (18 - 18)  SpO2: 98% (24 Aug 2021 10:34) (94% - 98%) Daily     Daily Weight in k.5 (24 Aug 2021 05:30)  CAPILLARY BLOOD GLUCOSE        I&O's Summary    23 Aug 2021 07:01  -  24 Aug 2021 07:00  --------------------------------------------------------  IN: 2400 mL / OUT: 400 mL / NET: 2000 mL        LABS:                        10.2   11.20 )-----------( 379      ( 23 Aug 2021 06:15 )             32.7     08-23    152<H>  |  119<H>  |  49<H>  ----------------------------<  171<H>  4.5   |  29  |  1.39<H>    Ca    8.5      23 Aug 2021 06:15    TPro  6.0  /  Alb  1.5<L>  /  TBili  0.5  /  DBili  .18  /  AST  12<L>  /  ALT  29  /  AlkPhos  117  08-    PT/INR - ( 23 Aug 2021 06:15 )   PT: 14.3 sec;   INR: 1.25 ratio           LIVER FUNCTIONS - ( 23 Aug 2021 06:16 )  Alb: 1.5 g/dL / Pro: 6.0 gm/dL / ALK PHOS: 117 U/L / ALT: 29 U/L / AST: 12 U/L / GGT: x                     MEDICATIONS:  acetaminophen   Tablet .. 650 milliGRAM(s) Oral every 6 hours PRN  ALBUTerol    90 MICROgram(s) HFA Inhaler 2 Puff(s) Inhalation every 6 hours PRN  aspirin  chewable 81 milliGRAM(s) Oral daily  atorvastatin 20 milliGRAM(s) Oral at bedtime  cloNIDine Patch 0.1 mG/24Hr(s) 1 patch Transdermal every 7 days  dexAMETHasone  Injectable 6 milliGRAM(s) IV Push daily  dextrose 5%. 1000 milliLiter(s) IV Continuous <Continuous>  dextrose 5%. 500 milliLiter(s) IV Continuous <Continuous>  enoxaparin Injectable 40 milliGRAM(s) SubCutaneous every 24 hours  ethacrynic acid 25 milliGRAM(s) Oral daily  guaiFENesin Oral Liquid (Sugar-Free) 200 milliGRAM(s) Oral every 6 hours PRN  metoprolol succinate ER 25 milliGRAM(s) Oral daily

## 2021-08-24 NOTE — SWALLOW BEDSIDE ASSESSMENT ADULT - SWALLOW EVAL: ORAL MUSCULATURE
unable to assess due to poor participation/comprehension
informal observation/generally intact/unable to assess due to poor participation/comprehension

## 2021-08-24 NOTE — SWALLOW BEDSIDE ASSESSMENT ADULT - SWALLOW EVAL: DIAGNOSIS
pt presented with lethargy, decreased cognition, overall weakness and desaturation off NRB 02. pt not safe for po trails/diet at this time at high risk for aspiration
pt presented with oropharyngeal dysphagia marked by overall weakness/WOB, slightly decreased oral grading, increased mastication time with mechanical soft, slow bolus manipulation/oral transport posterior, suspect delay in swallow trigger with reduced hyolaryngeal excursion to palpation and trace wet vocal quality after thin liquid/ cough with mechanical soft.

## 2021-08-24 NOTE — PROGRESS NOTE ADULT - NSPROGADDITIONALINFOA_GEN_ALL_CORE
d/w daughter
Discussed with Dr. Chavez
[place] guarded    Randy Salas MD  Attending Physician  [facility]  Division of Infectious Diseases  [phone]
Dr. Asa Verdin covering the service this weekend. Please call [phone] for any ID issues that need attention.     Randy Salas MD  Attending Physician  [facility]  Division of Infectious Diseases  [phone]
will sign off, re-consult as needed  discussed with Dr. Pickett  Discussed with Dr. Verdin
Discussed with Dr. Chavez  Discussed with Dr. Salas

## 2021-08-24 NOTE — SWALLOW BEDSIDE ASSESSMENT ADULT - SLP PRECAUTIONS/LIMITATIONS: VISION
pt kept her eyes closed most of the assessment
fleeting eye contact- pt kept her eyes closed most of the eval

## 2021-08-24 NOTE — PROGRESS NOTE ADULT - SUBJECTIVE AND OBJECTIVE BOX
INTERVAL HPI:  89F with CAD (post-CABG), HTN, S/P PPM and Parkinson's Disease and Dementia.   Sent in from [facility] assisted living for increasing confusion and fever.   Per H & P was  here in April 2021 for acute hypoxic respiratory failure secondary to aspiration. She required intubation and then discharged.   Got admitted with UTI was also  found to be COVID-19 positive(( no infiltrate on CXR), Fully vaccinated in February 2021 with Pfizer. Patient given 2.3 L LR bolus and Ceftriaxone for UTI and sepsis. Being followed by ID.  Hospital course complicated with increasing O2 requirement, so started on Remdeivir and Dexamethasone.  08/18/21: On NRB mask and lethargic.  Not able to provide history.    OVERNIGHT EVENTS:  SPO2 in high 90s.    Vital Signs Last 24 Hrs  T(C): 36.3 (24 Aug 2021 10:34), Max: 36.4 (23 Aug 2021 23:42)  T(F): 97.4 (24 Aug 2021 10:34), Max: 97.5 (23 Aug 2021 23:42)  HR: 70 (24 Aug 2021 10:34) (67 - 86)  BP: 160/75 (24 Aug 2021 10:34) (153/58 - 167/63)  BP(mean): --  RR: 18 (24 Aug 2021 10:34) (18 - 18)  SpO2: 98% (24 Aug 2021 10:34) (94% - 98%)    PHYSICAL EXAM:  GEN:       Lethargic, comfortable.  HEENT:    NRB mask  RESP:       no wheezing.  CVS:          Regular rate and rhythm.   ABD:         Soft, non-tender, non-distended;     MEDICATIONS  (STANDING):  aspirin  chewable 81 milliGRAM(s) Oral daily  atorvastatin 20 milliGRAM(s) Oral at bedtime  cloNIDine Patch 0.1 mG/24Hr(s) 1 patch Transdermal every 7 days  dexAMETHasone  Injectable 6 milliGRAM(s) IV Push daily  dextrose 5%. 1000 milliLiter(s) (100 mL/Hr) IV Continuous <Continuous>  dextrose 5%. 500 milliLiter(s) (75 mL/Hr) IV Continuous <Continuous>  enoxaparin Injectable 40 milliGRAM(s) SubCutaneous every 24 hours  ethacrynic acid 25 milliGRAM(s) Oral daily  metoprolol succinate ER 25 milliGRAM(s) Oral daily    MEDICATIONS  (PRN):  acetaminophen   Tablet .. 650 milliGRAM(s) Oral every 6 hours PRN Temp greater or equal to 38C (100.4F), Mild Pain (1 - 3), Moderate Pain (4 - 6), Severe Pain (7 - 10)  ALBUTerol    90 MICROgram(s) HFA Inhaler 2 Puff(s) Inhalation every 6 hours PRN Shortness of Breath and/or Wheezing  guaiFENesin Oral Liquid (Sugar-Free) 200 milliGRAM(s) Oral every 6 hours PRN Cough    LABS:                        10.2   11.20 )-----------( 379      ( 23 Aug 2021 06:15 )             32.7     08-23    152<H>  |  119<H>  |  49<H>  ----------------------------<  171<H>  4.5   |  29  |  1.39<H>    Ca    8.5      23 Aug 2021 06:15    TPro  6.0  /  Alb  1.5<L>  /  TBili  0.5  /  DBili  .18  /  AST  12<L>  /  ALT  29  /  AlkPhos  117  08-23    PT/INR - ( 23 Aug 2021 06:15 )   PT: 14.3 sec;   INR: 1.25 ratio        ASSESSMENT AND PLAN:  ·	Acute hypoxic Respiratory failure.  ·	COVID Pneumonia.  ·	UTI with E Coli.  ·	Staph Epi( Methicillin resistant bacteremia).  ·	Anemia.  ·	Renal Insuffiencey.  ·	CAD with CABG.  ·	S/P PPM.  ·	Parkinsonism  ·	Hypoalbuminemia.    SPO2 in high 90s, on NRB mask, will try on 50% Venti mask.  Completed Remdesivir, on Dexamethasone.

## 2021-08-24 NOTE — PROGRESS NOTE ADULT - ASSESSMENT
89F with PMHx of CAD, HTN, and Parkinson's Disease presenting from [facility] for increasing confusion.  Found to have COVID infection.      # Acute hypoxic Respiratory failure sec to COVID 19 Pneumonia- pt's SaO2 is improving.  CXR extensive b/l airspace disease in mid and lower lung fields.  s/p Decadron, Remedsivir.   # Dysphagia - continued poor intake.  Goals of care, artificial nutrition was extensively discussed with daughter.  She is not consenting to NGT placement.  Awaiting palliative care   # Hypernatremia - due to poor po intake.  Conservative management.   # HTN - will change to Catapres due to inability to take po.  BP generally stable.    # HLD c/w atorvastatin if able to tolerate.   # DONITA - Cr 1.7-1.8 on admission, conservative management.    # DVT ppx on lovenox SQ.     Spoke with daughter Megan ([phone]) and discussed plan of care in detail on 8/19, 8/20, 8/22, 8/23.   Pt now on comfort care. Daughter acknowledges that pt would not want "to live this way".  DNR / DNI / declines NGT placement.  Hospice evaluation.      89F with PMHx of CAD, HTN, and Parkinson's Disease presenting from [facility] for increasing confusion.  Found to have COVID infection.      # Acute hypoxic Respiratory failure sec to COVID 19 Pneumonia- pt's SaO2 is improving.  CXR extensive b/l airspace disease in mid and lower lung fields.  s/p Decadron, Remedsivir.   # Dysphagia - continued poor intake.  Goals of care, artificial nutrition was extensively discussed with daughter.  She is not consenting to NGT placement.  Awaiting palliative care   # Hypernatremia - due to poor po intake.  Conservative management.   # HTN - will change to Catapres due to inability to take po.  BP generally stable.    # HLD c/w atorvastatin if able to tolerate.   # DONITA - Cr 1.7-1.8 on admission, conservative management.    # DVT d/c Lovenox as pt comfort care.     Spoke with daughter Megan ([phone]) and discussed plan of care again today.  She confirms comfort care wishes.  No further lab draws.  Daughter acknowledges that pt would not want "to live this way".  DNR / DNI / declines NGT placement.  Hospice evaluation.

## 2021-08-24 NOTE — SWALLOW BEDSIDE ASSESSMENT ADULT - SLP PERTINENT HISTORY OF CURRENT PROBLEM
pt known to this dept from previous clinical swallow on 8/10, at which time puree with nectar thick liquid was recommended. see report for details. in documentation review pt had outpt MBS 6/24/2021 (?[facility]) at which time mechanical soft with thin liquid was recommended.
in documentation review pt had outpt MBS 6/24/2021 (?[facility]) at which time mechanical soft with thin liquid was recommended.

## 2021-08-24 NOTE — PROGRESS NOTE ADULT - ASSESSMENT
Patient with fever.   If fever not from COVID, then in theory would be a candidate for monoclonal antibody. However it is possible that this represents a breakthrough infection in a frail elderly individual. Patients requiring inpatient care/hospitalization for COVID did not benefit from MCAB infusion. With CXR showing possible new RLL infiltrate patient could certainly could have fever from COVID. Fortunately individuals vaccinated have a markedly reduced mortality rate compared with unvaccinated individuals. Patient is not a candidate for RDV or Dex at this time.    Patient could have aspiration pneumonia, though relatively low suspicion. She has pyuria and + urine cx, but also epithelial cells on U/A indicating a degree of external contamination. History here is inadequate to discern between and the presence/absence of urinary symptoms.     8/11: Urine isolate S zosyn, at risk for aspiration with new focal R sided infiltrate. +COvID but if RLL infiltrate  from covid rather than aspiration would not be a candidate for MCAB. Not hypoxic to indicate need for RDV/Dex.   8/13: Isolated low grade temperature, otherwise appears stable. Comfortable on RA without any concern for developing pneumonia on the basis of exam (though effort suboptimal).   8/16: spiking fevers, on NC, no leukocytosis, Cr decreased 1.39, pt is not interactive, blood cultures with no growth, UC with E. Coli, tested positive for COVID 19 on 8/7, unknown for how long the pt had symptoms. The pt was given a dose of IV Vancomycin and a dose of Amikacin for possible acute infection, UTI or aspiration are in differential. Sepsis protocol was activated, Urine and blood cultures collected, CXR performed. The pt was started on remdesivir and Decadron for COVID 19. Vancomycin po taper continued.   Attending Addendum--8/16  Case reviewed with NP Dionna Waldron. Her note reviewed and modified as appropriate.   Patient personally assessed and examined.   Seen by me earlier today before NP.  Developed hypoxia Friday evening (my team was not informed), placed on O2, continued to have escalating FiO2 requirements now up to 6L NC. Mental status poorer than prior visits- less responsive.. RN states patient has not been eating well, whereas before she exhibited a decent appetite. Febrile to >101F this am, no interventions at that time (again my team not made aware). I ordered CXR prior to my assessment, not read at the time of evaluation but to my eye obvious B infiltrates compared to the last film on 8/9. Patient with hypertension, and has a cardiac history but does not clinically appear to be volume overloaded, no pleural effusions on my review of CXR to suggest CHF, and she is also hypernatremic which would suggest dehydration not volume overload. Infiltrates make PE unlikely. COVID in DDx. Recurrent aspiration in DDx though bilateral disease makes this less likely.   Ordered sepsis bundle, single doses of IV vancomycin and amikacin- cognizant of renal function, but especially in setting of C. difficile and likely COVID-related pneumonia I do not think the R:B of broad spectrum antibiotics is favorable. These drugs should have minimal impact on gut microbiota.   Started RDV, cognizant of borderline GFR, and also started steroids.  [place] guarded  Discussed with ge RN, nursing management.    8/17: no fevers today, no leukocytosis, on NRB now, Cr normalized, GFR improved to 44, LFTs ok. Blood cultures are pending, remdesivir and decadron continued - started yesterday. Yesterday's chest xray not read, + worsening infiltrates bilaterally - personally reviewed.   8/18: remains afebrile, no WBC, Cr ok, AST elevated, ALT ok, CXR with Extensive new bilateral airspace disease in the mid and lower lung fields, lung exam with rhonchi bilaterally, blood cultures 1/4 with staph epidermis - most likely contaminant, will not repeat blood cultures at this time. Procalcitonin 0.16, no role for antibiotics for now, remdesivir and decadron continued.   Attending Addendum--  Case reviewed with ARMINDA Waldron. Her note reviewed and modified as appropriate.   Patient personally assessed and examined.   Seen earlier today.  Remains on nonrebreather facemask.  Poorly responsive.  Suspect presentation this point in time solely relates to Covid.  Paradox as a consequence is guarded in this frail elderly demented woman.  8/19: the pt remains afebrile, no new CBC, on NRB, seen by pulmonology. Cr and LFTs ok, remdesivir and Decadron continued.   Attending Addendum--  Case reviewed with ARMINDA Waldron. Her note reviewed and modified as appropriate.   Patient personally assessed and examined.   Remains poorly responsive on a nonrebreather facemask.  I do not find support for superimposed bacterial infection at this point in time.  Unfortunately suspect that this is all Covid that we are looking at this point in time.  Positive blood culture represents procurement contamination.  Paradox is poor.  8/20: on NRB, no fevers, no new lab work, Cr elevated, today is the day #5 of remdesivir and decadron. Discussed with Dr. Pickett - attempts to wean off O2 will be made.   Attending Addendum--  Case reviewed with NP Dionna Waldron. Her note reviewed and modified as appropriate.   Patient personally assessed and examined.   Patient remains quite ill, with hypoxemia related to COVID-19 pneumonia in the setting of underlying dementia, CAD, PPM. Increased creatinine today- has been in same range previously but need to monitor for DONITA. Will need to attempt to decrease O2, unclear how aggressive attempts have been to date. Still remains on steroids. Not a candidate for tocilizumab. Single positive blood culture remains consistent with contamination at this point in time.   Prognosis remains most poor  8/23: remains on NRB, no fevers, WBC decreased 11.2, LFTs ok, Cr decreased 1.39. The pt is s/p course of remdesivir, on Decadron day #8.   8/24: no change in pt's presentation, attempts to wean off supplemental O2 unsuccessful - as per RN, no fevers, no new lab work, course of remdesivir complete, today is the day #9 of Decadron

## 2021-08-24 NOTE — SWALLOW BEDSIDE ASSESSMENT ADULT - H & P REVIEW
89 yo F w/PMH of CAD, HTN, and HLD presents to the ED from [facility] for altered mental status. Pt is limited by dementia. 89F with PMHx of CAD (post-CABG), HTN, and Parkinson's Disease sent in from [facility] assisted living for increasing confusion. History is limited as patient has baseline dementia and may be acutely altered. Unable to reach [facility] and daughter not able to offer further information. It seems patient recently here in April 2021 for acute hypoxic respiratory failure secondary to aspiration. She required intubation and then discharged. Today patient is here with fever, but otherwise normal vitals. Patient possibly with UTI. She was incidentally found to be COVID-19 positive, but was fully vaccinated in February 2021 with Pfizer/yes
89 yo F w/PMH of CAD, HTN, and HLD presents to the ED from [facility] for altered mental status. Pt is limited by dementia. Spoke to Pt's daughter on the phone, daughter had no information. Spoke to [facility], had no response. It seems patient recently here in April 2021 for acute hypoxic respiratory failure secondary to aspiration. She required intubation and then discharged. Today patient is here with fever, but otherwise normal vitals. Patient possibly with UTI. She was incidentally found to be COVID-19 positive, but was fully vaccinated in February 2021 with Pfizer./yes

## 2021-08-25 NOTE — CONSULT NOTE ADULT - SUBJECTIVE AND OBJECTIVE BOX
89 year old female, long term (>20 years) patient of mine re: BP, CAD;  admitted from assisted living with confusion.  Found to have COVID19; developed hypoxic respiratory failure.  PPM checked earlier in stay and functioning normally.  No h/o ACS, heart failure or other cv issues.    PAST MEDICAL & SURGICAL HISTORY:  CAD (coronary artery disease)  s/p CABG, PPM    HTN (hypertension)    HLD (hyperlipidemia)    Alzheimer&#x27;s dementia with behavioral disturbance, unspecified timing of dementia onset    Artificial pacemaker      FAMILY HISTORY:  No pertinent family history in first degree relatives      Allergies    sulfa drugs (Unknown)    Intolerances      Home Medications:  aspirin 81 mg oral tablet: 1 tab(s) orally once a day (07 Aug 2021 20:58)  atorvastatin 20 mg oral tablet: 1 tab(s) orally once a day (at bedtime) (07 Aug 2021 20:58)  Firvanq 50 mg/mL oral liquid: 2.5 milliliter(s) orally 3 times a week (07 Aug 2021 20:58)  Metoprolol Succinate ER 25 mg oral tablet, extended release: 1 tab(s) orally once a day (07 Aug 2021 20:58)  Norvasc 2.5 mg oral tablet: 1 tab(s) orally once a day (07 Aug 2021 20:58)       Hospital Medications:  aspirin  chewable 81 milliGRAM(s) Oral daily  cloNIDine Patch 0.1 mG/24Hr(s) 1 patch Transdermal every 7 days  metoprolol succinate ER 25 milliGRAM(s) Oral daily  morphine  - Injectable 0.5 milliGRAM(s) IV Push every 6 hours    MEDICATIONS  (PRN):  acetaminophen   Tablet .. 650 milliGRAM(s) Oral every 6 hours PRN Temp greater or equal to 38C (100.4F), Mild Pain (1 - 3), Moderate Pain (4 - 6), Severe Pain (7 - 10)  ALBUTerol    90 MICROgram(s) HFA Inhaler 2 Puff(s) Inhalation every 6 hours PRN Shortness of Breath and/or Wheezing  guaiFENesin Oral Liquid (Sugar-Free) 200 milliGRAM(s) Oral every 6 hours PRN Cough  morphine  - Injectable 1 milliGRAM(s) IV Push every 8 hours PRN dsypnea    Exam  VS per EHR  skin warm, dry  jvp nl  Lungs no wheeze, good bs bilaterally  Heart S1S2, no S3, rub  Abd soft nontender bs nl, nondistended  Extremities:   tr edema  sluggish         Hepatic Function Panel   08.23.21 @ 06:16   Indirect Reacting Bilirubin mg/dL   0.3   Protein Total, Serum gm/dL   6.0   Albumin, Serum g/dL   1.5   Bilirubin Total, Serum mg/dL   0.5   Bilirubin Direct, Serum mg/dL   .18   Alkaline Phosphatase, Serum U/L   117   Aspartate Aminotransferase (AST/SGOT) U/L   12   Alanine Aminotransferase (ALT/SGPT) U/L   29   Prothrombin Time and INR, Plasma   08.23.21 @ 06:15   Prothrombin Time, Plasma sec   14.3   INR ratio   1.25: Recommended ranges for therapeutic INR: 2.0-3.0 for most medical and surgical thromboembolic states 2.0-3.0 for atrial fibrillation 2.0-3.0 for bileaflet mechanical valve in aortic position 2.5-3.5 for mechanical heart valves Chest 2004;126:i759-040 The presence of direct thrombin inhibitors (argatroban, refludan) may falsely increase results.   Complete Blood Count in AM   08.23.21 @ 06:15   Nucleated RBC /100 WBCs   0   WBC Count K/uL   11.20   RBC Count M/uL   3.60   Hemoglobin g/dL   10.2   Hematocrit %   32.7   Mean Cell Volume fl   90.8   Mean Cell Hemoglobin pg   28.3   Mean Cell Hemoglobin Conc gm/dL   31.2   Red Cell Distrib Width %   16.0   Platelet Count - Automated K/uL   379   Basic Metabolic Panel in AM   08.23.21 @ 06:15   Sodium, Serum mmol/L   152   Potassium, Serum mmol/L   4.5   Chloride, Serum mmol/L   119   Carbon Dioxide, Serum mmol/L   29   Anion Gap, Serum mmol/L   4   Blood Urea Nitrogen, Serum mg/dL   49   Creatinine, Serum mg/dL   1.39        Assessment    COVID19  multiple organ system failure  prognosis dismal  issues d/w daughter  no remediable cv issues evident despite elevated BNP  agree with palliative/comfort care/hospice option at present

## 2021-08-25 NOTE — CONSULT NOTE ADULT - SUBJECTIVE AND OBJECTIVE BOX
HPI:  89F with PMHx of CAD (post-CABG), HTN, and Parkinson's Disease sent in from [facility] assisted living for increasing confusion. History is limited as patient has baseline dementia and may be acutely altered. Unable to reach [facility] and daughter not able to offer further information. It seems patient recently here in April 2021 for acute hypoxic respiratory failure secondary to aspiration. She required intubation and then discharged. Today patient is here with fever, but otherwise normal vitals. Patient possibly with UTI. She was incidentally found to be COVID-19 positive, but was fully vaccinated in February 2021 with Pfizer. Patient given 2.3 L LR bolus and Ceftriaxone. (07 Aug 2021 20:20)    PERTINENT PM/SXH:   CAD (coronary artery disease)    HTN (hypertension)    HLD (hyperlipidemia)    Alzheimer&#x27;s dementia with behavioral disturbance, unspecified timing of dementia onset      Artificial pacemaker      FAMILY HISTORY:  No pertinent family history in first degree relatives      ITEMS NOT CHECKED ARE NOT PRESENT    SOCIAL HISTORY:   Significant other/partner[ ]  Children[ x]  Taoist/Spirituality:  Episcopalian  Substance hx:  [ ]   Tobacco hx:  [ ]   Alcohol hx: [ ]   Home Opioid hx:  [ ] I-Stop Reference No:  Living Situation: [ ]Home  [ ]Long term care  [ ]Rehab [x ]Other- assisted living     ADVANCE DIRECTIVES:    DNR  MOLST  [ ]  Living Will  [ ]   DECISION MAKER(s):  [ ] Health Care Proxy(s)  [ ] Surrogate(s)  [ ] Guardian           Name(s): Phone Number(s):    BASELINE (I)ADL(s) (prior to admission):  [place]: [ ]Total  [ ] Moderate [ ]Dependent    Allergies    sulfa drugs (Unknown)    Intolerances    MEDICATIONS  (STANDING):  aspirin  chewable 81 milliGRAM(s) Oral daily  cloNIDine Patch 0.1 mG/24Hr(s) 1 patch Transdermal every 7 days  dexAMETHasone  Injectable 6 milliGRAM(s) IV Push daily  metoprolol succinate ER 25 milliGRAM(s) Oral daily  morphine  - Injectable 0.5 milliGRAM(s) IV Push every 6 hours    MEDICATIONS  (PRN):  acetaminophen   Tablet .. 650 milliGRAM(s) Oral every 6 hours PRN Temp greater or equal to 38C (100.4F), Mild Pain (1 - 3), Moderate Pain (4 - 6), Severe Pain (7 - 10)  ALBUTerol    90 MICROgram(s) HFA Inhaler 2 Puff(s) Inhalation every 6 hours PRN Shortness of Breath and/or Wheezing  guaiFENesin Oral Liquid (Sugar-Free) 200 milliGRAM(s) Oral every 6 hours PRN Cough  morphine  - Injectable 1 milliGRAM(s) IV Push every 8 hours PRN dsypnea    PRESENT SYMPTOMS: [ ]Unable to obtain due to poor mentation   Source if other than patient:  [ ]Family   [ ]Team     Pain: [ ]yes [ ]no  QOL impact -   Location -                    Aggravating factors -  Quality -  Radiation -  Timing-  Severity (0-10 scale):  Minimal acceptable level (0-10 scale):     CPOT:    https://www.[facility].org/getattachment/[email or web address]/Critical-Care-Pain-Observation-Tool-(CPOT)      PAIN AD Score:     http://geriatrictoolkit.[facility]/cog/painad.pdf (press ctrl +  left click to view)    Dyspnea:                           [ ]Mild [ ]Moderate [ ]Severe  Anxiety:                             [ ]Mild [ ]Moderate [ ]Severe  Fatigue:                             [ ]Mild [ ]Moderate [ ]Severe  Nausea:                             [ ]Mild [ ]Moderate [ ]Severe  Loss of appetite:              [ ]Mild [ ]Moderate [ ]Severe  Constipation:                    [ ]Mild [ ]Moderate [ ]Severe    Other Symptoms:  [ ]All other review of systems negative     Palliative Performance Status Version 2:         10%    http://[facility].org/files/news/palliative_performance_scale_ppsv2.pdf  PHYSICAL EXAM:  Vital Signs Last 24 Hrs  T(C): 36.6 (25 Aug 2021 05:27), Max: 36.7 (24 Aug 2021 23:30)  T(F): 97.8 (25 Aug 2021 05:27), Max: 98 (24 Aug 2021 23:30)  HR: 95 (25 Aug 2021 05:27) (78 - 95)  BP: 135/72 (25 Aug 2021 05:27) (135/72 - 156/54)  BP(mean): --  RR: 18 (25 Aug 2021 05:27) (18 - 18)  SpO2: 98% (25 Aug 2021 05:27) (98% - 99%) I&O's Summary    24 Aug 2021 07:01  -  25 Aug 2021 07:00  --------------------------------------------------------  IN: 0 mL / OUT: 600 mL / NET: -600 mL      GENERAL:  [ ]Alert  [ ]Oriented x   [ ]Lethargic  [ ]Cachexia  [ ]Unarousable  [ ]Verbal  [ ]Non-Verbal  Behavioral:   [ ] Anxiety  [ ] Delirium [ ] Agitation [ ] Other  HEENT:  [ ]Normal   [ ]Dry mouth   [ ]ET Tube/Trach  [ ]Oral lesions  PULMONARY:   [ ]Clear [ ]Tachypnea  [ ]Audible excessive secretions   [ ]Rhonchi        [ ]Right [ ]Left [ ]Bilateral  [ ]Crackles        [ ]Right [ ]Left [ ]Bilateral  [ ]Wheezing     [ ]Right [ ]Left [ ]Bilateral  [ ]Diminished breath sounds [ ]right [ ]left [ ]bilateral  CARDIOVASCULAR:    [ ]Regular [ ]Irregular [ ]Tachy  [ ][place] [ ]Murmur [ ]Other  GASTROINTESTINAL:  [ ]Soft  [ ]Distended   [ ]+BS  [ ]Non tender [ ]Tender  [ ]PEG [ ]OGT/ NGT  Last BM:   GENITOURINARY:  [ ]Normal [ ] Incontinent   [ ]Oliguria/Anuria   [ ]Maddox  MUSCULOSKELETAL:   [ ]Normal   [ ]Weakness  [ ]Bed/Wheelchair bound [ ]Edema  NEUROLOGIC:   [ ]No focal deficits  [ ]Cognitive impairment  [ ]Dysphagia [ ]Dysarthria [ ]Paresis [ ]Other   SKIN:   [ ]Normal    [ ]Rash  [ ]Pressure ulcer(s)       Present on admission [ ]y [ ]n    CRITICAL CARE:  [ ] Shock Present  [ ]Septic [ ]Cardiogenic [ ]Neurologic [ ]Hypovolemic  [ ]  Vasopressors [ ]  Inotropes   [ ]Respiratory failure present [ ]Mechanical ventilation [ ]Non-invasive ventilatory support [ ]High flow    [ ]Acute  [ ]Chronic [ ]Hypoxic  [ ]Hypercarbic [ ]Other  [ ]Other organ failure     LABS:            RADIOLOGY & ADDITIONAL STUDIES:  < from: Xray Chest 1 View- PORTABLE-Routine (Xray Chest 1 View- PORTABLE-Routine in AM.) (08.19.21 @ 07:47) >    EXAM:  XR CHEST PORTABLE ROUTINE 1V                            PROCEDURE DATE:  08/19/2021          INTERPRETATION:  Clinical history: Pneumonia    Portable chest    Extensive bilateral airspace disease. Overall no significant change since August 16.    IMPRESSION: No significant change    --- End of Report ---            JASON GALINDO MD; Attending Radiologist  This document has been electronically signed. Aug 20 2021  4:42PM    < end of copied text >  < from: Xray Chest 1 View- PORTABLE-Urgent (Xray Chest 1 View- PORTABLE-Urgent .) (08.16.21 @ 09:52) >  EXAM:  XR CHEST PORTABLE URGENT 1V                            PROCEDURE DATE:  08/16/2021          INTERPRETATION:  Fever hypoxia.    AP chest. Prior 8/9/2021.    IMPRESSION:  Left ICD reidentified. No change heart mediastinum. Low lung volumes. Extensive new bilateral airspace disease in the mid and lower lung fields. Correlate for pulmonary edema and/or infection. No pleural effusion or pneumothorax.    --- End of Report ---            BETITO GONZALEZ MD; Attending Radiologist  This document has been electronically signed. Aug 18 2021  9:42AM    < end of copied text >    < from: Xray Chest 1 View- PORTABLE-Routine (Xray Chest 1 View- PORTABLE-Routine .) (08.09.21 @ 19:39) >  EXAM:  XR CHEST PORTABLE ROUTINE 1V                            PROCEDURE DATE:  08/09/2021          INTERPRETATION:  Portable chest radiograph    CLINICAL INFORMATION: Assess assess for aspiration/Pneumonia due to Covid 19.    TECHNIQUE:  PortableAP view of the chest was obtained.    COMPARISON: 8/7/2021 chest available for review.    FINDINGS:    The lungs show subtle low RIGHT lobe lung basilar focal airspace disease. Remaining of visualized lung parenchyma clear.. No pneumothorax.    The  heart is mildly enlarged in transverse diameter. No hilar mass.  Status post coronary artery bypass graft procedure.  Cardiac device wire leads are within right atrium and right ventricle.     Visualized osseous structures are intact.    IMPRESSION:  RIGHT lung base focal airspace disease. . Cardiomegaly.        --- End of Report ---            NATE LINK MD; Attending Radiologist  This document has been electronically signed. Aug 11 2021  5:23PM    < end of copied text >    < from: Xray Chest 1 View-PORTABLE IMMEDIATE (08.07.21 @ 17:28) >    EXAM:  XR CHEST PORTABLE IMMED 1V                            PROCEDURE DATE:  08/07/2021          INTERPRETATION:  Clinical Information: Sepsis    Technique: AP chest x-ray(s).    Comparison: 04/19/2021    Findings/  Impression: Status post CABG and left pacemaker. Lungs are clear.    --- End of Report ---            JENNIFER KRISHNA MD; Attending Interventional Radiologist  This document has been electronically signed. Aug  8 2021 10:09AM    < end of copied text >    < from: CT Head No Cont (08.07.21 @ 17:50) >  EXAM:  CT BRAIN                            PROCEDURE DATE:  08/07/2021          INTERPRETATION:  Exam Date: 8/7/2021 5:50 PM    CT head without IV contrast    CLINICAL INFORMATION: ams    TECHNIQUE: Contiguous axial sections were obtained through the head.   Coronal and sagittal reformats were obtained.    COMPARISON: CT head 4/19/2021    FINDINGS:  The ventricles and sulci are prominent, compatible with age-related generalized cerebral volume loss.   There is no CT evidence for acute cerebral cortical infarct. There is no evidence of hemorrhage. There is periventricular and subcortical white matter hypoattenuation,  most compatible with chronic microvascular ischemic changes.   No mass effect is found in the brain.  There is no midline shift or herniation pattern.      The visualized portions of the paranasal sinuses and mastoid air cells are clear.    IMPRESSION:    No evidence of acute intracranial abnormality.  No evidence of hemorrhage.    Chronic changes as above.          --- Endof Report ---      < end of copied text >    PROTEIN CALORIE MALNUTRITION PRESENT: [ ]mild [ ]moderate [x ]severe [ ]underweight [ ]morbid obesity  https://www.andeal.org/vault/2440/web/files/ONC/Table_Clinical%20Characteristics%20to%20Document%20Malnutrition-White%20JV%20et%20al%202012.pdf    Height (cm): 160 (08-07-21 @ 16:23), 160 (04-20-21 @ 17:00)  Weight (kg): 67.1 (08-07-21 @ 23:00), 65.4 (04-19-21 @ 13:30)  BMI (kg/m2): 26.2 (08-07-21 @ 23:00), 25.5 (08-07-21 @ 16:23), 25.5 (04-20-21 @ 17:00)    [ x]PPSV2 < or = to 30% [ ]significant weight loss  [x ]poor nutritional intake  [ ]anasarca      [ ]Artificial Nutrition      REFERRALS:   [ ]Chaplaincy  [x ]Hospice  [ ]Child Life  [ ]Social Work  [ ]Case management [ ]Holistic Therapy     Goals of Care Document:  HPI:  89F with PMHx of CAD (post-CABG), HTN, and Parkinson's Disease sent in from [facility] assisted living for increasing confusion. History is limited as patient has baseline dementia and may be acutely altered. Unable to reach Atria and daughter not able to offer further information. It seems patient recently here in April 2021 for acute hypoxic respiratory failure secondary to aspiration. She required intubation and then discharged. Today patient is here with fever, but otherwise normal vitals. Patient possibly with UTI. She was incidentally found to be COVID-19 positive, but was fully vaccinated in February 2021 with Pfizer. Patient given 2.3 L LR bolus and Ceftriaxone. (07 Aug 2021 20:20)    PERTINENT PM/SXH:   CAD (coronary artery disease)    HTN (hypertension)    HLD (hyperlipidemia)    Alzheimer&#x27;s dementia with behavioral disturbance, unspecified timing of dementia onset      Artificial pacemaker      FAMILY HISTORY:  No pertinent family history in first degree relatives      ITEMS NOT CHECKED ARE NOT PRESENT    SOCIAL HISTORY:   Significant other/partner[ ]  Children[ x]  Restorationism/Spirituality:  Druze  Substance hx:  [ ]   Tobacco hx:  [ ]   Alcohol hx: [ ]   Home Opioid hx:  [ ] I-Stop Reference No:  Living Situation: [ ]Home  [ ]Long term care  [ ]Rehab [x ]Other- assisted living     ADVANCE DIRECTIVES:    DNR  MOLST  [x ]  Living Will  [ ]   DECISION MAKER(s):  [ ] Health Care Proxy(s)  [ ] Surrogate(s)  [ ] Guardian           Name(s): Phone Number(s):    BASELINE (I)ADL(s) (prior to admission):  [place]: [ ]Total  [ ] Moderate [x ]Dependent    Allergies    sulfa drugs (Unknown)    Intolerances    MEDICATIONS  (STANDING):  aspirin  chewable 81 milliGRAM(s) Oral daily  cloNIDine Patch 0.1 mG/24Hr(s) 1 patch Transdermal every 7 days  dexAMETHasone  Injectable 6 milliGRAM(s) IV Push daily  metoprolol succinate ER 25 milliGRAM(s) Oral daily  morphine  - Injectable 0.5 milliGRAM(s) IV Push every 6 hours    MEDICATIONS  (PRN):  acetaminophen   Tablet .. 650 milliGRAM(s) Oral every 6 hours PRN Temp greater or equal to 38C (100.4F), Mild Pain (1 - 3), Moderate Pain (4 - 6), Severe Pain (7 - 10)  ALBUTerol    90 MICROgram(s) HFA Inhaler 2 Puff(s) Inhalation every 6 hours PRN Shortness of Breath and/or Wheezing  guaiFENesin Oral Liquid (Sugar-Free) 200 milliGRAM(s) Oral every 6 hours PRN Cough  morphine  - Injectable 1 milliGRAM(s) IV Push every 8 hours PRN dsypnea    PRESENT SYMPTOMS: [ ]Unable to obtain due to poor mentation   Source if other than patient:  [ ]Family   [ ]Team     Pain: [ ]yes [ ]no  QOL impact -   Location -                    Aggravating factors -  Quality -  Radiation -  Timing-  Severity (0-10 scale):  Minimal acceptable level (0-10 scale):     CPOT:    https://www.[facility].org/getattachment/[email or web address]/Critical-Care-Pain-Observation-Tool-(CPOT)      PAIN AD Score:     http://geriatrictoolkit.[facility]/cog/painad.pdf (press ctrl +  left click to view)    Dyspnea:                           [ ]Mild [ ]Moderate [ ]Severe  Anxiety:                             [ ]Mild [ ]Moderate [ ]Severe  Fatigue:                             [ ]Mild [ ]Moderate [ ]Severe  Nausea:                             [ ]Mild [ ]Moderate [ ]Severe  Loss of appetite:              [ ]Mild [ ]Moderate [ ]Severe  Constipation:                    [ ]Mild [ ]Moderate [ ]Severe    Other Symptoms:  [ ]All other review of systems negative     Palliative Performance Status Version 2:         10%    http://[facility].org/files/news/palliative_performance_scale_ppsv2.pdf  PHYSICAL EXAM:  Vital Signs Last 24 Hrs  T(C): 36.6 (25 Aug 2021 05:27), Max: 36.7 (24 Aug 2021 23:30)  T(F): 97.8 (25 Aug 2021 05:27), Max: 98 (24 Aug 2021 23:30)  HR: 95 (25 Aug 2021 05:27) (78 - 95)  BP: 135/72 (25 Aug 2021 05:27) (135/72 - 156/54)  BP(mean): --  RR: 18 (25 Aug 2021 05:27) (18 - 18)  SpO2: 98% (25 Aug 2021 05:27) (98% - 99%) I&O's Summary    24 Aug 2021 07:01  -  25 Aug 2021 07:00  --------------------------------------------------------  IN: 0 mL / OUT: 600 mL / NET: -600 mL      GENERAL:  [ ]Alert  [ ]Oriented x   [ x]Lethargic  [ ]Cachexia  [ ]Unarousable  [ ]Verbal  [x ]Non-Verbal  Behavioral:   [ ] Anxiety  [x ] Delirium [ ] Agitation [ ] Other  HEENT:  [ ]Normal   [ x]Dry mouth   [ ]ET Tube/Trach  [ ]Oral lesions  PULMONARY:   [ ]Clear [x ]Tachypnea  [ ]Audible excessive secretions   [ ]Rhonchi        [ ]Right [ ]Left [ ]Bilateral  [x ]Crackles        [ ]Right [ ]Left [ x]Bilateral  [ ]Wheezing     [ ]Right [ ]Left [ ]Bilateral  [ ]Diminished breath sounds [ ]right [ ]left [ ]bilateral  CARDIOVASCULAR:    [ ]Regular [ ]Irregular [ ]Tachy  [ ][place] [ ]Murmur [ x]Other- paced  GASTROINTESTINAL:  [ x]Soft  [ ]Distended   [ ]+BS  [x ]Non tender [ ]Tender  [ ]PEG [ ]OGT/ NGT  Last BM:   GENITOURINARY:  [ ]Normal [x ] Incontinent   [ ]Oliguria/Anuria   [ ]Maddox  MUSCULOSKELETAL:   [ ]Normal   [ x]Weakness  [ ]Bed/Wheelchair bound [ ]Edema  NEUROLOGIC:   [ ]No focal deficits  [ x]Cognitive impairment  [ ]Dysphagia [ ]Dysarthria [ ]Paresis [ ]Other   SKIN:   [x ]Normal    [ ]Rash  [ ]Pressure ulcer(s)       Present on admission [ ]y [ ]n    CRITICAL CARE:  [ ] Shock Present  [ ]Septic [ ]Cardiogenic [ ]Neurologic [ ]Hypovolemic  [ ]  Vasopressors [ ]  Inotropes   [ ]Respiratory failure present [ ]Mechanical ventilation [ ]Non-invasive ventilatory support [ ]High flow    [ ]Acute  [ ]Chronic [ ]Hypoxic  [ ]Hypercarbic [ ]Other  [ ]Other organ failure     LABS:            RADIOLOGY & ADDITIONAL STUDIES:  < from: Xray Chest 1 View- PORTABLE-Routine (Xray Chest 1 View- PORTABLE-Routine in AM.) (08.19.21 @ 07:47) >    EXAM:  XR CHEST PORTABLE ROUTINE 1V                            PROCEDURE DATE:  08/19/2021          INTERPRETATION:  Clinical history: Pneumonia    Portable chest    Extensive bilateral airspace disease. Overall no significant change since August 16.    IMPRESSION: No significant change    --- End of Report ---            JASON GALINDO MD; Attending Radiologist  This document has been electronically signed. Aug 20 2021  4:42PM    < end of copied text >  < from: Xray Chest 1 View- PORTABLE-Urgent (Xray Chest 1 View- PORTABLE-Urgent .) (08.16.21 @ 09:52) >  EXAM:  XR CHEST PORTABLE URGENT 1V                            PROCEDURE DATE:  08/16/2021          INTERPRETATION:  Fever hypoxia.    AP chest. Prior 8/9/2021.    IMPRESSION:  Left ICD reidentified. No change heart mediastinum. Low lung volumes. Extensive new bilateral airspace disease in the mid and lower lung fields. Correlate for pulmonary edema and/or infection. No pleural effusion or pneumothorax.    --- End of Report ---            BETITO GONZALEZ MD; Attending Radiologist  This document has been electronically signed. Aug 18 2021  9:42AM    < end of copied text >    < from: Xray Chest 1 View- PORTABLE-Routine (Xray Chest 1 View- PORTABLE-Routine .) (08.09.21 @ 19:39) >  EXAM:  XR CHEST PORTABLE ROUTINE 1V                            PROCEDURE DATE:  08/09/2021          INTERPRETATION:  Portable chest radiograph    CLINICAL INFORMATION: Assess assess for aspiration/Pneumonia due to Covid 19.    TECHNIQUE:  PortableAP view of the chest was obtained.    COMPARISON: 8/7/2021 chest available for review.    FINDINGS:    The lungs show subtle low RIGHT lobe lung basilar focal airspace disease. Remaining of visualized lung parenchyma clear.. No pneumothorax.    The  heart is mildly enlarged in transverse diameter. No hilar mass.  Status post coronary artery bypass graft procedure.  Cardiac device wire leads are within right atrium and right ventricle.     Visualized osseous structures are intact.    IMPRESSION:  RIGHT lung base focal airspace disease. . Cardiomegaly.        --- End of Report ---            NATE LINK MD; Attending Radiologist  This document has been electronically signed. Aug 11 2021  5:23PM    < end of copied text >    < from: Xray Chest 1 View-PORTABLE IMMEDIATE (08.07.21 @ 17:28) >    EXAM:  XR CHEST PORTABLE IMMED 1V                            PROCEDURE DATE:  08/07/2021          INTERPRETATION:  Clinical Information: Sepsis    Technique: AP chest x-ray(s).    Comparison: 04/19/2021    Findings/  Impression: Status post CABG and left pacemaker. Lungs are clear.    --- End of Report ---            JENNIFER KRISHNA MD; Attending Interventional Radiologist  This document has been electronically signed. Aug  8 2021 10:09AM    < end of copied text >    < from: CT Head No Cont (08.07.21 @ 17:50) >  EXAM:  CT BRAIN                            PROCEDURE DATE:  08/07/2021          INTERPRETATION:  Exam Date: 8/7/2021 5:50 PM    CT head without IV contrast    CLINICAL INFORMATION: ams    TECHNIQUE: Contiguous axial sections were obtained through the head.   Coronal and sagittal reformats were obtained.    COMPARISON: CT head 4/19/2021    FINDINGS:  The ventricles and sulci are prominent, compatible with age-related generalized cerebral volume loss.   There is no CT evidence for acute cerebral cortical infarct. There is no evidence of hemorrhage. There is periventricular and subcortical white matter hypoattenuation,  most compatible with chronic microvascular ischemic changes.   No mass effect is found in the brain.  There is no midline shift or herniation pattern.      The visualized portions of the paranasal sinuses and mastoid air cells are clear.    IMPRESSION:    No evidence of acute intracranial abnormality.  No evidence of hemorrhage.    Chronic changes as above.          --- Endof Report ---      < end of copied text >    PROTEIN CALORIE MALNUTRITION PRESENT: [ ]mild [ ]moderate [x ]severe [ ]underweight [ ]morbid obesity  https://www.andeal.org/vault/2440/web/files/ONC/Table_Clinical%20Characteristics%20to%20Document%20Malnutrition-White%20JV%20et%20al%202012.pdf    Height (cm): 160 (08-07-21 @ 16:23), 160 (04-20-21 @ 17:00)  Weight (kg): 67.1 (08-07-21 @ 23:00), 65.4 (04-19-21 @ 13:30)  BMI (kg/m2): 26.2 (08-07-21 @ 23:00), 25.5 (08-07-21 @ 16:23), 25.5 (04-20-21 @ 17:00)    [ x]PPSV2 < or = to 30% [ ]significant weight loss  [x ]poor nutritional intake  [ ]anasarca      [ ]Artificial Nutrition      REFERRALS:   [ ]Chaplaincy  [x ]Hospice  [ ]Child Life  [ ]Social Work  [ ]Case management [ ]Holistic Therapy     Goals of Care Document:

## 2021-08-25 NOTE — CONSULT NOTE ADULT - CONSULT REASON
Evaluate CV status
DONITA CKD
hypoxic Respiratory failure.
COVID  Pneumonia  UTI
symptom management, resp failure due to COVID

## 2021-08-25 NOTE — CONSULT NOTE ADULT - ASSESSMENT
89F with PMHx of CAD, HTN, and Parkinson's Disease admitted from [facility] for lethargy and fever found to COVID. Pt fully vaccinated as of 2/21. Pt known to Palliative Care service from prior admission in April with aspiration/choking event. Dr. Pickett has had GOC with pt's daughter and MOLST has been completed. Palliative Care consulted for symptom management, possible hospice.

## 2021-08-25 NOTE — CONSULT NOTE ADULT - PROBLEM SELECTOR RECOMMENDATION 3
Would limit antibiotics to 5 days  No role for repeat U/A or UC&S at this time.
NPO per speech  pt too lethargic to take oral pleasure feeds  daughter does not want NGT

## 2021-08-25 NOTE — PROGRESS NOTE ADULT - SUBJECTIVE AND OBJECTIVE BOX
INTERVAL HPI:  89F with CAD (post-CABG), HTN, S/P PPM and Parkinson's Disease and Dementia.   Sent in from [facility] assisted living for increasing confusion and fever.   Per H & P was  here in April 2021 for acute hypoxic respiratory failure secondary to aspiration. She required intubation and then discharged.   Got admitted with UTI was also  found to be COVID-19 positive(( no infiltrate on CXR), Fully vaccinated in February 2021 with Pfizer. Patient given 2.3 L LR bolus and Ceftriaxone for UTI and sepsis. Being followed by ID.  Hospital course complicated with increasing O2 requirement, so started on Remdeivir and Dexamethasone.  08/18/21: On NRB mask and lethargic.  Not able to provide history.    OVERNIGHT EVENTS:  Became hypoxic with 50% Venti mask, back on NRB mask    Vital Signs Last 24 Hrs  T(C): 36.7 (25 Aug 2021 10:51), Max: 36.7 (24 Aug 2021 23:30)  T(F): 98 (25 Aug 2021 10:51), Max: 98 (24 Aug 2021 23:30)  HR: 77 (25 Aug 2021 10:51) (77 - 95)  BP: 163/70 (25 Aug 2021 10:51) (135/72 - 163/70)  BP(mean): --  RR: 18 (25 Aug 2021 10:51) (18 - 18)  SpO2: 98% (25 Aug 2021 10:51) (98% - 99%)    PHYSICAL EXAM:  GEN:        Lethargic, comfortable.  HEENT:    NRB mask  RESP:       no distress  CVS:          Regular rate and rhythm.     MEDICATIONS  (STANDING):  aspirin  chewable 81 milliGRAM(s) Oral daily  cloNIDine Patch 0.1 mG/24Hr(s) 1 patch Transdermal every 7 days  metoprolol succinate ER 25 milliGRAM(s) Oral daily  morphine  - Injectable 0.5 milliGRAM(s) IV Push every 6 hours    MEDICATIONS  (PRN):  acetaminophen   Tablet .. 650 milliGRAM(s) Oral every 6 hours PRN Temp greater or equal to 38C (100.4F), Mild Pain (1 - 3), Moderate Pain (4 - 6), Severe Pain (7 - 10)  ALBUTerol    90 MICROgram(s) HFA Inhaler 2 Puff(s) Inhalation every 6 hours PRN Shortness of Breath and/or Wheezing  guaiFENesin Oral Liquid (Sugar-Free) 200 milliGRAM(s) Oral every 6 hours PRN Cough  morphine  - Injectable 1 milliGRAM(s) IV Push every 8 hours PRN dsypnea      ASSESSMENT AND PLAN:  ·	Acute hypoxic Respiratory failure.  ·	COVID Pneumonia.  ·	UTI with E Coli.  ·	Staph Epi( Methicillin resistant bacteremia).  ·	Anemia.  ·	Renal Insuffiencey.  ·	CAD with CABG.  ·	S/P PPM.  ·	Parkinsonism  ·	Hypoalbuminemia.    Became hypoxic with Venti mask, so back on NRB mask.  Completed Remdesivir,  Dexamethasone discontinued.  Seen by palliative care.

## 2021-08-25 NOTE — CONSULT NOTE ADULT - PROBLEM SELECTOR RECOMMENDATION 8
spoke with pt's daughter, Megan, she is requesting compassionate visitation at this time- request made. Explained her mother's current clinical picture and orders placed to support her comfort. We discussed hospice care as well.

## 2021-08-25 NOTE — PHARMACOTHERAPY INTERVENTION NOTE - INTERVENTION TYPE RECOOMEND
Therapy Recommended - Alternative treatment
Therapy Recommended - Alternative treatment
Duration of Therapy Recommended

## 2021-08-25 NOTE — CONSULT NOTE ADULT - PROBLEM SELECTOR RECOMMENDATION 9
Precautions per protocol, ideally airborne and contact in negative pressure room  Supplemental oxygen as required to maintain adequate saturation.  Avoid NIPPV, high flow O2, nebulizers, or other interventions which may increase the likelihood of aerosolization as much as feasible  High threshold for antibiotic use  Vigilance for secondary infection and other superimposed events  Monitor inflammatory markers and lab data  DVT prophylaxis per protocol.  I do not think patient is appropriate for [facility]AB at this time  She does not merit treatment with RDV or Dex at this time
remains on NRB unable to titrate down  morphine 0.5mg IV q6, 1mg PRN for breakthrough distress

## 2021-08-25 NOTE — CONSULT NOTE ADULT - PROBLEM SELECTOR RECOMMENDATION 2
Baseline status not clear  Acute worsening likely multifactorial, including intercurrent infection, disruption of normal routine/environment, etc.  Supportive care
lethargic not able to respond much or eat

## 2021-08-25 NOTE — PHARMACOTHERAPY INTERVENTION NOTE - COMMENTS
Recommended heparin to enoxaparin switch for DVT prophylaxis in the setting of COVID as patient with improved renal function and to ease up administration time for nursing.
Recommended IV fluid without sodium chloride since sodium is elevated.
Recommended adjusting frequency from q8h to q12h since SCr increased to 1.83 and CrCl is now 15.
Recommended dexamethasone discontinuation- duration of therapy complete.

## 2021-08-25 NOTE — PROGRESS NOTE ADULT - ASSESSMENT
89F       with PMHx of CAD, HTN, and Parkinson's Disease    presenting from [facility] for increasing confusion.  Found to have COVID infection.      # Acute hypoxic Respiratory failure sec to COVID 19 Pneumonia-         CXR extensive b/l airspace disease in mid and lower lung fields.  s/p Decadron, Remedsivir     seen by ID.   # Dysphagia - continued poor intake.         Goals of care, artificial nutrition was extensively discussed with daughter.   / palliative care   # Hypernatremia - due to poor po intake.  Conservative management.   # HTN - will change to Catapres     # HLD c/w atorvastatin    # DONITA - Cr 1.7-1.8    # DVT d/c Lovenox as pt comfort care.    seen by palliative care/ goal is comfort care         daughter Megan ([phone]) and discussed plan of care again today.         She confirms comfort care wishes.  No further lab draws.         Daughter acknowledges that pt would not want "to live this way".  DNR / DNI / declines NGT placement.

## 2021-08-25 NOTE — PROGRESS NOTE ADULT - SUBJECTIVE AND OBJECTIVE BOX
[facility]  REVIEW OF SYSTEMS:  GEN: no fever,    no chills  RESP: no SOB,   no cough  CVS: no chest pain,   no palpitations  GI: no abdominal pain,   no nausea,   no vomiting,   no constipation,   no diarrhea  : no dysuria,   no frequency  NEURO: no headache,   no dizziness  PSYCH: no depression,   not anxious  Derm : no rash    MEDICATIONS  (STANDING):  aspirin  chewable 81 milliGRAM(s) Oral daily  cloNIDine Patch 0.1 mG/24Hr(s) 1 patch Transdermal every 7 days  metoprolol succinate ER 25 milliGRAM(s) Oral daily  morphine  - Injectable 0.5 milliGRAM(s) IV Push every 6 hours    MEDICATIONS  (PRN):  acetaminophen   Tablet .. 650 milliGRAM(s) Oral every 6 hours PRN Temp greater or equal to 38C (100.4F), Mild Pain (1 - 3), Moderate Pain (4 - 6), Severe Pain (7 - 10)  ALBUTerol    90 MICROgram(s) HFA Inhaler 2 Puff(s) Inhalation every 6 hours PRN Shortness of Breath and/or Wheezing  guaiFENesin Oral Liquid (Sugar-Free) 200 milliGRAM(s) Oral every 6 hours PRN Cough  morphine  - Injectable 1 milliGRAM(s) IV Push every 8 hours PRN dsypnea      Vital Signs Last 24 Hrs  T(C): 36.7 (25 Aug 2021 10:51), Max: 36.7 (24 Aug 2021 23:30)  T(F): 98 (25 Aug 2021 10:51), Max: 98 (24 Aug 2021 23:30)  HR: 77 (25 Aug 2021 10:51) (77 - 95)  BP: 163/70 (25 Aug 2021 10:51) (135/72 - 163/70)  BP(mean): --  RR: 18 (25 Aug 2021 10:51) (18 - 18)  SpO2: 98% (25 Aug 2021 10:51) (98% - 99%)  CAPILLARY BLOOD GLUCOSE        I&O's Summary    24 Aug 2021 07:01  -  25 Aug 2021 07:00  --------------------------------------------------------  IN: 0 mL / OUT: 600 mL / NET: -600 mL        PHYSICAL EXAM:  HEAD:  Atraumatic, Normocephalic  NECK: Supple, No   JVD  CHEST/LUNG:   no     rales,     no,    rhonchi  HEART: Regular rate and rhythm;         murmur  ABDOMEN: Soft, Nontender, ;   EXTREMITIES:    no    edema  NEUROLOGY:  alert    LABS:                                  Consultant(s) Notes Reviewed:      Care Discussed with Consultants/Other Providers:

## 2021-08-25 NOTE — CONSULT NOTE ADULT - PROBLEM SELECTOR RECOMMENDATION 4
Would limit antibiotics to 5 days  Aspiration precautions, as able  Diet modification as per speech/language pathology  F/U official CXR report
creatine fluctuating  now comfort care no new labs desired/sought

## 2021-08-26 NOTE — PROGRESS NOTE ADULT - PROBLEM SELECTOR PLAN 8
pt's daughter, Megan is to come for compassionate visit this evening. Spoke with her extensively about her mother's care and condition, she is aware of poor prognosis and does not want her to suffer. Full comfort measures.

## 2021-08-26 NOTE — PROGRESS NOTE ADULT - PROBLEM SELECTOR PROBLEM 3
Pneumonia due to COVID-19 virus
Dementia
R/O Acute UTI
Dementia
Dysphagia

## 2021-08-26 NOTE — PROGRESS NOTE ADULT - ASSESSMENT
89F with PMHx of CAD, HTN, and Parkinson's Disease presenting from [facility] for increasing confusion.  Found to have COVID infection.    Spoke with daughter Megan ([phone]) and discussed plan of care in detail on 8/26, she says that she will be visiting later at 6pm today.

## 2021-08-26 NOTE — PROGRESS NOTE ADULT - PROBLEM SELECTOR PROBLEM 4
R/O Pneumonia, aspiration
R/O Acute UTI
Functional quadriplegia
R/O Acute UTI
R/O Acute UTI
DONITA (acute kidney injury)
Yes

## 2021-08-26 NOTE — PROGRESS NOTE ADULT - PROBLEM SELECTOR PLAN 7
continue  cloNIDine Patch 0.1 mG/24Hr(s) 1 patch Transdermal every 7 days  metoprolol succinate ER 25 milliGRAM(s) Oral daily
prior status with only mild impairments   now compounded by acute illness.

## 2021-08-26 NOTE — PROGRESS NOTE ADULT - ASSESSMENT
89F with PMHx of CAD, HTN, and Parkinson's Disease admitted from [facility] for lethargy and fever found to COVID. Pt fully vaccinated as of 2/21. Pt known to Palliative Care service from prior admission in April with aspiration/choking event. Dr. Pickett has had GOC with pt's daughter and MOLST has been completed. Palliative Care consulted for symptom management, possible hospice.

## 2021-08-26 NOTE — PROGRESS NOTE ADULT - SUBJECTIVE AND OBJECTIVE BOX
Patient is a 89y old woman with COVID refractory to treatement who was placed on comfort.  Patient does not open eyes to voice stimuli but grimaces to tactile stimuli.  SUBJECTIVE & OBJECTIVE: Pt seen and examined at bedside.   PHYSICAL EXAM:  Vital Signs Last 24 Hrs  T(C): 36.4 (26 Aug 2021 11:05), Max: 37 (26 Aug 2021 00:23)  T(F): 97.5 (26 Aug 2021 11:05), Max: 98.6 (26 Aug 2021 00:23)  HR: 70 (26 Aug 2021 11:05) (61 - 79)  BP: 135/89 (26 Aug 2021 11:05) (135/89 - 162/53)  BP(mean): --  RR: 18 (26 Aug 2021 11:05) (16 - 18)  SpO2: 92% (26 Aug 2021 11:05) (91% - 99%)   Daily     Daily Weight in k.3 (26 Aug 2021 05:45)I&O's Detail  MEDICATIONS  (STANDING):  aspirin  chewable 81 milliGRAM(s) Oral daily  cloNIDine Patch 0.1 mG/24Hr(s) 1 patch Transdermal every 7 days  metoprolol succinate ER 25 milliGRAM(s) Oral daily  morphine  - Injectable 0.5 milliGRAM(s) IV Push every 6 hours    MEDICATIONS  (PRN):  acetaminophen   Tablet .. 650 milliGRAM(s) Oral every 6 hours PRN Temp greater or equal to 38C (100.4F), Mild Pain (1 - 3), Moderate Pain (4 - 6), Severe Pain (7 - 10)  ALBUTerol    90 MICROgram(s) HFA Inhaler 2 Puff(s) Inhalation every 6 hours PRN Shortness of Breath and/or Wheezing  guaiFENesin Oral Liquid (Sugar-Free) 200 milliGRAM(s) Oral every 6 hours PRN Cough  morphine  - Injectable 1 milliGRAM(s) IV Push every 8 hours PRN dsypnea  Patient is lying in bed, deep breathing while on a NRBM    GENERAL: frail appearing  HEAD:  Atraumatic, Normocephalic  EYES: conjunctiva and sclera clear  ENMT: dry mucous membranes  NECK: Supple, No JVD  NERVOUS SYSTEM:  moves extremities to touch  CHEST/LUNG: bronchial sounds  HEART: Regular rate and rhythm  ABDOMEN: Soft, Nontender, Nondistended; Bowel sounds present  EXTREMITIES:  2+ Peripheral Pulses, No clubbing, cyanosis, or edema  LABS:  CAPILLARY BLOOD GLUCOSE    RADIOLOGY & ADDITIONAL TESTS:

## 2021-08-26 NOTE — PROGRESS NOTE ADULT - PROBLEM SELECTOR PROBLEM 2
Acute dyspnea
COVID-19
Septic encephalopathy
Dementia
COVID-19

## 2021-08-26 NOTE — PROGRESS NOTE ADULT - PROBLEM SELECTOR PLAN 6
s/p remdesivir, dexamethasone  no improvement   CXR with bilateral extensive infiltrates
Cr 1.7-1.8 on admission

## 2021-08-26 NOTE — PROGRESS NOTE ADULT - SUBJECTIVE AND OBJECTIVE BOX
INTERVAL HPI:  89F with CAD (post-CABG), HTN, S/P PPM and Parkinson's Disease and Dementia.   Sent in from [facility] assisted [facility] for increasing confusion and fever.   Per H & P was  here in April 2021 for acute hypoxic respiratory failure secondary to aspiration. She required intubation and then discharged.   Got admitted with UTI was also  found to be COVID-19 positive(( no infiltrate on CXR), Fully vaccinated in February 2021 with Pfizer. Patient given 2.3 L LR bolus and Ceftriaxone for UTI and sepsis. Being followed by ID.  Hospital course complicated with increasing O2 requirement, so started on Remdeivir and Dexamethasone.  08/18/21: On NRB mask and lethargic.  Not able to provide history.    OVERNIGHT EVENTS:  Remains lethargic and on 100% NRB mask    Vital Signs Last 24 Hrs  T(C): 36.8 (26 Aug 2021 17:02), Max: 37 (26 Aug 2021 00:23)  T(F): 98.3 (26 Aug 2021 17:02), Max: 98.6 (26 Aug 2021 00:23)  HR: 70 (26 Aug 2021 17:02) (61 - 78)  BP: 143/58 (26 Aug 2021 17:02) (135/78 - 162/53)  BP(mean): --  RR: 17 (26 Aug 2021 17:02) (16 - 18)  SpO2: 98% (26 Aug 2021 17:02) (92% - 99%)    PHYSICAL EXAM:  GEN:        Lethargic, comfortable.  HEENT:    NRB mask  RESP:       no distress  CVS:          Regular rate and rhythm.   ABD:         Soft, non-tender, non-distended;     MEDICATIONS  (STANDING):  aspirin  chewable 81 milliGRAM(s) Oral daily  cloNIDine Patch 0.1 mG/24Hr(s) 1 patch Transdermal every 7 days  metoprolol succinate ER 25 milliGRAM(s) Oral daily  morphine  - Injectable 1 milliGRAM(s) IV Push every 6 hours  scopolamine 1 mG/72 Hr(s) Patch 1 Patch Transdermal every 72 hours    MEDICATIONS  (PRN):  acetaminophen   Tablet .. 650 milliGRAM(s) Oral every 6 hours PRN Temp greater or equal to 38C (100.4F), Mild Pain (1 - 3), Moderate Pain (4 - 6), Severe Pain (7 - 10)  ALBUTerol    90 MICROgram(s) HFA Inhaler 2 Puff(s) Inhalation every 6 hours PRN Shortness of Breath and/or Wheezing  bisacodyl Suppository 10 milliGRAM(s) Rectal daily PRN Constipation  guaiFENesin Oral Liquid (Sugar-Free) 200 milliGRAM(s) Oral every 6 hours PRN Cough  LORazepam   Injectable 1 milliGRAM(s) IV Push every 2 hours PRN Agitation  morphine  - Injectable 2 milliGRAM(s) IV Push every 1 hour PRN pain/labored breathing  ASSESSMENT AND PLAN:  ·	Acute hypoxic Respiratory failure.  ·	COVID Pneumonia.  ·	UTI with E Coli.  ·	Staph Epi( Methicillin resistant bacteremia).  ·	Anemia.  ·	Renal Insuffiencey.  ·	CAD with CABG.  ·	S/P PPM.  ·	Parkinsonism  ·	Hypoalbuminemia.    SPO2 98% on NRB mask, but desaturated on 50% Venti mask yesterday.  Seen by palliative care, on MSO4 round the clock.

## 2021-08-26 NOTE — PROGRESS NOTE ADULT - PROBLEM/PLAN-1
DISPLAY PLAN FREE TEXT
30[birth year]2017

## 2021-08-26 NOTE — PROGRESS NOTE ADULT - PROBLEM SELECTOR PLAN 4
S/P Rx
Would limit antibiotics to 5 days  Aspiration precautions, as able  Diet modification as per speech/language pathology
S/P Rx
comfort care no further labs
Inability to transfer self, total dependence.  Assist with ADLs
S/P Rx
S/P Rx  Observe off antibiotics

## 2021-08-26 NOTE — PROGRESS NOTE ADULT - PROBLEM SELECTOR PLAN 2
Precautions per protocol, ideally airborne and contact in negative pressure room  Supplemental oxygen as required to maintain adequate saturation.  Avoid NIPPV, high flow O2, nebulizers, or other interventions which may increase the likelihood of aerosolization as   Vigilance for secondary infection and other superimposed events  Monitor inflammatory markers and lab data  DVT prophylaxis per protocol.  continue remdesivir x 5 days  continue decadron x 10 days
Precautions per protocol, ideally airborne and contact in negative pressure room  Supplemental oxygen as required to maintain adequate saturation.  Avoid NIPPV, high flow O2, nebulizers, or other interventions which may increase the likelihood of aerosolization as   Vigilance for secondary infection and other superimposed events  Monitor inflammatory markers and lab data  DVT prophylaxis per protocol.  continue remdesivir x 5 days  continue decadron x 10 days  pulmonology consult appreciated
Precautions per protocol, ideally airborne and contact in negative pressure room  Supplemental oxygen as required to maintain adequate saturation.  Avoid NIPPV, high flow O2, nebulizers, or other interventions which may increase the likelihood of aerosolization as much as feasible  High threshold for antibiotic use  Vigilance for secondary infection and other superimposed events  Monitor inflammatory markers and lab data  DVT prophylaxis per protocol.  I do not think patient is appropriate for MCAB at this time  She does not merit treatment with RDV or Dex at this time.
Precautions per protocol, ideally airborne and contact in negative pressure room  Supplemental oxygen as required to maintain adequate saturation.  Avoid NIPPV, high flow O2, nebulizers, or other interventions which may increase the likelihood of aerosolization as   Vigilance for secondary infection and other superimposed events  Monitor inflammatory markers and lab data  DVT prophylaxis per protocol.  start remdesivir x 5 days  start Decadron x 10 days
Precautions per protocol, ideally airborne and contact in negative pressure room  Supplemental oxygen as required to maintain adequate saturation.  Avoid NIPPV, high flow O2, nebulizers, or other interventions which may increase the likelihood of aerosolization as   Vigilance for secondary infection and other superimposed events  Monitor inflammatory markers and lab data  DVT prophylaxis per protocol.  continue remdesivir x 5 days  continue decadron x 10 days  pulmonology consult
lethargic, not speaking, moaning
Secondary to respiratory failure due to COVID-19 complication
Precautions per protocol, ideally airborne and contact in negative pressure room  Supplemental oxygen as required to maintain adequate saturation.  Avoid NIPPV, high flow O2, nebulizers, or other interventions which may increase the likelihood of aerosolization as   Vigilance for secondary infection and other superimposed events  Monitor inflammatory markers and lab data  DVT prophylaxis per protocol.  remdesivir x 5 days - complete  continue decadron x 10 days  pulmonology follow up  wean off supplemental O2 as able
Precautions per protocol, ideally airborne and contact in negative pressure room  Supplemental oxygen as required to maintain adequate saturation.  Avoid NIPPV, high flow O2, nebulizers, or other interventions which may increase the likelihood of aerosolization as   Vigilance for secondary infection and other superimposed events  Monitor inflammatory markers and lab data  DVT prophylaxis per protocol.  remdesivir x 5 days - complete  continue decadron x 10 days  pulmonology follow up  wean off supplemental O2 as able
aseline status not clear  Acute worsening likely multifactorial, including intercurrent infection, disruption of normal routine/environment, etc.  Supportive care.
Precautions per protocol, ideally airborne and contact in negative pressure room  Supplemental oxygen as required to maintain adequate saturation.  Avoid NIPPV, high flow O2, nebulizers, or other interventions which may increase the likelihood of aerosolization as   Vigilance for secondary infection and other superimposed events  Monitor inflammatory markers and lab data  DVT prophylaxis per protocol.  continue remdesivir x 5 days  continue decadron x 10 days  pulmonology follow up  wean off supplemental O2 as able

## 2021-08-26 NOTE — PROGRESS NOTE ADULT - PROBLEM SELECTOR PLAN 3
Baseline status not clear  Acute worsening likely multifactorial, including intercurrent infection, disruption of normal routine/environment, etc.  Supportive care.
Recommendation: Would limit antibiotics to 5 days  No role for repeat U/A or UC&S at this time.
Baseline status not clear  Acute worsening likely multifactorial, including intercurrent infection, disruption of normal routine/environment, etc.  Supportive care.
Completed remdesivir and decadron course.
Baseline status not clear  Acute worsening likely multifactorial, including intercurrent infection, disruption of normal routine/environment, etc.  Supportive care.
NPO per speech  pt too lethargic to take oral pleasure feeds  daughter does not want NGT

## 2021-08-26 NOTE — PROGRESS NOTE ADULT - PROBLEM SELECTOR PROBLEM 5
R/O Pneumonia, aspiration
Dysphagia
R/O Pneumonia, aspiration
Functional quadriplegia
R/O Pneumonia, aspiration

## 2021-08-26 NOTE — PROGRESS NOTE ADULT - SUBJECTIVE AND OBJECTIVE BOX
SUBJECTIVE AND OBJECTIVE: unchanged, labored breathing   INTERVAL HPI/OVERNIGHT EVENTS:    DNR on chart: yes  Allergies    sulfa drugs (Unknown)    Intolerances    MEDICATIONS  (STANDING):  aspirin  chewable 81 milliGRAM(s) Oral daily  cloNIDine Patch 0.1 mG/24Hr(s) 1 patch Transdermal every 7 days  metoprolol succinate ER 25 milliGRAM(s) Oral daily  morphine  - Injectable 0.5 milliGRAM(s) IV Push every 6 hours    MEDICATIONS  (PRN):  acetaminophen   Tablet .. 650 milliGRAM(s) Oral every 6 hours PRN Temp greater or equal to 38C (100.4F), Mild Pain (1 - 3), Moderate Pain (4 - 6), Severe Pain (7 - 10)  ALBUTerol    90 MICROgram(s) HFA Inhaler 2 Puff(s) Inhalation every 6 hours PRN Shortness of Breath and/or Wheezing  guaiFENesin Oral Liquid (Sugar-Free) 200 milliGRAM(s) Oral every 6 hours PRN Cough  morphine  - Injectable 1 milliGRAM(s) IV Push every 8 hours PRN dsypnea      ITEMS UNCHECKED ARE NOT PRESENT    PRESENT SYMPTOMS: [ x]Unable to obtain due to poor mentation   Source if other than patient:  [ ]Family   [ ]Team     Pain:  [ ]yes [ ]no  QOL impact -   Location -                    Aggravating factors -  Quality -  Radiation -  Timing-  Severity (0-10 scale):  Minimal acceptable level (0-10 scale):     Dyspnea:                           [ ]Mild [ ]Moderate [ ]Severe  Anxiety:                             [ ]Mild [ ]Moderate [ ]Severe  Fatigue:                             [ ]Mild [ ]Moderate [ ]Severe  Nausea:                             [ ]Mild [ ]Moderate [ ]Severe  Loss of appetite:              [ ]Mild [ ]Moderate [ ]Severe  Constipation:                    [ ]Mild [ ]Moderate [ ]Severe    CPOT:    https://www.sccm.org/getattachment/bnl39f67-9k5o-8q0x-4w3t-3273v3104u3q/Critical-Care-Pain-Observation-Tool-(CPOT)    PAIN AD Score:	5  [email or web address].[email or web address] (Ctrl + left click to view)    Other Symptoms:  [ ]All other review of systems negative     Palliative Performance Status Version 2:       10  %      http://npcrc.org/files/news/palliative_performance_scale_ppsv2.pdf  PHYSICAL EXAM:  Vital Signs Last 24 Hrs  T(C): 36.4 (26 Aug 2021 11:05), Max: 37 (26 Aug 2021 00:23)  T(F): 97.5 (26 Aug 2021 11:05), Max: 98.6 (26 Aug 2021 00:23)  HR: 70 (26 Aug 2021 11:05) (61 - 79)  BP: 135/89 (26 Aug 2021 11:05) (135/89 - 162/53)  BP(mean): --  RR: 18 (26 Aug 2021 11:05) (16 - 18)  SpO2: 92% (26 Aug 2021 11:05) (91% - 99%) I&O's Summary     GENERAL:  [ ]Alert  [ ]Oriented x   [x ]Lethargic  [ ]Cachexia  [ ]Unarousable  [ ]Verbal  [ x]Non-Verbal  Behavioral:   [ ]Anxiety  [x ]Delirium [ ]Agitation [ ]Other  HEENT:  [ ]Normal   [x ]Dry mouth   [ ]ET Tube/Trach  [ ]Oral lesions  PULMONARY:   [ ]Clear [x ]Tachypnea  [ x]xAudible excessive secretions   [ ]Rhonchi        [ ]Right [ ]Left [ ]Bilateral  [ x]Crackles        [ ]Right [ ]Left [ x]Bilateral  [ ]Wheezing     [ ]Right [ ]Left [ ]Bilateral  [ ]Diminished BS [ ] Right [ ]Left [ ]Bilateral  CARDIOVASCULAR:    [ x]Regular [ ]Irregular [ ]Tachy  [ ][place] [ ]Murmur [ ]Other  GASTROINTESTINAL:  [x ]Soft  [ ]Distended   [ ]+BS  [ x]Non tender [ ]Tender  [ ]PEG [ ]OGT/ NGT   Last BM:  8/21  GENITOURINARY:  [ ]Normal [x ]Incontinent   [ ]Oliguria/Anuria   [ ][place]  MUSCULOSKELETAL:   [ ]Normal   [ ]Weakness  [x ]Bed/Wheelchair bound [ ]Edema  NEUROLOGIC:   [ ]No focal deficits  [ x] Cognitive impairment  [ x] Dysphagia [x ]Dysarthria [ ] Paresis [ ]Other   SKIN:   [ x]Normal  [ ]Rash   [ ]Pressure ulcer(s) [ ]y [ ]n present on admission    CRITICAL CARE:  [ ]Shock Present  [ ]Septic [ ]Cardiogenic [ ]Neurologic [ ]Hypovolemic  [ ]Vasopressors [ ]Inotropes  [x ]Respiratory failure present [ ]Mechanical Ventilation [ ]Non-invasive ventilatory support [ ]High-Flow   [x ]Acute  [ ]Chronic [ x]Hypoxic  [ ]Hypercarbic [ ]Other  [ ]Other organ failure     LABS:            RADIOLOGY & ADDITIONAL STUDIES:    Protein Calorie Malnutrition Present: [ ]mild [ ]moderate [ x]severe [ ]underweight [ ]morbid obesity  https://www.andeal.org/vault/2440/web/files/ONC/Table_Clinical%20Characteristics%20to%20Document%20Malnutrition-White%20JV%20et%20al%202012.pdf    Height (cm): 160 (08-07-21 @ 16:23), 160 (04-20-21 @ 17:00)  Weight (kg): 67.1 (08-07-21 @ 23:00), 65.4 (04-19-21 @ 13:30)  BMI (kg/m2): 26.2 (08-07-21 @ 23:00), 25.5 (08-07-21 @ 16:23), 25.5 (04-20-21 @ 17:00)    [x ]PPSV2 < or = 30%  [ x]significant weight loss [ x]poor nutritional intake [ ]anasarca    [ ]Artificial Nutrition    REFERRALS:   [ ]Chaplaincy  [x ]Hospice  [ ]Child Life  [ ]Social Work  [ ]Case management [ ]Holistic Therapy     Goals of Care Document:

## 2021-08-27 PROBLEM — G30.9 ALZHEIMER'S DISEASE, UNSPECIFIED: Chronic | Status: ACTIVE | Noted: 2021-01-01

## 2021-08-27 NOTE — DISCHARGE NOTE PROVIDER - HOSPITAL COURSE
89F with PMHx of CAD, HTN, and Parkinson's Disease presenting from [facility] for increasing confusion.  Found to have COVID infection.    Spoke with daughter Megan ([phone]) and discussed plan of care in detail on 8/26.   Patient with deteriorating status. Hospice consulted, approved for inpatient hospice.

## 2021-08-27 NOTE — DISCHARGE NOTE PROVIDER - CARE PROVIDER_API CALL
Gualberto Thao)  Physician  NPs  [address] [place] [address]  Phone: [phone]  Fax: [phone]  Follow Up Time:

## 2021-08-27 NOTE — DISCHARGE NOTE PROVIDER - NSDCMRMEDTOKEN_GEN_ALL_CORE_FT
aspirin 81 mg oral tablet: 1 tab(s) orally once a day  atorvastatin 20 mg oral tablet: 1 tab(s) orally once a day (at bedtime)  Firvanq 50 mg/mL oral liquid: 2.5 milliliter(s) orally 3 times a week  Metoprolol Succinate ER 25 mg oral tablet, extended release: 1 tab(s) orally once a day  Norvasc 2.5 mg oral tablet: 1 tab(s) orally once a day

## 2021-08-27 NOTE — H&P ADULT - HISTORY OF PRESENT ILLNESS
89 year old female admitted with Parkinson's disease, hypoxia and fevers respiratory failure secondary to COViD pneumonia  history of coronary artery disease status post CABS,  HTN  now has dysphagia, encephalopathy, acute kidney injury and pneumonia  goals of  discussion done and patient is referred for hospice

## 2021-08-27 NOTE — H&P ADULT - ASSESSMENT
89 year old female with dementia and coronary artery disease and Parkinson;s disease and COVID pneumonia  encephalopathy and hypoxia admitted to  hospice service for symptom management of dyspnea and pain.

## 2021-08-27 NOTE — DISCHARGE NOTE PROVIDER - NSDCCPCAREPLAN_GEN_ALL_CORE_FT
PRINCIPAL DISCHARGE DIAGNOSIS  Diagnosis: Acute pyelonephritis  Assessment and Plan of Treatment: discharge to hospice      SECONDARY DISCHARGE DIAGNOSES  Diagnosis: Delirium  Assessment and Plan of Treatment:     Diagnosis: COVID-19  Assessment and Plan of Treatment:

## 2021-08-27 NOTE — PROGRESS NOTE ADULT - PROVIDER SPECIALTY LIST ADULT
Hospitalist
Nephrology
Nephrology
Pulmonology
Hospitalist
Internal Medicine
Nephrology
Pulmonology
Hospitalist
Hospitalist
Infectious Disease
Nephrology
Nephrology
Pulmonology
Hospitalist
Infectious Disease
Infectious Disease
Hospitalist
Infectious Disease
Hospitalist
Palliative Care

## 2021-08-27 NOTE — PROGRESS NOTE ADULT - SUBJECTIVE AND OBJECTIVE BOX
Subjective: lethargic, on NRBM. Not eating or drinking according to the primary nurse.       MEDICATIONS  (STANDING):  aspirin  chewable 81 milliGRAM(s) Oral daily  cloNIDine Patch 0.1 mG/24Hr(s) 1 patch Transdermal every 7 days  dextrose 5%. 1000 milliLiter(s) (50 mL/Hr) IV Continuous <Continuous>  metoprolol succinate ER 25 milliGRAM(s) Oral daily  morphine  - Injectable 1 milliGRAM(s) IV Push every 6 hours  scopolamine 1 mG/72 Hr(s) Patch 1 Patch Transdermal every 72 hours    MEDICATIONS  (PRN):  acetaminophen   Tablet .. 650 milliGRAM(s) Oral every 6 hours PRN Temp greater or equal to 38C (100.4F), Mild Pain (1 - 3), Moderate Pain (4 - 6), Severe Pain (7 - 10)  ALBUTerol    90 MICROgram(s) HFA Inhaler 2 Puff(s) Inhalation every 6 hours PRN Shortness of Breath and/or Wheezing  bisacodyl Suppository 10 milliGRAM(s) Rectal daily PRN Constipation  guaiFENesin Oral Liquid (Sugar-Free) 200 milliGRAM(s) Oral every 6 hours PRN Cough  LORazepam   Injectable 1 milliGRAM(s) IV Push every 2 hours PRN Agitation  morphine  - Injectable 2 milliGRAM(s) IV Push every 1 hour PRN pain/labored breathing          T(C): 37.2 (08-27-21 @ 05:55), Max: 37.2 (08-27-21 @ 05:55)  HR: 60 (08-27-21 @ 05:55) (60 - 85)  BP: 136/69 (08-27-21 @ 05:55) (135/78 - 143/58)  RR: 17 (08-27-21 @ 05:55) (17 - 18)  SpO2: 92% (08-27-21 @ 05:55) (92% - 98%)  Wt(kg): --        I&O's Detail    26 Aug 2021 07:01  -  27 Aug 2021 07:00  --------------------------------------------------------  IN:  Total IN: 0 mL    OUT:    Voided (mL): 100 mL  Total OUT: 100 mL    Total NET: -100 mL               PHYSICAL EXAM:    GENERAL: lethargic, dyspneic.   CHEST/LUNG: rhonchi  HEART: S1S2  ABDOMEN: Soft, Nontender, Nondistended; Bowel sounds present  EXTREMITIES: no edema          Assessment :  DONITA -- pre renal azotemia  COVID PNA, severe  Hypernatremia    Plan:  Resume D5W at 50 ml hr  Check bladder scan  BMP in 24h

## 2021-08-27 NOTE — HOSPICE CARE NOTE - CONVESATION DETAILS
Received signed consents from pt's dtr. In pt admission will be completed today. Dr. Thao is aware and will make an In person visit.       Tanya Joyner   [phone]
Pt approved for In pt hospice services. I spoke with the pt's dtr Megan. She requested I email her the hospice consents for review and to sign. Will cont to f/u as needed.       Tanya Joyner Rn  [phone]

## 2021-08-27 NOTE — PROGRESS NOTE ADULT - REASON FOR ADMISSION
Confusion

## 2021-08-27 NOTE — H&P ADULT - MENTAL STATUS
Subjective .     REASON FOR FOLLOWUP:  CLL.                             HISTORY OF PRESENT ILLNESS:  The patient is a 86 y.o. year old female  who is here for follow-up with the above-mentioned history.    No new problems.  Denies fever, chills, unexplained weight loss, night sweats.  States she has been more active, working in the yard and walking regularly.  No infection since last visit.  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, trisomy 12   • GERD (gastroesophageal reflux disease)    • H/O Frequent PACs and PVCs    • H/O Lymphocytosis    • H/O Transient amnesia    • HTN (hypertension)    • Hyperlipidemia    • Hypothyroid    • Lumbar stenosis    • Neuropathy     Legs   • Osteopenia    • Pain     H/O diffuse chest, epigastric and lower abdominal pain   • Seasonal allergies    • Ulcerative colitis (CMS/HCC)        HEMATOLOGIC/ONCOLOGIC HISTORY:  (History from previous dates can be found in the separate document.)    MEDICATIONS    Current Outpatient Medications:   •  Albuterol (PROVENTIL IN), Inhale As Needed., Disp: , Rfl:   •  Cholecalciferol (VITAMIN D-3 PO), Take 2,000 Units by mouth daily., Disp: , Rfl:   •  dextromethorphan-guaifenesin (ROBITUSSIN-DM)  MG/5ML syrup, Take 5 mL by mouth Every 4 (Four) Hours As Needed., Disp: , Rfl:   •  folic acid (FOLVITE) 1 MG tablet, Take 1 mg by mouth daily., Disp: , Rfl:   •  Loratadine 10 MG capsule, Take 10 mg by mouth daily., Disp: , Rfl:   •  metoprolol succinate XL (TOPROL-XL) 25 MG 24 hr tablet, TAKE ONE TABLET BY MOUTH ONE TIME DAILY , Disp: 30 tablet, Rfl: 0  •  omeprazole (PriLOSEC) 20 MG capsule, Take 20 mg by mouth daily., Disp: , Rfl:   •  rosuvastatin (CRESTOR) 20 MG tablet, Take  by mouth Daily., Disp: , Rfl:   •  sulfaSALAzine (AZULFIDINE) 500 MG tablet, Take 500 mg by mouth 4 (four) times a day.,  Disp: , Rfl:     ALLERGIES:     Allergies   Allergen Reactions   • Penicillins Anaphylaxis       SOCIAL HISTORY:       Social History     Socioeconomic History   • Marital status:      Spouse name: Not on file   • Number of children: Not on file   • Years of education: Not on file   • Highest education level: Not on file   Occupational History     Employer: RETIRED   Tobacco Use   • Smoking status: Former Smoker     Packs/day: 1.50     Years: 40.00     Pack years: 60.00     Types: Cigarettes     Quit date:      Years since quittin.8   • Smokeless tobacco: Former User   Substance and Sexual Activity   • Alcohol use: Yes     Alcohol/week: 7.0 standard drinks     Types: 7 Glasses of wine per week     Comment: 1 drink daily   • Drug use: Never   • Sexual activity: Defer   Social History Narrative    housewife         FAMILY HISTORY:  Cancer-related family history includes Colon cancer in her mother.    REVIEW OF SYSTEMS:  Review of Systems   Constitutional: Negative for activity change.   HENT: Negative for nosebleeds and trouble swallowing.    Respiratory: Negative for shortness of breath and wheezing.    Cardiovascular: Negative for chest pain and palpitations.   Gastrointestinal: Negative for constipation, diarrhea and nausea.   Genitourinary: Negative for dysuria and hematuria.   Musculoskeletal: Negative for arthralgias and myalgias.   Skin: Negative for rash and wound.   Neurological: Negative for seizures and syncope.   Hematological: Negative for adenopathy. Does not bruise/bleed easily.   Psychiatric/Behavioral: Negative for confusion.      Objective    Vitals:    10/19/20 0932   BP: 179/88   Pulse: 69   Resp: 18   Temp: 97.8 °F (36.6 °C)   SpO2: 94%   Weight: 70.9 kg (156 lb 3.2 oz)   PainSc: 0-No pain     Current Status 10/19/2020   ECOG score 1      PHYSICAL EXAM:        CONSTITUTIONAL:  Vital signs reviewed.  No distress, looks comfortable.  EYES:  Conjunctiva and lids unremarkable.   PERRLA  EARS,NOSE,MOUTH,THROAT:  Ears and nose appear unremarkable.  Lips, teeth, gums appear unremarkable.  RESPIRATORY:  Normal respiratory effort.  Lungs clear to auscultation bilaterally.  CARDIOVASCULAR:  Normal S1, S2.  No murmurs rubs or gallops.  No significant lower extremity edema.  GASTROINTESTINAL: Abdomen appears unremarkable.  Nontender.  No hepatomegaly.  No splenomegaly.  LYMPHATIC:  No cervical, supraclavicular, axillary lymphadenopathy.  SKIN:  Warm.  No rashes.  PSYCHIATRIC:  Normal judgment and insight.  Normal mood and affect.          RECENT LABS:        WBC   Date/Time Value Ref Range Status   10/19/2020 09:01 AM 15.31 (H) 3.40 - 10.80 10*3/mm3 Final     Hemoglobin   Date/Time Value Ref Range Status   10/19/2020 09:01 AM 13.4 12.0 - 15.9 g/dL Final     Platelets   Date/Time Value Ref Range Status   10/19/2020 09:01  140 - 450 10*3/mm3 Final       Assessment/Plan     ASSESSMENT:  There are no diagnoses linked to this encounter.    *CLL, stage 0, trisomy 12 (prognostic significance not clear).  Has not required any treatment.  · WBC 9 in 2012, 11 in 2013.   · 13--15 [id number].  · No lymphadenopathy or splenomegaly.  CLL appears stable.    *Leukocytosis, due to lymphocytosis.  WBC 15.3    *Lymphocytosis.  Due to CLL.  Stable.  Monitor.  ALC 9090    *Macrocytosis.  MCV 93.7    *Non-drenching, intermittent, Night sweats.    · This does not appear to be due to CLL since everything else looks stable/asymptomatic.   · Recently, including today, denied night sweats    *Elevated blood pressure.  Blood pressure is higher than usual today.  I recommended she keep a log at home and notify her PCP if blood pressure remains significantly elevated.    PLAN:  M.DMer CBC 6 months  (I have previously offered annual follow-up but she prefers 6 months)     dementia

## 2021-08-28 NOTE — PROGRESS NOTE ADULT - SUBJECTIVE AND OBJECTIVE BOX
Patient is a 89y old  Female who presents with a chief complaint of dyspnea  COVID +  renal failure respiratory failure (27 Aug 2021 19:06)  patient is doing poorly.    vitals  reviewed.    patient  has poor prognosis.   barely responding  . No distress    INTERVAL HPI/OVERNIGHT EVENTS:  PAST MEDICAL & SURGICAL HISTORY:  CAD (coronary artery disease)  s/p CABG, PPM    HTN (hypertension)    HLD (hyperlipidemia)    Alzheimer&#x27;s dementia with behavioral disturbance, unspecified timing of dementia onset    Artificial pacemaker        MEDICATIONS  (STANDING):  cloNIDine Patch 0.1 mG/24Hr(s) 1 patch Transdermal every 7 days  dextrose 5%. 1000 milliLiter(s) (30 mL/Hr) IV Continuous <Continuous>  metoprolol succinate ER 25 milliGRAM(s) Oral daily  morphine  - Injectable 1 milliGRAM(s) IV Push every 6 hours  scopolamine 1 mG/72 Hr(s) Patch 1 Patch Transdermal every 72 hours    MEDICATIONS  (PRN):  acetaminophen   Tablet .. 650 milliGRAM(s) Oral every 6 hours PRN Temp greater or equal to 38C (100.4F), Mild Pain (1 - 3)  ALBUTerol    90 MICROgram(s) HFA Inhaler 2 Puff(s) Inhalation every 6 hours PRN Shortness of Breath and/or Wheezing  bisacodyl Suppository 10 milliGRAM(s) Rectal daily PRN Constipation  guaiFENesin Oral Liquid (Sugar-Free) 200 milliGRAM(s) Oral every 6 hours PRN Cough  LORazepam   Injectable 1 milliGRAM(s) IV Push every 2 hours PRN Agitation  morphine  - Injectable 2 milliGRAM(s) IV Push every 1 hour PRN pain/dyspnea      Allergies    sulfa drugs (Unknown)    Intolerances        REVIEW OF SYSTEMS:  CONSTITUTIONAL: No fever, weight loss, or fatigue  EYES: No eye pain, visual disturbances, or discharge  ENMT:  No difficulty hearing, tinnitus, vertigo; No sinus or throat pain  NECK: No pain or stiffness  BREASTS: No pain, masses, or nipple discharge  RESPIRATORY: No cough, wheezing, chills or hemoptysis; No shortness of breath  CARDIOVASCULAR: No chest pain, palpitations, dizziness, or leg swelling  GASTROINTESTINAL: No abdominal or epigastric pain. No nausea, vomiting, or hematemesis; No diarrhea or constipation. No melena or hematochezia.  GENITOURINARY: No dysuria, frequency, hematuria, or incontinence  NEUROLOGICAL: No headaches, memory loss, loss of strength, numbness, or tremors  SKIN: No itching, burning, rashes, or lesions   LYMPH NODES: No enlarged glands  ENDOCRINE: No heat or cold intolerance; No hair loss  MUSCULOSKELETAL: No joint pain or swelling; No muscle, back, or extremity pain  PSYCHIATRIC: No depression, anxiety, mood swings, or difficulty sleeping  HEME/LYMPH: No easy bruising, or bleeding gums  ALLERY AND IMMUNOLOGIC: No hives or eczema    Vital Signs Last 24 Hrs  T(C): 36.8 (28 Aug 2021 05:42), Max: 39.2 (28 Aug 2021 00:20)  T(F): 98.2 (28 Aug 2021 05:42), Max: 102.6 (28 Aug 2021 00:20)  HR: 91 (28 Aug 2021 05:42) (83 - 100)  BP: 146/66 (28 Aug 2021 05:42) (118/68 - 146/66)  BP(mean): --  RR: 20 (27 Aug 2021 16:56) (20 - 20)  SpO2: 87% (28 Aug 2021 05:42) (84% - 90%)    PHYSICAL EXAM:  GENERAL: NAD, well-groomed, well-developed  HEAD:  Atraumatic, Normocephalic  EYES: EOMI, PERRLA, conjunctiva and sclera clear  ENMT: No tonsillar erythema, exudates, or enlargement; Moist mucous membranes, Good dentition, No lesions  NECK: Supple, No JVD, Normal thyroid  NERVOUS SYSTEM:  Stuporous  CHEST/LUNG: Clear to percussion bilaterally; No rales, rhonchi, wheezing, or rubs  HEART: Regular rate and rhythm; No murmurs, rubs, or gallops  ABDOMEN: Soft, Nontender, Nondistended; Bowel sounds present  EXTREMITIES:  2+ Peripheral Pulses, No clubbing, cyanosis, or edema  LYMPH: No lymphadenopathy noted  SKIN: No rashes or lesions    LABS:                CAPILLARY BLOOD GLUCOSE                    RADIOLOGY & ADDITIONAL TESTS:    Imaging Personally Reviewed:  [ ] YES  [ ] NO    Consultant(s) Notes Reviewed:  [ ] YES  [ ] NO    Care Discussed with Consultants/Other Providers [ ] YES  [ ] NO    Care discussed with family,         [  ]   yes  [  ]  No    imp:    stable[ ]    unstable[  ]     improving [   ]       unchanged  [x  ]                Plans:  Continue present plans  [ x ] [facility]. Morphine for ANY DISTRESS.               New consult [  ]   specialty  .......               order test[  ]    test name.                  Discharge Planning  [  ]

## 2021-08-29 NOTE — DISCHARGE NOTE FOR THE EXPIRED PATIENT - REASON FOR ADMISSION
dyspnea  COVID +  renal failure respiratory failure  89y old  Female who presents with a chief complaint of dyspnea  COVID +  renal failure respiratory failure

## 2021-08-29 NOTE — CHART NOTE - NSCHARTNOTEFT_GEN_A_CORE
Called by RN to assess ot who was apneic and pulseless  Patient unresponsive to verbal/noxious stimuli.  Pupils fixed and dilated. No spontaneous breathing. No palpable carotid/radial pulse. No heart sounds. No breath sounds.  Time of Death: 0020 8/29/21  Attending   Family notified. Comfort care provided Called by RN to assess  who was apneic and pulseless  Patient unresponsive to verbal/noxious stimuli.  Pupils fixed and dilated. No spontaneous breathing. No palpable carotid/radial pulse. No heart sounds. No breath sounds.  Time of Death: 0020 8/29/21  Attending   Family notified. Comfort care provided

## 2021-08-29 NOTE — DISCHARGE NOTE FOR THE EXPIRED PATIENT - HOSPITAL COURSE
89 year old female admitted with Parkinson's disease, hypoxia and fevers respiratory failure secondary to COViD pneumonia  history of coronary artery disease status post CABS,  HTN  now has dysphagia, encephalopathy, acute kidney injury and pneumonia, Prognosis was poor, pt was hospice care  Pt was pronounced dead at 0020

## 2024-01-04 NOTE — PATIENT PROFILE ADULT - NSASFALLNEEDSASSISTWITH_GEN_A_NUR
What Type Of Note Output Would You Prefer (Optional)?: Standard Output Is The Patient Presenting As Previously Scheduled?: Yes How Severe Is Your Rash?: moderate Is This A New Presentation, Or A Follow-Up?: Rash toileting

## 2024-05-05 NOTE — PHYSICAL THERAPY INITIAL EVALUATION ADULT - BALANCE DISTURBANCE, SYSTEM IMPAIRMENT CONTRIBUTE, REHAB EVAL
+R ankle - 2+ DP, intact sensation, full rom in toes, tenderness along lateral malleolus and lateral foot
cognitive/neuromuscular/musculoskeletal